# Patient Record
Sex: MALE | Race: WHITE | NOT HISPANIC OR LATINO | Employment: OTHER | URBAN - METROPOLITAN AREA
[De-identification: names, ages, dates, MRNs, and addresses within clinical notes are randomized per-mention and may not be internally consistent; named-entity substitution may affect disease eponyms.]

---

## 2017-10-24 ENCOUNTER — LAB REQUISITION (OUTPATIENT)
Dept: LAB | Facility: HOSPITAL | Age: 62
End: 2017-10-24

## 2017-10-24 DIAGNOSIS — B18.2 CHRONIC VIRAL HEPATITIS C (HCC): ICD-10-CM

## 2017-10-24 DIAGNOSIS — Z00.00 ROUTINE GENERAL MEDICAL EXAMINATION AT A HEALTH CARE FACILITY: ICD-10-CM

## 2017-10-24 LAB
ALBUMIN SERPL-MCNC: 4.8 G/DL (ref 3.2–4.8)
ALBUMIN/GLOB SERPL: 1.5 G/DL (ref 1.5–2.5)
ALP SERPL-CCNC: 89 U/L (ref 25–100)
ALT SERPL W P-5'-P-CCNC: 26 U/L (ref 7–40)
ANION GAP SERPL CALCULATED.3IONS-SCNC: 7 MMOL/L (ref 3–11)
AST SERPL-CCNC: 25 U/L (ref 0–33)
BASOPHILS # BLD AUTO: 0.03 10*3/MM3 (ref 0–0.2)
BASOPHILS NFR BLD AUTO: 0.4 % (ref 0–1)
BILIRUB SERPL-MCNC: 0.3 MG/DL (ref 0.3–1.2)
BUN BLD-MCNC: 31 MG/DL (ref 9–23)
BUN/CREAT SERPL: 11.9 (ref 7–25)
CALCIUM SPEC-SCNC: 9.9 MG/DL (ref 8.7–10.4)
CHLORIDE SERPL-SCNC: 111 MMOL/L (ref 99–109)
CO2 SERPL-SCNC: 21 MMOL/L (ref 20–31)
CREAT BLD-MCNC: 2.6 MG/DL (ref 0.6–1.3)
DEPRECATED RDW RBC AUTO: 44 FL (ref 37–54)
EOSINOPHIL # BLD AUTO: 0.11 10*3/MM3 (ref 0–0.3)
EOSINOPHIL NFR BLD AUTO: 1.5 % (ref 0–3)
ERYTHROCYTE [DISTWIDTH] IN BLOOD BY AUTOMATED COUNT: 13.5 % (ref 11.3–14.5)
GFR SERPL CREATININE-BSD FRML MDRD: 25 ML/MIN/1.73
GLOBULIN UR ELPH-MCNC: 3.2 GM/DL
GLUCOSE BLD-MCNC: 105 MG/DL (ref 70–100)
HCT VFR BLD AUTO: 48.3 % (ref 38.9–50.9)
HGB BLD-MCNC: 16.3 G/DL (ref 13.1–17.5)
IMM GRANULOCYTES # BLD: 0.01 10*3/MM3 (ref 0–0.03)
IMM GRANULOCYTES NFR BLD: 0.1 % (ref 0–0.6)
LYMPHOCYTES # BLD AUTO: 1.49 10*3/MM3 (ref 0.6–4.8)
LYMPHOCYTES NFR BLD AUTO: 20.6 % (ref 24–44)
MCH RBC QN AUTO: 30.6 PG (ref 27–31)
MCHC RBC AUTO-ENTMCNC: 33.7 G/DL (ref 32–36)
MCV RBC AUTO: 90.6 FL (ref 80–99)
MONOCYTES # BLD AUTO: 0.62 10*3/MM3 (ref 0–1)
MONOCYTES NFR BLD AUTO: 8.6 % (ref 0–12)
NEUTROPHILS # BLD AUTO: 4.97 10*3/MM3 (ref 1.5–8.3)
NEUTROPHILS NFR BLD AUTO: 68.8 % (ref 41–71)
PLATELET # BLD AUTO: 106 10*3/MM3 (ref 150–450)
PMV BLD AUTO: 10.5 FL (ref 6–12)
POTASSIUM BLD-SCNC: 5.4 MMOL/L (ref 3.5–5.5)
PROT SERPL-MCNC: 8 G/DL (ref 5.7–8.2)
RBC # BLD AUTO: 5.33 10*6/MM3 (ref 4.2–5.76)
SODIUM BLD-SCNC: 139 MMOL/L (ref 132–146)
WBC NRBC COR # BLD: 7.23 10*3/MM3 (ref 3.5–10.8)

## 2017-10-24 PROCEDURE — 36415 COLL VENOUS BLD VENIPUNCTURE: CPT | Performed by: INTERNAL MEDICINE

## 2017-10-24 PROCEDURE — 85025 COMPLETE CBC W/AUTO DIFF WBC: CPT | Performed by: INTERNAL MEDICINE

## 2017-10-24 PROCEDURE — 80053 COMPREHEN METABOLIC PANEL: CPT | Performed by: INTERNAL MEDICINE

## 2018-04-24 ENCOUNTER — LAB REQUISITION (OUTPATIENT)
Dept: LAB | Facility: HOSPITAL | Age: 63
End: 2018-04-24

## 2018-04-24 DIAGNOSIS — Z00.00 ROUTINE GENERAL MEDICAL EXAMINATION AT A HEALTH CARE FACILITY: ICD-10-CM

## 2018-04-24 LAB
ALBUMIN SERPL-MCNC: 4.6 G/DL (ref 3.2–4.8)
ANION GAP SERPL CALCULATED.3IONS-SCNC: 5 MMOL/L (ref 3–11)
BACTERIA UR QL AUTO: ABNORMAL /HPF
BILIRUB UR QL STRIP: NEGATIVE
BUN BLD-MCNC: 30 MG/DL (ref 9–23)
BUN/CREAT SERPL: 11.5 (ref 7–25)
CALCIUM SPEC-SCNC: 9.5 MG/DL (ref 8.7–10.4)
CHLORIDE SERPL-SCNC: 107 MMOL/L (ref 99–109)
CLARITY UR: CLEAR
CO2 SERPL-SCNC: 26 MMOL/L (ref 20–31)
COLOR UR: YELLOW
CREAT BLD-MCNC: 2.6 MG/DL (ref 0.6–1.3)
GFR SERPL CREATININE-BSD FRML MDRD: 25 ML/MIN/1.73
GLUCOSE BLD-MCNC: 105 MG/DL (ref 70–100)
GLUCOSE UR STRIP-MCNC: NEGATIVE MG/DL
HGB UR QL STRIP.AUTO: ABNORMAL
HYALINE CASTS UR QL AUTO: ABNORMAL /LPF
KETONES UR QL STRIP: NEGATIVE
LEUKOCYTE ESTERASE UR QL STRIP.AUTO: NEGATIVE
NITRITE UR QL STRIP: NEGATIVE
PH UR STRIP.AUTO: <=5 [PH] (ref 5–8)
PHOSPHATE SERPL-MCNC: 2.7 MG/DL (ref 2.4–5.1)
POTASSIUM BLD-SCNC: 5.1 MMOL/L (ref 3.5–5.5)
PROT UR QL STRIP: ABNORMAL
RBC # UR: ABNORMAL /HPF
REF LAB TEST METHOD: ABNORMAL
SODIUM BLD-SCNC: 138 MMOL/L (ref 132–146)
SP GR UR STRIP: 1.02 (ref 1–1.03)
SQUAMOUS #/AREA URNS HPF: ABNORMAL /HPF
UROBILINOGEN UR QL STRIP: ABNORMAL
WBC UR QL AUTO: ABNORMAL /HPF

## 2018-04-24 PROCEDURE — 80069 RENAL FUNCTION PANEL: CPT | Performed by: INTERNAL MEDICINE

## 2018-04-24 PROCEDURE — 81001 URINALYSIS AUTO W/SCOPE: CPT | Performed by: INTERNAL MEDICINE

## 2018-04-24 PROCEDURE — 36415 COLL VENOUS BLD VENIPUNCTURE: CPT | Performed by: INTERNAL MEDICINE

## 2018-10-23 ENCOUNTER — LAB REQUISITION (OUTPATIENT)
Dept: LAB | Facility: HOSPITAL | Age: 63
End: 2018-10-23

## 2018-10-23 DIAGNOSIS — Z00.00 ROUTINE GENERAL MEDICAL EXAMINATION AT A HEALTH CARE FACILITY: ICD-10-CM

## 2018-10-23 DIAGNOSIS — B18.2 CHRONIC VIRAL HEPATITIS C (HCC): ICD-10-CM

## 2018-10-23 LAB
ALBUMIN SERPL-MCNC: 4.99 G/DL (ref 3.2–4.8)
ALBUMIN/GLOB SERPL: 2 G/DL (ref 1.5–2.5)
ALP SERPL-CCNC: 73 U/L (ref 25–100)
ALT SERPL W P-5'-P-CCNC: 27 U/L (ref 7–40)
ANION GAP SERPL CALCULATED.3IONS-SCNC: 7 MMOL/L (ref 3–11)
AST SERPL-CCNC: 24 U/L (ref 0–33)
BASOPHILS # BLD AUTO: 0.03 10*3/MM3 (ref 0–0.2)
BASOPHILS NFR BLD AUTO: 0.4 % (ref 0–1)
BILIRUB SERPL-MCNC: 0.5 MG/DL (ref 0.3–1.2)
BUN BLD-MCNC: 25 MG/DL (ref 9–23)
BUN/CREAT SERPL: 9.9 (ref 7–25)
CALCIUM SPEC-SCNC: 10.1 MG/DL (ref 8.7–10.4)
CHLORIDE SERPL-SCNC: 108 MMOL/L (ref 99–109)
CO2 SERPL-SCNC: 25 MMOL/L (ref 20–31)
CREAT BLD-MCNC: 2.53 MG/DL (ref 0.6–1.3)
DEPRECATED RDW RBC AUTO: 44.2 FL (ref 37–54)
EOSINOPHIL # BLD AUTO: 0.05 10*3/MM3 (ref 0–0.3)
EOSINOPHIL NFR BLD AUTO: 0.7 % (ref 0–3)
ERYTHROCYTE [DISTWIDTH] IN BLOOD BY AUTOMATED COUNT: 13.3 % (ref 11.3–14.5)
GFR SERPL CREATININE-BSD FRML MDRD: 26 ML/MIN/1.73
GLOBULIN UR ELPH-MCNC: 2.5 GM/DL
GLUCOSE BLD-MCNC: 103 MG/DL (ref 70–100)
HCT VFR BLD AUTO: 48.5 % (ref 38.9–50.9)
HGB BLD-MCNC: 16.2 G/DL (ref 13.1–17.5)
IMM GRANULOCYTES # BLD: 0.02 10*3/MM3 (ref 0–0.03)
IMM GRANULOCYTES NFR BLD: 0.3 % (ref 0–0.6)
INR PPP: 1.09 (ref 0.91–1.09)
LYMPHOCYTES # BLD AUTO: 1 10*3/MM3 (ref 0.6–4.8)
LYMPHOCYTES NFR BLD AUTO: 13.9 % (ref 24–44)
MCH RBC QN AUTO: 30.7 PG (ref 27–31)
MCHC RBC AUTO-ENTMCNC: 33.4 G/DL (ref 32–36)
MCV RBC AUTO: 92 FL (ref 80–99)
MONOCYTES # BLD AUTO: 0.4 10*3/MM3 (ref 0–1)
MONOCYTES NFR BLD AUTO: 5.6 % (ref 0–12)
NEUTROPHILS # BLD AUTO: 5.69 10*3/MM3 (ref 1.5–8.3)
NEUTROPHILS NFR BLD AUTO: 79.4 % (ref 41–71)
NRBC BLD MANUAL-RTO: 0 /100 WBC (ref 0–0)
PLATELET # BLD AUTO: 108 10*3/MM3 (ref 150–450)
PMV BLD AUTO: 10.4 FL (ref 6–12)
POTASSIUM BLD-SCNC: 5.3 MMOL/L (ref 3.5–5.5)
PROT SERPL-MCNC: 7.5 G/DL (ref 5.7–8.2)
PROTHROMBIN TIME: 11.4 SECONDS (ref 9.6–11.5)
RBC # BLD AUTO: 5.27 10*6/MM3 (ref 4.2–5.76)
SODIUM BLD-SCNC: 140 MMOL/L (ref 132–146)
WBC NRBC COR # BLD: 7.17 10*3/MM3 (ref 3.5–10.8)

## 2018-10-23 PROCEDURE — 80053 COMPREHEN METABOLIC PANEL: CPT | Performed by: INTERNAL MEDICINE

## 2018-10-23 PROCEDURE — 85610 PROTHROMBIN TIME: CPT | Performed by: INTERNAL MEDICINE

## 2018-10-23 PROCEDURE — 36415 COLL VENOUS BLD VENIPUNCTURE: CPT | Performed by: INTERNAL MEDICINE

## 2018-10-23 PROCEDURE — 85025 COMPLETE CBC W/AUTO DIFF WBC: CPT | Performed by: INTERNAL MEDICINE

## 2019-10-22 ENCOUNTER — LAB REQUISITION (OUTPATIENT)
Dept: LAB | Facility: HOSPITAL | Age: 64
End: 2019-10-22

## 2019-10-22 DIAGNOSIS — Z00.00 ROUTINE GENERAL MEDICAL EXAMINATION AT A HEALTH CARE FACILITY: ICD-10-CM

## 2019-10-22 LAB
ALBUMIN SERPL-MCNC: 4.9 G/DL (ref 3.5–5.2)
ALBUMIN/GLOB SERPL: 1.6 G/DL
ALP SERPL-CCNC: 78 U/L (ref 39–117)
ALT SERPL W P-5'-P-CCNC: 16 U/L (ref 1–41)
ANION GAP SERPL CALCULATED.3IONS-SCNC: 9 MMOL/L (ref 5–15)
AST SERPL-CCNC: 17 U/L (ref 1–40)
BASOPHILS # BLD AUTO: 0.05 10*3/MM3 (ref 0–0.2)
BASOPHILS NFR BLD AUTO: 0.9 % (ref 0–1.5)
BILIRUB SERPL-MCNC: 0.6 MG/DL (ref 0.2–1.2)
BUN BLD-MCNC: 35 MG/DL (ref 8–23)
BUN/CREAT SERPL: 13.9 (ref 7–25)
CALCIUM SPEC-SCNC: 9.9 MG/DL (ref 8.6–10.5)
CHLORIDE SERPL-SCNC: 105 MMOL/L (ref 98–107)
CO2 SERPL-SCNC: 26 MMOL/L (ref 22–29)
CREAT BLD-MCNC: 2.51 MG/DL (ref 0.76–1.27)
DEPRECATED RDW RBC AUTO: 44.8 FL (ref 37–54)
EOSINOPHIL # BLD AUTO: 0.12 10*3/MM3 (ref 0–0.4)
EOSINOPHIL NFR BLD AUTO: 2.2 % (ref 0.3–6.2)
ERYTHROCYTE [DISTWIDTH] IN BLOOD BY AUTOMATED COUNT: 12.9 % (ref 12.3–15.4)
GFR SERPL CREATININE-BSD FRML MDRD: 26 ML/MIN/1.73
GLOBULIN UR ELPH-MCNC: 3.1 GM/DL
GLUCOSE BLD-MCNC: 102 MG/DL (ref 65–99)
HCT VFR BLD AUTO: 46.5 % (ref 37.5–51)
HGB BLD-MCNC: 14.9 G/DL (ref 13–17.7)
IMM GRANULOCYTES # BLD AUTO: 0.02 10*3/MM3 (ref 0–0.05)
IMM GRANULOCYTES NFR BLD AUTO: 0.4 % (ref 0–0.5)
LYMPHOCYTES # BLD AUTO: 1.22 10*3/MM3 (ref 0.7–3.1)
LYMPHOCYTES NFR BLD AUTO: 22.1 % (ref 19.6–45.3)
MCH RBC QN AUTO: 30.3 PG (ref 26.6–33)
MCHC RBC AUTO-ENTMCNC: 32 G/DL (ref 31.5–35.7)
MCV RBC AUTO: 94.5 FL (ref 79–97)
MONOCYTES # BLD AUTO: 0.54 10*3/MM3 (ref 0.1–0.9)
MONOCYTES NFR BLD AUTO: 9.8 % (ref 5–12)
NEUTROPHILS # BLD AUTO: 3.57 10*3/MM3 (ref 1.7–7)
NEUTROPHILS NFR BLD AUTO: 64.6 % (ref 42.7–76)
NRBC BLD AUTO-RTO: 0 /100 WBC (ref 0–0.2)
PLATELET # BLD AUTO: 108 10*3/MM3 (ref 140–450)
PMV BLD AUTO: 10.3 FL (ref 6–12)
POTASSIUM BLD-SCNC: 5.3 MMOL/L (ref 3.5–5.2)
PROT SERPL-MCNC: 8 G/DL (ref 6–8.5)
RBC # BLD AUTO: 4.92 10*6/MM3 (ref 4.14–5.8)
SODIUM BLD-SCNC: 140 MMOL/L (ref 136–145)
WBC NRBC COR # BLD: 5.52 10*3/MM3 (ref 3.4–10.8)

## 2019-10-22 PROCEDURE — 80053 COMPREHEN METABOLIC PANEL: CPT | Performed by: INTERNAL MEDICINE

## 2019-10-22 PROCEDURE — 85025 COMPLETE CBC W/AUTO DIFF WBC: CPT | Performed by: INTERNAL MEDICINE

## 2019-10-22 PROCEDURE — 36415 COLL VENOUS BLD VENIPUNCTURE: CPT | Performed by: INTERNAL MEDICINE

## 2022-11-11 ENCOUNTER — TRANSCRIBE ORDERS (OUTPATIENT)
Dept: ADMINISTRATIVE | Facility: HOSPITAL | Age: 67
End: 2022-11-11

## 2022-11-11 DIAGNOSIS — K74.02 HEPATIC FIBROSIS, ADVANCED FIBROSIS: Primary | ICD-10-CM

## 2022-11-14 ENCOUNTER — HOSPITAL ENCOUNTER (OUTPATIENT)
Dept: ULTRASOUND IMAGING | Facility: HOSPITAL | Age: 67
Discharge: HOME | End: 2022-11-14
Admitting: INTERNAL MEDICINE

## 2022-11-14 DIAGNOSIS — K74.02 HEPATIC FIBROSIS, ADVANCED FIBROSIS: ICD-10-CM

## 2022-11-14 PROCEDURE — 76700 US EXAM ABDOM COMPLETE: CPT

## 2023-08-15 ENCOUNTER — TRANSCRIBE ORDERS (OUTPATIENT)
Dept: ADMINISTRATIVE | Facility: HOSPITAL | Age: 68
End: 2023-08-15
Payer: MEDICARE

## 2023-08-15 DIAGNOSIS — K74.02 STAGE 3 HEPATIC FIBROSIS: Primary | ICD-10-CM

## 2023-09-06 ENCOUNTER — HOSPITAL ENCOUNTER (OUTPATIENT)
Dept: ULTRASOUND IMAGING | Facility: HOSPITAL | Age: 68
Discharge: HOME | End: 2023-09-06
Admitting: INTERNAL MEDICINE
Payer: MEDICARE

## 2023-09-06 DIAGNOSIS — K74.02 STAGE 3 HEPATIC FIBROSIS: ICD-10-CM

## 2023-09-06 PROCEDURE — 76705 ECHO EXAM OF ABDOMEN: CPT

## 2024-01-18 ENCOUNTER — HOSPITAL ENCOUNTER (OUTPATIENT)
Dept: GENERAL RADIOLOGY | Facility: HOSPITAL | Age: 69
Discharge: HOME | End: 2024-01-18
Payer: MEDICARE

## 2024-01-18 ENCOUNTER — PRE-ADMISSION TESTING (OUTPATIENT)
Dept: PREADMISSION TESTING | Facility: HOSPITAL | Age: 69
End: 2024-01-18
Payer: MEDICARE

## 2024-01-18 VITALS — WEIGHT: 201.83 LBS | BODY MASS INDEX: 28.26 KG/M2 | HEIGHT: 71 IN

## 2024-01-18 LAB
ALBUMIN SERPL-MCNC: 4.5 G/DL (ref 3.5–5.2)
ALBUMIN/GLOB SERPL: 1.5 G/DL
ALP SERPL-CCNC: 108 U/L (ref 39–117)
ALT SERPL W P-5'-P-CCNC: 16 U/L (ref 1–41)
ANION GAP SERPL CALCULATED.3IONS-SCNC: 8 MMOL/L (ref 5–15)
APTT PPP: 33.3 SECONDS (ref 22–39)
AST SERPL-CCNC: 15 U/L (ref 1–40)
BASOPHILS # BLD AUTO: 0.03 10*3/MM3 (ref 0–0.2)
BASOPHILS NFR BLD AUTO: 0.4 % (ref 0–1.5)
BILIRUB SERPL-MCNC: 0.3 MG/DL (ref 0–1.2)
BUN SERPL-MCNC: 44 MG/DL (ref 8–23)
BUN/CREAT SERPL: 15.1 (ref 7–25)
CALCIUM SPEC-SCNC: 9.6 MG/DL (ref 8.6–10.5)
CHLORIDE SERPL-SCNC: 107 MMOL/L (ref 98–107)
CO2 SERPL-SCNC: 25 MMOL/L (ref 22–29)
CREAT SERPL-MCNC: 2.92 MG/DL (ref 0.76–1.27)
DEPRECATED RDW RBC AUTO: 44.8 FL (ref 37–54)
EGFRCR SERPLBLD CKD-EPI 2021: 22.9 ML/MIN/1.73
EOSINOPHIL # BLD AUTO: 0.15 10*3/MM3 (ref 0–0.4)
EOSINOPHIL NFR BLD AUTO: 2.2 % (ref 0.3–6.2)
ERYTHROCYTE [DISTWIDTH] IN BLOOD BY AUTOMATED COUNT: 13.2 % (ref 12.3–15.4)
GLOBULIN UR ELPH-MCNC: 3 GM/DL
GLUCOSE SERPL-MCNC: 145 MG/DL (ref 65–99)
HBA1C MFR BLD: 5.1 % (ref 4.8–5.6)
HCT VFR BLD AUTO: 39.6 % (ref 37.5–51)
HGB BLD-MCNC: 13.1 G/DL (ref 13–17.7)
IMM GRANULOCYTES # BLD AUTO: 0.02 10*3/MM3 (ref 0–0.05)
IMM GRANULOCYTES NFR BLD AUTO: 0.3 % (ref 0–0.5)
INR PPP: 1.07 (ref 0.89–1.12)
LYMPHOCYTES # BLD AUTO: 1.26 10*3/MM3 (ref 0.7–3.1)
LYMPHOCYTES NFR BLD AUTO: 18.1 % (ref 19.6–45.3)
MCH RBC QN AUTO: 30.9 PG (ref 26.6–33)
MCHC RBC AUTO-ENTMCNC: 33.1 G/DL (ref 31.5–35.7)
MCV RBC AUTO: 93.4 FL (ref 79–97)
MONOCYTES # BLD AUTO: 0.46 10*3/MM3 (ref 0.1–0.9)
MONOCYTES NFR BLD AUTO: 6.6 % (ref 5–12)
NEUTROPHILS NFR BLD AUTO: 5.03 10*3/MM3 (ref 1.7–7)
NEUTROPHILS NFR BLD AUTO: 72.4 % (ref 42.7–76)
NRBC BLD AUTO-RTO: 0 /100 WBC (ref 0–0.2)
PLATELET # BLD AUTO: 147 10*3/MM3 (ref 140–450)
PMV BLD AUTO: 9.8 FL (ref 6–12)
POTASSIUM SERPL-SCNC: 5 MMOL/L (ref 3.5–5.2)
PROT SERPL-MCNC: 7.5 G/DL (ref 6–8.5)
PROTHROMBIN TIME: 14 SECONDS (ref 12.2–14.5)
RBC # BLD AUTO: 4.24 10*6/MM3 (ref 4.14–5.8)
SODIUM SERPL-SCNC: 140 MMOL/L (ref 136–145)
WBC NRBC COR # BLD AUTO: 6.95 10*3/MM3 (ref 3.4–10.8)

## 2024-01-18 PROCEDURE — 85610 PROTHROMBIN TIME: CPT

## 2024-01-18 PROCEDURE — 36415 COLL VENOUS BLD VENIPUNCTURE: CPT

## 2024-01-18 PROCEDURE — 83036 HEMOGLOBIN GLYCOSYLATED A1C: CPT

## 2024-01-18 PROCEDURE — 85025 COMPLETE CBC W/AUTO DIFF WBC: CPT

## 2024-01-18 PROCEDURE — 80053 COMPREHEN METABOLIC PANEL: CPT

## 2024-01-18 PROCEDURE — 93010 ELECTROCARDIOGRAM REPORT: CPT | Performed by: INTERNAL MEDICINE

## 2024-01-18 PROCEDURE — 85730 THROMBOPLASTIN TIME PARTIAL: CPT

## 2024-01-18 PROCEDURE — 93005 ELECTROCARDIOGRAM TRACING: CPT

## 2024-01-18 PROCEDURE — 71046 X-RAY EXAM CHEST 2 VIEWS: CPT

## 2024-01-18 RX ORDER — CARVEDILOL 3.12 MG/1
1 TABLET ORAL 2 TIMES DAILY
COMMUNITY

## 2024-01-18 RX ORDER — AMLODIPINE BESYLATE 10 MG/1
1 TABLET ORAL DAILY
COMMUNITY

## 2024-01-18 RX ORDER — PRAVASTATIN SODIUM 40 MG
1 TABLET ORAL DAILY
COMMUNITY

## 2024-01-18 RX ORDER — ALLOPURINOL 100 MG/1
1 TABLET ORAL DAILY
COMMUNITY

## 2024-01-18 NOTE — PAT
An arrival time for procedure was not provided during PAT visit. If patient had any questions or concerns about their arrival time, they were instructed to contact their surgeon/physician.  Additionally, if the patient referred to an arrival time that was acquired from their my chart account, patient was encouraged to verify that time with their surgeon/physician. Arrival times are NOT provided in Pre Admission Testing Department    PATIENT RECEIVED INSTRUCTIONS ON BENZOYL WASH REGARDING HOW TO AND WHEN TO USE. PATIENT VERBALIZED UNDERSTATING.     Patient to apply Chlorhexadine wipes  to surgical area (as instructed) the night before procedure and the AM of procedure. Wipes provided.    Patient directed to Radiology Department for CXR after Pre Admission Testing Appointment.     Post-Surgery Information Instruction Sheet given to patient during Pre-Admission Testing Visit with verbal instructions to patient to return with PAT PASS on the day of surgery. Additionally, encouraged patient to review the information provided.    InfuBLOCK (by [employer]) pain pump patient informational handout given to patient.  Instructed patient to watch InfuBLOCK Patient Education Video regarding Peripheral Nerve Catheter that will be in place for upcoming surgery unless contraindicated. The video can be accessed using QR code noted on handout.  Patient agreed to watch video.  Stressed to patient to call [facility] 24/7 if patient has any questions or concerns after discharge.     PATIENT EKG ON CHART AND IN EPIC FROM 01/18/2024

## 2024-01-21 LAB
QT INTERVAL: 364 MS
QT INTERVAL: 402 MS
QTC INTERVAL: 425 MS
QTC INTERVAL: 595 MS

## 2024-01-31 ENCOUNTER — ANESTHESIA EVENT (OUTPATIENT)
Dept: PERIOP | Facility: HOSPITAL | Age: 69
End: 2024-01-31
Payer: MEDICARE

## 2024-01-31 RX ORDER — SODIUM CHLORIDE 0.9 % (FLUSH) 0.9 %
10 SYRINGE (ML) INJECTION AS NEEDED
Status: CANCELLED | OUTPATIENT
Start: 2024-01-31

## 2024-01-31 RX ORDER — SODIUM CHLORIDE 0.9 % (FLUSH) 0.9 %
10 SYRINGE (ML) INJECTION EVERY 12 HOURS SCHEDULED
Status: CANCELLED | OUTPATIENT
Start: 2024-01-31

## 2024-01-31 RX ORDER — SODIUM CHLORIDE 9 MG/ML
40 INJECTION, SOLUTION INTRAVENOUS AS NEEDED
Status: CANCELLED | OUTPATIENT
Start: 2024-01-31

## 2024-01-31 RX ORDER — FAMOTIDINE 10 MG/ML
20 INJECTION, SOLUTION INTRAVENOUS ONCE
Status: CANCELLED | OUTPATIENT
Start: 2024-01-31 | End: 2024-01-31

## 2024-02-01 ENCOUNTER — ANESTHESIA (OUTPATIENT)
Dept: PERIOP | Facility: HOSPITAL | Age: 69
End: 2024-02-01
Payer: MEDICARE

## 2024-02-01 ENCOUNTER — ANESTHESIA EVENT CONVERTED (OUTPATIENT)
Dept: ANESTHESIOLOGY | Facility: HOSPITAL | Age: 69
End: 2024-02-01
Payer: MEDICARE

## 2024-02-01 ENCOUNTER — APPOINTMENT (OUTPATIENT)
Dept: GENERAL RADIOLOGY | Facility: HOSPITAL | Age: 69
End: 2024-02-01
Payer: MEDICARE

## 2024-02-01 ENCOUNTER — HOSPITAL ENCOUNTER (OUTPATIENT)
Facility: HOSPITAL | Age: 69
Discharge: HOME | End: 2024-02-02
Attending: ORTHOPAEDIC SURGERY | Admitting: ORTHOPAEDIC SURGERY
Payer: MEDICARE

## 2024-02-01 DIAGNOSIS — M19.011 GLENOHUMERAL ARTHRITIS, RIGHT: Primary | ICD-10-CM

## 2024-02-01 PROBLEM — E78.5 HYPERLIPIDEMIA: Status: ACTIVE | Noted: 2024-02-01

## 2024-02-01 PROBLEM — Z96.611 STATUS POST TOTAL SHOULDER ARTHROPLASTY, RIGHT: Status: ACTIVE | Noted: 2024-02-01

## 2024-02-01 PROBLEM — I10 HTN (HYPERTENSION): Status: ACTIVE | Noted: 2024-02-01

## 2024-02-01 LAB
BASE EXCESS BLDA CALC-SCNC: -6 MMOL/L (ref -5–5)
CA-I BLDA-SCNC: 1.31 MMOL/L (ref 1.2–1.32)
CO2 BLDA-SCNC: 21 MMOL/L (ref 24–29)
GLUCOSE BLDC GLUCOMTR-MCNC: 89 MG/DL (ref 70–130)
GLUCOSE BLDC GLUCOMTR-MCNC: 94 MG/DL (ref 70–130)
HCO3 BLDA-SCNC: 19.5 MMOL/L (ref 22–26)
HCT VFR BLDA CALC: 37 % (ref 38–51)
HGB BLDA-MCNC: 12.6 G/DL (ref 12–17)
PCO2 BLDA: 36.9 MM HG (ref 35–45)
PH BLDA: 7.33 PH UNITS (ref 7.35–7.6)
PO2 BLDA: 50 MMHG (ref 80–105)
POTASSIUM BLDA-SCNC: 4.8 MMOL/L (ref 3.5–4.9)
SAO2 % BLDA: 83 % (ref 95–98)
SODIUM BLD-SCNC: 142 MMOL/L (ref 138–146)

## 2024-02-01 PROCEDURE — L3670 SO ACRO/CLAV CAN WEB PRE OTS: HCPCS | Performed by: ORTHOPAEDIC SURGERY

## 2024-02-01 PROCEDURE — 25010000002 FENTANYL CITRATE (PF) 50 MCG/ML SOLUTION: Performed by: NURSE ANESTHETIST, CERTIFIED REGISTERED

## 2024-02-01 PROCEDURE — A9270 NON-COVERED ITEM OR SERVICE: HCPCS | Performed by: INTERNAL MEDICINE

## 2024-02-01 PROCEDURE — 25010000002 CEFAZOLIN PER 500 MG: Performed by: ORTHOPAEDIC SURGERY

## 2024-02-01 PROCEDURE — C1713 ANCHOR/SCREW BN/BN,TIS/BN: HCPCS | Performed by: ORTHOPAEDIC SURGERY

## 2024-02-01 PROCEDURE — 63710000001 CARVEDILOL 3.125 MG TABLET: Performed by: INTERNAL MEDICINE

## 2024-02-01 PROCEDURE — 97535 SELF CARE MNGMENT TRAINING: CPT | Performed by: OCCUPATIONAL THERAPIST

## 2024-02-01 PROCEDURE — 82330 ASSAY OF CALCIUM: CPT

## 2024-02-01 PROCEDURE — 85014 HEMATOCRIT: CPT

## 2024-02-01 PROCEDURE — 97530 THERAPEUTIC ACTIVITIES: CPT | Performed by: OCCUPATIONAL THERAPIST

## 2024-02-01 PROCEDURE — G0378 HOSPITAL OBSERVATION PER HR: HCPCS

## 2024-02-01 PROCEDURE — 25010000002 ONDANSETRON PER 1 MG

## 2024-02-01 PROCEDURE — 97165 OT EVAL LOW COMPLEX 30 MIN: CPT | Performed by: OCCUPATIONAL THERAPIST

## 2024-02-01 PROCEDURE — 25010000002 VANCOMYCIN 1 G RECONSTITUTED SOLUTION: Performed by: ORTHOPAEDIC SURGERY

## 2024-02-01 PROCEDURE — 84132 ASSAY OF SERUM POTASSIUM: CPT

## 2024-02-01 PROCEDURE — C1776 JOINT DEVICE (IMPLANTABLE): HCPCS | Performed by: ORTHOPAEDIC SURGERY

## 2024-02-01 PROCEDURE — 82947 ASSAY GLUCOSE BLOOD QUANT: CPT

## 2024-02-01 PROCEDURE — P9612 CATHETERIZE FOR URINE SPEC: HCPCS

## 2024-02-01 PROCEDURE — 25010000002 ROPIVACAINE HCL-NACL 0.2-0.9 % SOLUTION: Performed by: NURSE ANESTHETIST, CERTIFIED REGISTERED

## 2024-02-01 PROCEDURE — 25010000002 BUPIVACAINE (PF) 0.25 % SOLUTION: Performed by: NURSE ANESTHETIST, CERTIFIED REGISTERED

## 2024-02-01 PROCEDURE — 82803 BLOOD GASES ANY COMBINATION: CPT

## 2024-02-01 PROCEDURE — 25810000003 SODIUM CHLORIDE 0.9 % SOLUTION: Performed by: ANESTHESIOLOGY

## 2024-02-01 PROCEDURE — 25810000003 SODIUM CHLORIDE 0.9 % SOLUTION: Performed by: ORTHOPAEDIC SURGERY

## 2024-02-01 PROCEDURE — 84295 ASSAY OF SERUM SODIUM: CPT

## 2024-02-01 PROCEDURE — 25010000002 VANCOMYCIN 1 G RECONSTITUTED SOLUTION

## 2024-02-01 PROCEDURE — 25010000002 SUGAMMADEX 200 MG/2ML SOLUTION

## 2024-02-01 PROCEDURE — 25010000002 DEXAMETHASONE PER 1 MG

## 2024-02-01 PROCEDURE — 25010000002 PROPOFOL 10 MG/ML EMULSION

## 2024-02-01 PROCEDURE — 73030 X-RAY EXAM OF SHOULDER: CPT

## 2024-02-01 PROCEDURE — 97110 THERAPEUTIC EXERCISES: CPT | Performed by: OCCUPATIONAL THERAPIST

## 2024-02-01 PROCEDURE — 25010000002 FENTANYL CITRATE (PF) 50 MCG/ML SOLUTION

## 2024-02-01 PROCEDURE — 25010000002 HYDROMORPHONE PER 4 MG

## 2024-02-01 PROCEDURE — 25010000002 PHENYLEPHRINE 10 MG/ML SOLUTION 1 ML VIAL

## 2024-02-01 PROCEDURE — 25010000002 VANCOMYCIN 10 G RECONSTITUTED SOLUTION: Performed by: ORTHOPAEDIC SURGERY

## 2024-02-01 DEVICE — CP SHLDR TOTL NOSTEM ANAT TSA W/AUG: Type: IMPLANTABLE DEVICE | Site: SHOULDER | Status: FUNCTIONAL

## 2024-02-01 DEVICE — SUT/ANCH SHLDR/ELBW SUTURETAPE TPR/NDL 1.3MM BLK: Type: IMPLANTABLE DEVICE | Site: SHOULDER | Status: FUNCTIONAL

## 2024-02-01 DEVICE — ABSORBABLE HEMOSTAT (OXIDIZED REGENERATED CELLULOSE)
Type: IMPLANTABLE DEVICE | Site: SHOULDER | Status: FUNCTIONAL
Brand: SURGICEL

## 2024-02-01 DEVICE — IMPLANTABLE DEVICE: Type: IMPLANTABLE DEVICE | Site: SHOULDER | Status: FUNCTIONAL

## 2024-02-01 DEVICE — SUT FIBERLINK W/SUT TP 1.3MM WHT/BLU: Type: IMPLANTABLE DEVICE | Site: SHOULDER | Status: FUNCTIONAL

## 2024-02-01 DEVICE — CMT BONE SIMPLEX/P FULL DOSE 10/PK: Type: IMPLANTABLE DEVICE | Site: SHOULDER | Status: FUNCTIONAL

## 2024-02-01 RX ORDER — ACETAMINOPHEN 650 MG/1
650 SUPPOSITORY RECTAL EVERY 4 HOURS PRN
Status: DISCONTINUED | OUTPATIENT
Start: 2024-02-01 | End: 2024-02-02 | Stop reason: HOSPADM

## 2024-02-01 RX ORDER — TRANEXAMIC ACID 10 MG/ML
1000 INJECTION, SOLUTION INTRAVENOUS ONCE
Status: DISCONTINUED | OUTPATIENT
Start: 2024-02-01 | End: 2024-02-01 | Stop reason: HOSPADM

## 2024-02-01 RX ORDER — SODIUM CHLORIDE, SODIUM LACTATE, POTASSIUM CHLORIDE, CALCIUM CHLORIDE 600; 310; 30; 20 MG/100ML; MG/100ML; MG/100ML; MG/100ML
9 INJECTION, SOLUTION INTRAVENOUS CONTINUOUS
Status: DISCONTINUED | OUTPATIENT
Start: 2024-02-01 | End: 2024-02-01

## 2024-02-01 RX ORDER — HYDROCODONE BITARTRATE AND ACETAMINOPHEN 5; 325 MG/1; MG/1
TABLET ORAL
Status: DISPENSED
Start: 2024-02-01 | End: 2024-02-02

## 2024-02-01 RX ORDER — VANCOMYCIN HYDROCHLORIDE 1 G/20ML
INJECTION, POWDER, LYOPHILIZED, FOR SOLUTION INTRAVENOUS AS NEEDED
Status: DISCONTINUED | OUTPATIENT
Start: 2024-02-01 | End: 2024-02-01 | Stop reason: SURG

## 2024-02-01 RX ORDER — OXYCODONE HYDROCHLORIDE 5 MG/1
5 TABLET ORAL EVERY 4 HOURS PRN
Qty: 40 TABLET | Refills: 0 | Status: SHIPPED | OUTPATIENT
Start: 2024-02-01

## 2024-02-01 RX ORDER — PROPOFOL 10 MG/ML
VIAL (ML) INTRAVENOUS AS NEEDED
Status: DISCONTINUED | OUTPATIENT
Start: 2024-02-01 | End: 2024-02-01 | Stop reason: SURG

## 2024-02-01 RX ORDER — ONDANSETRON 2 MG/ML
4 INJECTION INTRAMUSCULAR; INTRAVENOUS ONCE AS NEEDED
Status: COMPLETED | OUTPATIENT
Start: 2024-02-01 | End: 2024-02-01

## 2024-02-01 RX ORDER — ONDANSETRON 4 MG/1
4 TABLET, FILM COATED ORAL EVERY 8 HOURS PRN
Qty: 30 TABLET | Refills: 0 | Status: SHIPPED | OUTPATIENT
Start: 2024-02-01

## 2024-02-01 RX ORDER — LIDOCAINE HYDROCHLORIDE 10 MG/ML
0.5 INJECTION, SOLUTION EPIDURAL; INFILTRATION; INTRACAUDAL; PERINEURAL ONCE AS NEEDED
Status: DISCONTINUED | OUTPATIENT
Start: 2024-02-01 | End: 2024-02-01 | Stop reason: HOSPADM

## 2024-02-01 RX ORDER — ACETAMINOPHEN 325 MG/1
650 TABLET ORAL EVERY 4 HOURS PRN
Status: DISCONTINUED | OUTPATIENT
Start: 2024-02-01 | End: 2024-02-02 | Stop reason: HOSPADM

## 2024-02-01 RX ORDER — NALOXONE HCL 0.4 MG/ML
0.4 VIAL (ML) INJECTION AS NEEDED
Status: DISCONTINUED | OUTPATIENT
Start: 2024-02-01 | End: 2024-02-01

## 2024-02-01 RX ORDER — SODIUM CHLORIDE 450 MG/100ML
50 INJECTION, SOLUTION INTRAVENOUS CONTINUOUS
Status: DISCONTINUED | OUTPATIENT
Start: 2024-02-01 | End: 2024-02-02

## 2024-02-01 RX ORDER — MELOXICAM 15 MG/1
15 TABLET ORAL ONCE
Status: COMPLETED | OUTPATIENT
Start: 2024-02-01 | End: 2024-02-01

## 2024-02-01 RX ORDER — HYDROMORPHONE HYDROCHLORIDE 1 MG/ML
0.5 INJECTION, SOLUTION INTRAMUSCULAR; INTRAVENOUS; SUBCUTANEOUS
Status: DISCONTINUED | OUTPATIENT
Start: 2024-02-01 | End: 2024-02-01

## 2024-02-01 RX ORDER — FENTANYL CITRATE 50 UG/ML
INJECTION, SOLUTION INTRAMUSCULAR; INTRAVENOUS
Status: DISPENSED
Start: 2024-02-01 | End: 2024-02-02

## 2024-02-01 RX ORDER — ALLOPURINOL 100 MG/1
100 TABLET ORAL DAILY
Status: DISCONTINUED | OUTPATIENT
Start: 2024-02-02 | End: 2024-02-02 | Stop reason: HOSPADM

## 2024-02-01 RX ORDER — SODIUM CHLORIDE 9 MG/ML
9 INJECTION, SOLUTION INTRAVENOUS CONTINUOUS
Status: DISCONTINUED | OUTPATIENT
Start: 2024-02-01 | End: 2024-02-02

## 2024-02-01 RX ORDER — HYDROCODONE BITARTRATE AND ACETAMINOPHEN 5; 325 MG/1; MG/1
1 TABLET ORAL ONCE AS NEEDED
Status: COMPLETED | OUTPATIENT
Start: 2024-02-01 | End: 2024-02-01

## 2024-02-01 RX ORDER — FENTANYL CITRATE 50 UG/ML
50 INJECTION, SOLUTION INTRAMUSCULAR; INTRAVENOUS
Status: DISCONTINUED | OUTPATIENT
Start: 2024-02-01 | End: 2024-02-01

## 2024-02-01 RX ORDER — FENTANYL CITRATE 50 UG/ML
INJECTION, SOLUTION INTRAMUSCULAR; INTRAVENOUS
Status: COMPLETED | OUTPATIENT
Start: 2024-02-01 | End: 2024-02-01

## 2024-02-01 RX ORDER — MAGNESIUM HYDROXIDE 1200 MG/15ML
LIQUID ORAL AS NEEDED
Status: DISCONTINUED | OUTPATIENT
Start: 2024-02-01 | End: 2024-02-01 | Stop reason: HOSPADM

## 2024-02-01 RX ORDER — LIDOCAINE HYDROCHLORIDE 10 MG/ML
INJECTION, SOLUTION EPIDURAL; INFILTRATION; INTRACAUDAL; PERINEURAL AS NEEDED
Status: DISCONTINUED | OUTPATIENT
Start: 2024-02-01 | End: 2024-02-01 | Stop reason: SURG

## 2024-02-01 RX ORDER — DEXAMETHASONE SODIUM PHOSPHATE 4 MG/ML
INJECTION, SOLUTION INTRA-ARTICULAR; INTRALESIONAL; INTRAMUSCULAR; INTRAVENOUS; SOFT TISSUE AS NEEDED
Status: DISCONTINUED | OUTPATIENT
Start: 2024-02-01 | End: 2024-02-01 | Stop reason: SURG

## 2024-02-01 RX ORDER — NALOXONE HCL 0.4 MG/ML
0.1 VIAL (ML) INJECTION
Status: DISCONTINUED | OUTPATIENT
Start: 2024-02-01 | End: 2024-02-02 | Stop reason: HOSPADM

## 2024-02-01 RX ORDER — OXYCODONE HYDROCHLORIDE 5 MG/1
5 TABLET ORAL EVERY 4 HOURS PRN
Status: DISCONTINUED | OUTPATIENT
Start: 2024-02-01 | End: 2024-02-02 | Stop reason: HOSPADM

## 2024-02-01 RX ORDER — VANCOMYCIN HYDROCHLORIDE 1 G/20ML
INJECTION, POWDER, LYOPHILIZED, FOR SOLUTION INTRAVENOUS AS NEEDED
Status: DISCONTINUED | OUTPATIENT
Start: 2024-02-01 | End: 2024-02-01 | Stop reason: HOSPADM

## 2024-02-01 RX ORDER — SODIUM CHLORIDE 0.9 % (FLUSH) 0.9 %
3 SYRINGE (ML) INJECTION EVERY 12 HOURS SCHEDULED
Status: DISCONTINUED | OUTPATIENT
Start: 2024-02-01 | End: 2024-02-01

## 2024-02-01 RX ORDER — PHENYLEPHRINE HCL IN 0.9% NACL 1 MG/10 ML
SYRINGE (ML) INTRAVENOUS AS NEEDED
Status: DISCONTINUED | OUTPATIENT
Start: 2024-02-01 | End: 2024-02-01 | Stop reason: SURG

## 2024-02-01 RX ORDER — HYDROMORPHONE HYDROCHLORIDE 2 MG/ML
0.5 INJECTION, SOLUTION INTRAMUSCULAR; INTRAVENOUS; SUBCUTANEOUS
Status: DISCONTINUED | OUTPATIENT
Start: 2024-02-01 | End: 2024-02-02 | Stop reason: HOSPADM

## 2024-02-01 RX ORDER — PRAVASTATIN SODIUM 40 MG
40 TABLET ORAL DAILY
Status: DISCONTINUED | OUTPATIENT
Start: 2024-02-02 | End: 2024-02-02 | Stop reason: HOSPADM

## 2024-02-01 RX ORDER — TRANEXAMIC ACID 10 MG/ML
1000 INJECTION, SOLUTION INTRAVENOUS ONCE
Status: COMPLETED | OUTPATIENT
Start: 2024-02-01 | End: 2024-02-01

## 2024-02-01 RX ORDER — SODIUM CHLORIDE 0.9 % (FLUSH) 0.9 %
3-10 SYRINGE (ML) INJECTION AS NEEDED
Status: DISCONTINUED | OUTPATIENT
Start: 2024-02-01 | End: 2024-02-01

## 2024-02-01 RX ORDER — FENTANYL CITRATE 50 UG/ML
INJECTION, SOLUTION INTRAMUSCULAR; INTRAVENOUS AS NEEDED
Status: DISCONTINUED | OUTPATIENT
Start: 2024-02-01 | End: 2024-02-01 | Stop reason: SURG

## 2024-02-01 RX ORDER — ROPIVACAINE HYDROCHLORIDE 2 MG/ML
INJECTION, SOLUTION EPIDURAL; INFILTRATION; PERINEURAL CONTINUOUS
Status: DISCONTINUED | OUTPATIENT
Start: 2024-02-01 | End: 2024-02-02 | Stop reason: HOSPADM

## 2024-02-01 RX ORDER — BUPIVACAINE HYDROCHLORIDE 2.5 MG/ML
INJECTION, SOLUTION EPIDURAL; INFILTRATION; INTRACAUDAL
Status: COMPLETED | OUTPATIENT
Start: 2024-02-01 | End: 2024-02-01

## 2024-02-01 RX ORDER — DROPERIDOL 2.5 MG/ML
0.62 INJECTION, SOLUTION INTRAMUSCULAR; INTRAVENOUS
Status: DISCONTINUED | OUTPATIENT
Start: 2024-02-01 | End: 2024-02-01

## 2024-02-01 RX ORDER — ROCURONIUM BROMIDE 10 MG/ML
INJECTION, SOLUTION INTRAVENOUS AS NEEDED
Status: DISCONTINUED | OUTPATIENT
Start: 2024-02-01 | End: 2024-02-01 | Stop reason: SURG

## 2024-02-01 RX ORDER — DROPERIDOL 2.5 MG/ML
0.62 INJECTION, SOLUTION INTRAMUSCULAR; INTRAVENOUS ONCE AS NEEDED
Status: DISCONTINUED | OUTPATIENT
Start: 2024-02-01 | End: 2024-02-01

## 2024-02-01 RX ORDER — SODIUM CHLORIDE 9 MG/ML
40 INJECTION, SOLUTION INTRAVENOUS AS NEEDED
Status: DISCONTINUED | OUTPATIENT
Start: 2024-02-01 | End: 2024-02-01

## 2024-02-01 RX ORDER — ONDANSETRON 2 MG/ML
INJECTION INTRAMUSCULAR; INTRAVENOUS AS NEEDED
Status: DISCONTINUED | OUTPATIENT
Start: 2024-02-01 | End: 2024-02-01 | Stop reason: SURG

## 2024-02-01 RX ORDER — PREGABALIN 75 MG/1
75 CAPSULE ORAL ONCE
Status: COMPLETED | OUTPATIENT
Start: 2024-02-01 | End: 2024-02-01

## 2024-02-01 RX ORDER — FAMOTIDINE 20 MG/1
20 TABLET, FILM COATED ORAL ONCE
Status: COMPLETED | OUTPATIENT
Start: 2024-02-01 | End: 2024-02-01

## 2024-02-01 RX ORDER — CARVEDILOL 3.12 MG/1
3.12 TABLET ORAL 2 TIMES DAILY
Status: DISCONTINUED | OUTPATIENT
Start: 2024-02-01 | End: 2024-02-02 | Stop reason: HOSPADM

## 2024-02-01 RX ORDER — ONDANSETRON 2 MG/ML
INJECTION INTRAMUSCULAR; INTRAVENOUS
Status: DISPENSED
Start: 2024-02-01 | End: 2024-02-02

## 2024-02-01 RX ORDER — ONDANSETRON 2 MG/ML
4 INJECTION INTRAMUSCULAR; INTRAVENOUS EVERY 6 HOURS PRN
Status: DISCONTINUED | OUTPATIENT
Start: 2024-02-01 | End: 2024-02-02 | Stop reason: HOSPADM

## 2024-02-01 RX ORDER — IPRATROPIUM BROMIDE AND ALBUTEROL SULFATE 2.5; .5 MG/3ML; MG/3ML
3 SOLUTION RESPIRATORY (INHALATION) ONCE AS NEEDED
Status: DISCONTINUED | OUTPATIENT
Start: 2024-02-01 | End: 2024-02-01

## 2024-02-01 RX ORDER — LABETALOL HYDROCHLORIDE 5 MG/ML
10 INJECTION, SOLUTION INTRAVENOUS EVERY 4 HOURS PRN
Status: DISCONTINUED | OUTPATIENT
Start: 2024-02-01 | End: 2024-02-02 | Stop reason: HOSPADM

## 2024-02-01 RX ORDER — DROPERIDOL 2.5 MG/ML
INJECTION, SOLUTION INTRAMUSCULAR; INTRAVENOUS
Status: DISPENSED
Start: 2024-02-01 | End: 2024-02-02

## 2024-02-01 RX ORDER — ONDANSETRON 4 MG/1
4 TABLET, ORALLY DISINTEGRATING ORAL EVERY 6 HOURS PRN
Status: DISCONTINUED | OUTPATIENT
Start: 2024-02-01 | End: 2024-02-02 | Stop reason: HOSPADM

## 2024-02-01 RX ORDER — FENTANYL CITRATE 50 UG/ML
INJECTION, SOLUTION INTRAMUSCULAR; INTRAVENOUS
Status: COMPLETED
Start: 2024-02-01 | End: 2024-02-01

## 2024-02-01 RX ADMIN — ROCURONIUM BROMIDE 20 MG: 10 SOLUTION INTRAVENOUS at 10:41

## 2024-02-01 RX ADMIN — FENTANYL CITRATE 50 MCG: 50 INJECTION, SOLUTION INTRAMUSCULAR; INTRAVENOUS at 15:33

## 2024-02-01 RX ADMIN — SODIUM CHLORIDE 2000 MG: 900 INJECTION INTRAVENOUS at 18:43

## 2024-02-01 RX ADMIN — FENTANYL CITRATE 100 MCG: 50 INJECTION, SOLUTION INTRAMUSCULAR; INTRAVENOUS at 08:22

## 2024-02-01 RX ADMIN — CARVEDILOL 3.12 MG: 3.12 TABLET, FILM COATED ORAL at 20:15

## 2024-02-01 RX ADMIN — ONDANSETRON 4 MG: 2 INJECTION INTRAMUSCULAR; INTRAVENOUS at 14:19

## 2024-02-01 RX ADMIN — SUGAMMADEX 200 MG: 100 INJECTION, SOLUTION INTRAVENOUS at 11:43

## 2024-02-01 RX ADMIN — HYDROMORPHONE HYDROCHLORIDE 0.5 MG: 1 INJECTION, SOLUTION INTRAMUSCULAR; INTRAVENOUS; SUBCUTANEOUS at 12:10

## 2024-02-01 RX ADMIN — SODIUM CHLORIDE 2000 MG: 900 INJECTION INTRAVENOUS at 10:01

## 2024-02-01 RX ADMIN — VANCOMYCIN HYDROCHLORIDE 1250 MG: 10 INJECTION, POWDER, LYOPHILIZED, FOR SOLUTION INTRAVENOUS at 08:25

## 2024-02-01 RX ADMIN — PHENYLEPHRINE HYDROCHLORIDE 0.5 MCG/KG/MIN: 10 INJECTION INTRAVENOUS at 10:27

## 2024-02-01 RX ADMIN — FAMOTIDINE 20 MG: 20 TABLET ORAL at 08:20

## 2024-02-01 RX ADMIN — LIDOCAINE HYDROCHLORIDE 100 MG: 10 INJECTION, SOLUTION EPIDURAL; INFILTRATION; INTRACAUDAL; PERINEURAL at 09:56

## 2024-02-01 RX ADMIN — SODIUM CHLORIDE 50 ML/HR: 4.5 INJECTION, SOLUTION INTRAVENOUS at 16:48

## 2024-02-01 RX ADMIN — HYDROCODONE BITARTRATE AND ACETAMINOPHEN 1 TABLET: 5; 325 TABLET ORAL at 14:01

## 2024-02-01 RX ADMIN — ROCURONIUM BROMIDE 80 MG: 10 SOLUTION INTRAVENOUS at 09:57

## 2024-02-01 RX ADMIN — Medication 1000 MG: at 12:13

## 2024-02-01 RX ADMIN — TRANEXAMIC ACID 1000 MG: 10 INJECTION, SOLUTION INTRAVENOUS at 11:10

## 2024-02-01 RX ADMIN — BUPIVACAINE HYDROCHLORIDE 15 ML: 2.5 INJECTION, SOLUTION EPIDURAL; INFILTRATION; INTRACAUDAL; PERINEURAL at 08:42

## 2024-02-01 RX ADMIN — FENTANYL CITRATE 100 MCG: 50 INJECTION, SOLUTION INTRAMUSCULAR; INTRAVENOUS at 09:56

## 2024-02-01 RX ADMIN — MELOXICAM 15 MG: 15 TABLET ORAL at 08:20

## 2024-02-01 RX ADMIN — DEXAMETHASONE SODIUM PHOSPHATE 4 MG: 4 INJECTION, SOLUTION INTRAMUSCULAR; INTRAVENOUS at 10:02

## 2024-02-01 RX ADMIN — SODIUM CHLORIDE 9 ML/HR: 9 INJECTION, SOLUTION INTRAVENOUS at 08:00

## 2024-02-01 RX ADMIN — Medication 100 MCG: at 10:48

## 2024-02-01 RX ADMIN — PREGABALIN 75 MG: 75 CAPSULE ORAL at 08:20

## 2024-02-01 RX ADMIN — TRANEXAMIC ACID 1000 MG: 10 INJECTION, SOLUTION INTRAVENOUS at 10:04

## 2024-02-01 RX ADMIN — VANCOMYCIN HYDROCHLORIDE 1250 MG: 1 INJECTION, POWDER, LYOPHILIZED, FOR SOLUTION INTRAVENOUS at 09:52

## 2024-02-01 RX ADMIN — ONDANSETRON 4 MG: 2 INJECTION INTRAMUSCULAR; INTRAVENOUS at 11:12

## 2024-02-01 RX ADMIN — PROPOFOL 175 MG: 10 INJECTION, EMULSION INTRAVENOUS at 09:56

## 2024-02-01 NOTE — ANESTHESIA PROCEDURE NOTES
Airway  Urgency: elective    Date/Time: 2/1/2024 9:58 AM  Airway not difficult    General Information and Staff    Patient location during procedure: OR  CRNA/CAA: Ralf Becerra CRNA    Indications and Patient Condition  Indications for airway management: airway protection    Preoxygenated: yes  MILS not maintained throughout  Mask difficulty assessment: 2 - vent by mask + OA or adjuvant +/- NMBA    Final Airway Details  Final airway type: endotracheal airway      Successful airway: ETT  Cuffed: yes   Successful intubation technique: direct laryngoscopy  Facilitating devices/methods: intubating stylet and cricoid pressure  Endotracheal tube insertion site: oral  Blade: Zana  Blade size: 4  ETT size (mm): 7.5  Cormack-Lehane Classification: grade I - full view of glottis  Placement verified by: chest auscultation and capnometry   Cuff volume (mL): 10  Measured from: lips  ETT/EBT  to lips (cm): 21  Number of attempts at approach: 1  Assessment: lips, teeth, and gum same as pre-op and atraumatic intubation    Additional Comments  Negative epigastric sounds, Breath sound equal bilaterally with symmetric chest rise and fall

## 2024-02-01 NOTE — OP NOTE
erative Report Total Shoulder Replacement     Preoperative Diagnosis:Right shoulder degenerative arthritis, Left shoulder degenerative arthritis     Postoperative Diagnosis: Same     Procedure: Right Total shoulder arthroplasty, Left shoulder injection of corticosteroid     Surgeon: Dr. Jhon Fang        Assistant:Yazmin Reid PA-C  ** Please note the physician assistant was medically necessary to assist with positioning retraction, arm positioning, care of soft tissues and closure     EBL:    100 cc     Anesthesia: GETA with interscalene brachial plexus block     Implants:  Arthrex Total Shoulder Arthroplasty System  1)         Humeral Trunion: 45 with medium Cage screw  2)         Glenoid:  Size medium, 15 deg posterior augment , pegged, cemented  3)         Head:  45 std           Indications: Patient   is a 67yo female with right shoulder degenerative arthritis and left shoulder degenerative arthritis.  Risks and benefits of operative versus nonoperative management discussed.  Patient elected to proceed with surgery. Informed consent obtained.         Description: Patient was met in the preoperative holding area and confirmed by name, medical record number, .  The operative site was correctly identified. Patient was then met by anesthesia and cleared for surgery.  An interscalene brachial plexus block was then performed.  Patient was then brought to the operating room suite and and placed supine on the OR table.  Patient underwent smooth induction with GETA.  Patient then positioned in the beach chair position. Patient’s right shoulder and right upper extremity prepped and draped in sterile fashion. Preoperative prophylactic antibiotic given with 2 g of Ancef.  A timeout was then observed     An approx 8cm skin incision was made centered over the deltopectoral interval.  Dissection carried down to the interval taking the cephalic vein laterally.  Deep retractors were then placed. The biceps tendon was  then localized and followed through the rotator interval back to the glenoid and tenotomized.  A lesser tuberosity osteotomy was performed and the shoulder was readily dislocated.       The proximal humerus was cut in a free hand fashion in approximately 30 degrees of retroversion and the appropriate depth of resection and inclination. We then reamed the humerus distally to accommodate a humeral stem trial. We then used the countersink reamed and then placed an appropriate humeratrial.  A humeral head trial was placed and rotated to the appropriate position.         The axillary nerve was then palpated and deep retractors were placed exposing the glenoid. Careful releases of the anterior and inferior capsule were then performed further exposing the glenoid.  The  glenoid was then exposed, the glenoid was reamed appropriately and the  peg holes drilled.  Cement was then mixed, and placed in a pressurized fashion into the peg holes.  An all poly glenoid implant was applied and impacted into position.             The shoulder was then redislocated and a size 45 trunion and medium cage screw was placed was placed.      We then drilled bone tunnels through eyelets in the trunion and passed 4  x 1.7mm suture tapes in a racking hitch orientation.        A 45 std humeral head was applied and impacted into position.       We then passed our stitches through the subscapularis and tied down our tendon in a racking hitch fashion.       We then irrigated with sterile saline. We then closed the deltopectoral layer with 0-vicryl, the dermal layer with 2-0 vicryl and the skin with interrupted 3-0 nylon.  Vancomycin powder was placed in the wound.  A sterile dressing was applied.      We then prepped the anterior aspect of the left shoulder with alcohol, we observed a timeout, we then inserted a 25G needle into the intra-articular space and injected 2cc of 1% lidocaine, 2cc of 0.5% marcaine and 40 mg of Kenalog     Patient tolerated  the procedure well.  They were extubated and placed in a sling.  At the end of the case all sponge and instrument counts were correct x 2.       Plan: Patient will be admitted for observation and pain control.  Antibiotics x 24 hours.   Patient will be seen back in the clinic in approx 10-14 days

## 2024-02-01 NOTE — PLAN OF CARE
Goal Outcome Evaluation:  Plan of Care Reviewed With: patient           Outcome Evaluation: Evaluation limited with agitation, restlessness and medical status. He completed bed mobility with min assist supine-to-sit, ambulated 200 feet with mod assist x2 and max assist/dependence UB ALDS and sling management. He did not pass mobility screen and reported feeling weak, recommend PT eval. During teaching, pt sitting EOB and had episode of decreased alertness, requiring dependence to transition to supine. Once pt was alert, he completed RUE HEP with assist. He reports living with SO who can assist. Will defer recommendation on DC disposition at this time d/t decreased cooperation and limited assessment. He will need reinforcement on all teaching content.      Anticipated Discharge Disposition (OT): inpatient rehabilitation facility (based on limited eval, will reassess at next visit)

## 2024-02-01 NOTE — ANESTHESIA PROCEDURE NOTES
Peripheral Block      Patient reassessed immediately prior to procedure    Patient location during procedure: pre-op  Reason for block: at surgeon's request and post-op pain management  Performed by  CRNA/CAA: Aly Alvarez, CRNA  Assisted by: Kathia Arizmendi RN  Preanesthetic Checklist  Completed: patient identified, IV checked, site marked, risks and benefits discussed, surgical consent, monitors and equipment checked, pre-op evaluation and timeout performed  Prep:  Pt Position: left lateral decubitus  Sterile barriers:cap, gloves, mask and washed/disinfected hands  Prep: ChloraPrep  Patient monitoring: blood pressure monitoring, continuous pulse oximetry and EKG  Procedure    Sedation: yes  Performed under: local infiltration  Guidance:ultrasound guided    ULTRASOUND INTERPRETATION.  Using ultrasound guidance a 20 G gauge needle was placed in close proximity to the nerve, at which point, under ultrasound guidance anesthetic was injected in the area of the nerve and spread of the anesthesia was seen on ultrasound in close proximity thereto.  There were no abnormalities seen on ultrasound; a digital image was taken; and the patient tolerated the procedure with no complications. Images:still images obtained, printed/placed on chart    Laterality:right  Block Type:interscalene  Injection Technique:catheter  Needle Type:echogenic  Needle Gauge:20 G  Resistance on Injection: none  Catheter Size:20 G (20g)  Cath Depth at skin: 6 cm    Medications Used: fentaNYL citrate (PF) (SUBLIMAZE) injection - Intravenous   100 mcg - 2/1/2024 8:22:00 AM  bupivacaine PF (MARCAINE) 0.25 % injection - Injection   15 mL - 2/1/2024 8:42:00 AM      Medications  Preservative Free Saline:5ml    Post Assessment  Injection Assessment: negative aspiration for heme, no paresthesia on injection and incremental injection  Patient Tolerance:comfortable throughout block  Complications:no  Additional Notes  CATHETER

## 2024-02-01 NOTE — NURSING NOTE
Patient sitting on side of bed with speaking with Occupational therapist.  Pts eyes rolled back and dropped back against bed rail behind him. RN and OT were able to move pt to supine position on the stretcher.  Pt awake and answering questions appropriately within 30 secs of laying flat.  RN examined the posterior of pts head, no bruising or bumps were found.  Pt was asked if he had any pain or headache and he said none.  Dr. Fang was notified and advised to consult dr PAGE and plan for pt to stay overnight as a precaution.   Dr. PAGE was notified as well.

## 2024-02-01 NOTE — ANESTHESIA PREPROCEDURE EVALUATION
Anesthesia Evaluation     Patient summary reviewed and Nursing notes reviewed   no history of anesthetic complications:   NPO Solid Status: > 8 hours  NPO Liquid Status: > 2 hours           Airway   Mallampati: II  TM distance: >3 FB  Neck ROM: full  No difficulty expected  Dental    (+) edentulous    Pulmonary - normal exam   (+) a smoker Former,  Cardiovascular - normal exam    ECG reviewed    (+) hypertension, hyperlipidemia  (-) dysrhythmias, angina      Neuro/Psych- negative ROS  GI/Hepatic/Renal/Endo    (+) hepatitis (per pt treated in past and no longer has) C, liver disease cirrhosis, renal disease (h/o renal cell ca; s/p partial nephrectomy)- CRI  (-) GERD, diabetes, no thyroid disorder    ROS Comment: Pt had a fistula placed in left arm for possible dialysis after history of renal cell; pt never required dialysis and AVF never used and no longer working: no thrill noted    Musculoskeletal     Abdominal    Substance History      OB/GYN          Other      history of cancer remission    ROS/Med Hx Other: K 2/1/24 from Istat: 4.8  Istat DOS:   Hb/hct: 12.8/37              Anesthesia Plan    ASA 3     general with block     intravenous induction     Anesthetic plan, risks, benefits, and alternatives have been provided, discussed and informed consent has been obtained with: patient.    Plan discussed with CRNA.    CODE STATUS:

## 2024-02-01 NOTE — H&P
Patient Name: Nolberto Jackson Jr.  MRN: [id number]  : 1955  DOS: 2024    Attending: Jhon Fang MD    Primary Care Provider: Tammy Myers MD      Chief complaint: Left shoulder pain    Subjective   Patient is a pleasant 68 y.o. male presented for scheduled surgery by Dr. Fang.    His shoulder has been painful with limited ROM for about 8 years. He denies numbness, tingling or difficulty with  right hand. Conservative treatments failed to provide lasting benefits.      Patient underwent right total shoulder arthroplasty and left shoulder injection of corticosteroid.  Surgery was done under GA and a block, he tolerated surgery well, was admitted for further management.    I saw him in PACU where he is doing fairly well.  No complaints of nausea, vomiting, or shortness breath.    Reviewed with patient his past medical history and home medications.    Patient was seen earlier by Occupational Therapy, did not pass mobility screen, had decreased alertness at the time.  I saw him after a few hours, he is back to his baseline.  Did require in and out catheterization.    Allergies   Allergen Reactions    Diphenhydramine Hcl Delirium    Midazolam Other (See Comments)     Other reaction(s): N+V - Nausea and vomiting, N+V - Nausea and vomiting    Zolpidem Delirium     Other reaction(s): nightmares, nightmares       Medications Prior to Admission   Medication Sig Dispense Refill Last Dose    allopurinol (ZYLOPRIM) 100 MG tablet Take 1 tablet by mouth Daily.   2024    amLODIPine (NORVASC) 10 MG tablet Take 1 tablet by mouth Daily.   2024    benzoyl peroxide 5 % external wash use as directed by Dr. Fang 148 mL 0 2024    carvedilol (COREG) 3.125 MG tablet Take 1 tablet by mouth 2 (Two) Times a Day.   2024 at 0630    pravastatin (PRAVACHOL) 40 MG tablet Take 1 tablet by mouth Daily.   2024 at 0630         Past Medical History:   Diagnosis Date    Colon polyp     HX OF  "   Dialysis patient     NO DILAYSIS SINCE 2009    Fistula     LEFT ARM, DOES NOT WORK    Gout     Hard of hearing     Hearing aid worn     RIGHT EAR    History of hepatitis C     treated 2009    History of kidney cancer 2017    LEFT KIDNEY    History of transfusion     PATIENT DENIES REACTION    Hyperlipidemia     Hypertension     Liver cirrhosis     MRSA infection     D/T COLON CANCER SURGERY    Wears dentures     UPPER AND LOWER     Past Surgical History:   Procedure Laterality Date    COLECTOMY PARTIAL / TOTAL Left     , 2006    COLONOSCOPY      NEPHRECTOMY Left     URETERECTOMY Left 2007    cancer     History reviewed. No pertinent family history.  Social History     Tobacco Use    Smoking status: Former     Types: Cigarettes     Quit date:      Years since quittin.0    Smokeless tobacco: Never   Vaping Use    Vaping Use: Never used   Substance Use Topics    Alcohol use: Not Currently    Drug use: Not Currently       Review of Systems  Pertinent items are noted in HPI    Vital Signs  /83   Pulse 60   Temp 97 °F (36.1 °C) (Tympanic)   Resp 12   Ht 180.3 cm (71\")   Wt 91.2 kg (201 lb)   SpO2 95%   BMI 28.03 kg/m²     Physical Exam:    General Appearance:    Alert, cooperative, in no acute distress   Head:    Normocephalic, without obvious abnormality, atraumatic   Eyes:            Lids and lashes normal, conjunctivae and sclerae normal, no   icterus, no pallor, corneas clear,    Ears:    Ears appear intact with no abnormalities noted   Throat:   No oral lesions, no thrush, oral mucosa moist   Neck:   No adenopathy, supple, trachea midline, no thyromegaly         Lungs:     Clear to auscultation,respirations regular, even and                   unlabored    Heart:    Regular rhythm and normal rate, normal S1 and S2, no      murmur    Abdomen:     Normal bowel sounds, no masses, no organomegaly, soft        non-tender, non-distended, no guarding, no rebound                 " "tenderness   Genitalia:    Deferred   Extremities: Right UE in a sling, CDI Aquacel dressing shoulder. Interscalene nerve block cath present.  Distal pulses, cap refill, movements of fingers, wrist, intact.     Pulses:   Pulses palpable and equal bilaterally   Skin:   No bleeding, bruising or rash   Neurologic:   Cranial nerves 2 - 12 grossly intact      I reviewed the patient's new clinical results.       Results from last 7 days   Lab Units 02/01/24  0805   HEMOGLOBIN, POC g/dL 12.6   HEMATOCRIT POC % 37*           Invalid input(s): \"LABALBU\", \"PROT\"  Lab Results   Component Value Date    HGBA1C 5.10 01/18/2024      Latest Reference Range & Units 01/18/24 10:32   Sodium 136 - 145 mmol/L 140   Potassium 3.5 - 5.2 mmol/L 5.0   Chloride 98 - 107 mmol/L 107   CO2 22.0 - 29.0 mmol/L 25.0   Anion Gap 5.0 - 15.0 mmol/L 8.0   BUN 8 - 23 mg/dL 44 (H)   Creatinine 0.76 - 1.27 mg/dL 2.92 (H)   BUN/Creatinine Ratio 7.0 - 25.0  15.1   eGFR >60.0 mL/min/1.73 22.9 (L)   Glucose 65 - 99 mg/dL 145 (H)   Calcium 8.6 - 10.5 mg/dL 9.6   (H): Data is abnormally high  (L): Data is abnormally low       Latest Reference Range & Units 01/18/24 10:32   WBC 3.40 - 10.80 10*3/mm3 6.95   RBC 4.14 - 5.80 10*6/mm3 4.24   Hemoglobin 13.0 - 17.7 g/dL 13.1   Hematocrit 37.5 - 51.0 % 39.6   Platelets 140 - 450 10*3/mm3 147   RDW 12.3 - 15.4 % 13.2   MCV 79.0 - 97.0 fL 93.4   MCH 26.6 - 33.0 pg 30.9   MCHC 31.5 - 35.7 g/dL 33.1   MPV 6.0 - 12.0 fL 9.8   RDW-SD 37.0 - 54.0 fl 44.8     Assessment and Plan:       Status post total shoulder arthroplasty, right    Glenohumeral arthritis, right    HTN (hypertension)    Hyperlipidemia  Chronic kidney disease, solitary kidney following nephrectomy    Plan    1. PT/OT. NWB, right UE, ROM hand, wrist, elbow.  2. Pain control-prns, interscalene nerve block cath with ropivacaine infusion.   3. IS-encourage  4. DVT proph- Mech/ mobilize.  5. Bowel regimen  6. Resume home medications as appropriate  7. Monitor " post-op labs  8. DC planning , pending further sessions with occupational therapy    - Hypertension:  Resume home medications as appropriate, formulary substitution when indicated.  Holding parameters.  Prn medications for elevated blood pressure.    -Dyslipidemia:  Resume home regimen, statin.  ( formulary substitution when appropriate).      Dragon disclaimer:  Part of this encounter note is an electronic transcription/translation of spoken language to printed text. The electronic translation of spoken language may permit erroneous, or at times, nonsensical words or phrases to be inadvertently transcribed; Although I have reviewed the note for such errors, some may still exist.    Pito Nassar MD  02/01/24  17:11 EST

## 2024-02-01 NOTE — DISCHARGE INSTRUCTIONS
InfuBLOCK - Patient Information    What is a pain pump?  The InfuBLOCK pump delivers post-operative, non-narcotic, numbing medication to the nerve near the surgical site for pain relief.     Where can I find information about my pain pump?           For more information about your pain pump, scan the QR code.  For additional patient resources, visit [email or web address].                                                                                               While your physician is your primary source for information about your treatment there may be times during your treatment that you need assistance with your infusion pump.     If you need assistance take the following steps:    The [facility] Nursing Hotline is Here for You 24/7.  Please call [phone] for the following concerns or complications:    Answers to questions about your infusion pump                 Tubing disconnect  Assistance with pump alarms                                                      Dislodged catheter  Excessive leakage noted from pump                                         Inadequate pain control    2.   [facility] Anesthesia Acute Pain Service: [phone] is available 24/7 for any further needs or concerns about medication or pain control.     -------------------------------------------------------------------------    Nerve Catheter Removal Instructions  When your device is empty:    Remove your catheter by pulling the dressing off slowly (like you would remove a regular bandage). The catheter should pull right out of the skin.  Check that the BLUE tip is intact.                                                                                     If the catheter is stuck, reposition your   extremity and pull slowly until removed.  *If catheter is HURTING and WON'T come out, stop and call [phone] for further assistance.    Remove medication bag from the black carrying case.  Cut the  tubing on right and left side of pump, and discard the medication bag and tubing into garbage.  Place the pump and black carrying case into the plastic bag and then place this into the return box.  Seal box with blue stickers and return to [place] postal service. THIS IS PRE-PAID POSTAGE.        -------------------------------------------------------------------------    [facility] COLD THERAPY - PATIENT INSTRUCTION SHEET    Cold Compression Therapy for your comfort and rehabilitation  Your caregivers want you to be productive in your rehab and comfortable during your stay. In keeping with those goals, you will be receiving an SMI Cold Therapy Wrap to help ease post-operative pain and swelling that might keep you from getting back on track! Your SMI Cold Therapy Wrap is effective and simple-to-use, and you will be encouraged to apply it throughout your hospital stay and at home through the duration of your recovery.    When you are ready to go home  Be sure to take your SMI Cold Therapy Wrap and both sets of Gel Bags with you for continued comfort and use throughout your rehabilitation. If you don't already have them, ask your nurse or aide to retrieve your SMI Gel Bags from the patient freezer.    Home use precautions  Always follow your medical professional's application instructions upon discharge. Your SMI Cold Therapy Wrap and Gel Bags are designed to last for months following your surgery. Never heat the Gel Bags unless specified by your healthcare provider. Supervision is advised when using this product on children or geriatric patients. To avoid danger of suffocation, please keep the outer plastic packaging away from children & pets.    Cold Therapy Instructions  Place Gel Bags in a freezer set ¾ of the way to max temperature for at least (4) hours. For best results, lay the Gel Bags flat and [id number] in the freezer. Once frozen, slide Gel Bags into the gel pouch and secure your wrap to the affected area with the  straps.  Gel wraps that have been stored in a freezer for an extended period of time may require a (10) minute period of softening up in a room temperature environment before application.  The gel pouch acts as a protective barrier. NEVER place frozen bags directly onto skin, as this may cause frostbite injury.  The [facility] Cold Therapy Wrap is designed to be able to be worm while ambulating. The compression straps can be secured well enough so that the Wrap won't fall off while moving.  Wrap Application Videos can be viewed at [email or web address].  An additional protective barrier such as clothing, a washcloth, hand-towel or pillowcase may be used during prolonged treatment applications.  The Gel-Pouch and Wrap are both Latex-Free and the Gel Bag ingredients are non toxic.    [facility] Wrap care instructions  The [facility] Cold Therapy Wrap may be hand washed and hung to dry when needed.    [facility] re-order information  Additional [facility] body specific wraps and/or Gel Bags can be re-ordered from [email or web address] or call [email or web address] ([phone])

## 2024-02-01 NOTE — ANESTHESIA POSTPROCEDURE EVALUATION
"Subjective:   Payal Rubin Patient: Nolberto Jackson Jr.    Procedure Summary       Date: 02/01/24 Room / Location:  [facility]  /  [facility]    Anesthesia Start: 0952 Anesthesia Stop: 1157    Procedure: TOTAL SHOULDER ARTHROPLASTY - RIGHT LEFT SHOULDER STEROID INJECTION (Right: Shoulder) Diagnosis:       Glenohumeral arthritis, right      Glenohumeral arthritis, left      (Bilateral glenohumeral arthritis)    Surgeons: Jhon Fang MD Provider: Agustina Nascimento MD    Anesthesia Type: general with block ASA Status: 3            Anesthesia Type: general with block    Vitals  Vitals Value Taken Time   /70 02/01/24 1157   Temp 97 °F (36.1 °C) 02/01/24 1157   Pulse 63 02/01/24 1157   Resp 12 02/01/24 1157   SpO2 97 % 02/01/24 1157           Post Anesthesia Care and Evaluation    Patient location during evaluation: PACU  Patient participation: complete - patient participated  Level of consciousness: awake and alert  Pain score: 0  Pain management: adequate    Airway patency: patent  Anesthetic complications: No anesthetic complications  PONV Status: none  Cardiovascular status: hemodynamically stable and acceptable  Respiratory status: nonlabored ventilation, acceptable and nasal cannula  Hydration status: acceptable     

## 2024-02-01 NOTE — H&P
Pre-Op H&P  Nolberto Jackson Jr.  [id number]  1955      Chief complaint: Right shoulder pain      Subjective:  Patient is a 68 y.o.male presents for scheduled surgery by Dr. Fang. He anticipates a TOTAL SHOULDER ARTHROPLASTY - RIGHT  today. His shoulder has been painful with limited ROM for about 8 years. He denies numbness, tingling or difficulty with  right hand. Conservative treatments failed to provide lasting benefits.       Review of Systems:  Constitutional-- No fever, chills or sweats. No fatigue.  CV-- No chest pain, palpitation or syncope. +HTN, HLD  Resp-- No SOB, cough, hemoptysis  Skin--No rashes or lesions      Allergies:   Allergies   Allergen Reactions    Diphenhydramine Hcl Delirium    Midazolam Other (See Comments)     Other reaction(s): N+V - Nausea and vomiting, N+V - Nausea and vomiting    Zolpidem Delirium     Other reaction(s): nightmares, nightmares         Home Meds:  Medications Prior to Admission   Medication Sig Dispense Refill Last Dose    allopurinol (ZYLOPRIM) 100 MG tablet Take 1 tablet by mouth Daily.   2/1/2024    amLODIPine (NORVASC) 10 MG tablet Take 1 tablet by mouth Daily.   2/1/2024    benzoyl peroxide 5 % external wash use as directed by Dr. Fang 148 mL 0 2/1/2024    carvedilol (COREG) 3.125 MG tablet Take 1 tablet by mouth 2 (Two) Times a Day.   2/1/2024 at 0630    pravastatin (PRAVACHOL) 40 MG tablet Take 1 tablet by mouth Daily.   2/1/2024 at 0630         PMH:   Past Medical History:   Diagnosis Date    Colon polyp     HX OF    Dialysis patient     NO DILAYSIS SINCE 2009    Fistula     LEFT ARM, DOES NOT WORK    Gout     Hard of hearing     Hearing aid worn     RIGHT EAR    History of kidney cancer 2017    LEFT KIDNEY    History of transfusion     PATIENT DENIES REACTION    Hyperlipidemia     Hypertension     Liver cirrhosis     MRSA infection 2005    D/T COLON CANCER SURGERY    Wears dentures     UPPER AND LOWER     PSH:    Past Surgical History:  "  Procedure Laterality Date    COLECTOMY PARTIAL / TOTAL Left     , 2006    COLONOSCOPY      NEPHRECTOMY Left     URETERECTOMY Left 2007    cancer       Immunization History:  Influenza: UTD  Pneumococcal: UTD  Tetanus: UTD  Covid : x4    Social History:   Tobacco:   Social History     Tobacco Use   Smoking Status Former    Types: Cigarettes    Quit date:     Years since quittin.0   Smokeless Tobacco Never      Alcohol:     Social History     Substance and Sexual Activity   Alcohol Use Not Currently         Physical Exam:/99 (BP Location: Left leg, Patient Position: Sitting)   Pulse 76   Temp 97.6 °F (36.4 °C) (Temporal)   Resp 18   Ht 180.3 cm (71\")   Wt 91.2 kg (201 lb)   SpO2 97%   BMI 28.03 kg/m²       General Appearance:    Alert, cooperative, no distress, appears stated age   Head:    Normocephalic, without obvious abnormality, atraumatic   Lungs:     Clear to auscultation bilaterally, respirations unlabored    Heart:   Regular rate and rhythm, S1 and S2 normal    Abdomen:    Soft without tenderness   Extremities:   Extremities normal, atraumatic, no cyanosis or edema   Skin:   Skin color, texture, turgor normal, no rashes or lesions   Neurologic:   Grossly intact     Results Review:     LABS:  Lab Results   Component Value Date    WBC 6.95 2024    HGB 13.1 2024    HCT 39.6 2024    MCV 93.4 2024     2024    NEUTROABS 5.03 2024    GLUCOSE 145 (H) 2024    BUN 44 (H) 2024    CREATININE 2.92 (H) 2024    EGFRIFNONA 26 (L) 10/22/2019     2024    K 5.0 2024     2024    CO2 25.0 2024    PHOS 2.7 2018    CALCIUM 9.6 2024    ALBUMIN 4.5 2024    AST 15 2024    ALT 16 2024    BILITOT 0.3 2024       RADIOLOGY:  Imaging Results (Last 72 Hours)       ** No results found for the last 72 hours. **            I reviewed the patient's new clinical results.    Cancer Staging " (if applicable)  Cancer Patient: __ yes __no __unknown; If yes, clinical stage T:__ N:__M:__, stage group or __N/A      Impression: Right shoulder pain      Plan: TOTAL SHOULDER ARTHROPLASTY - RIGHT       RAYNE Valderrama   2/1/2024   08:11 EST

## 2024-02-02 VITALS
HEIGHT: 71 IN | HEART RATE: 96 BPM | SYSTOLIC BLOOD PRESSURE: 176 MMHG | WEIGHT: 201 LBS | RESPIRATION RATE: 18 BRPM | OXYGEN SATURATION: 99 % | TEMPERATURE: 97.8 F | DIASTOLIC BLOOD PRESSURE: 90 MMHG | BODY MASS INDEX: 28.14 KG/M2

## 2024-02-02 LAB
ANION GAP SERPL CALCULATED.3IONS-SCNC: 15 MMOL/L (ref 5–15)
BASOPHILS # BLD AUTO: 0.01 10*3/MM3 (ref 0–0.2)
BASOPHILS NFR BLD AUTO: 0.1 % (ref 0–1.5)
BUN SERPL-MCNC: 41 MG/DL (ref 8–23)
BUN/CREAT SERPL: 15.8 (ref 7–25)
CALCIUM SPEC-SCNC: 8.6 MG/DL (ref 8.6–10.5)
CHLORIDE SERPL-SCNC: 107 MMOL/L (ref 98–107)
CO2 SERPL-SCNC: 15 MMOL/L (ref 22–29)
CREAT SERPL-MCNC: 2.59 MG/DL (ref 0.76–1.27)
DEPRECATED RDW RBC AUTO: 44.4 FL (ref 37–54)
EGFRCR SERPLBLD CKD-EPI 2021: 26.5 ML/MIN/1.73
EOSINOPHIL # BLD AUTO: 0 10*3/MM3 (ref 0–0.4)
EOSINOPHIL NFR BLD AUTO: 0 % (ref 0.3–6.2)
ERYTHROCYTE [DISTWIDTH] IN BLOOD BY AUTOMATED COUNT: 13.1 % (ref 12.3–15.4)
GLUCOSE SERPL-MCNC: 115 MG/DL (ref 65–99)
HCT VFR BLD AUTO: 36.1 % (ref 37.5–51)
HGB BLD-MCNC: 11.9 G/DL (ref 13–17.7)
IMM GRANULOCYTES # BLD AUTO: 0.06 10*3/MM3 (ref 0–0.05)
IMM GRANULOCYTES NFR BLD AUTO: 0.7 % (ref 0–0.5)
LYMPHOCYTES # BLD AUTO: 0.23 10*3/MM3 (ref 0.7–3.1)
LYMPHOCYTES NFR BLD AUTO: 2.5 % (ref 19.6–45.3)
MCH RBC QN AUTO: 30.7 PG (ref 26.6–33)
MCHC RBC AUTO-ENTMCNC: 33 G/DL (ref 31.5–35.7)
MCV RBC AUTO: 93 FL (ref 79–97)
MONOCYTES # BLD AUTO: 0.3 10*3/MM3 (ref 0.1–0.9)
MONOCYTES NFR BLD AUTO: 3.3 % (ref 5–12)
NEUTROPHILS NFR BLD AUTO: 8.52 10*3/MM3 (ref 1.7–7)
NEUTROPHILS NFR BLD AUTO: 93.4 % (ref 42.7–76)
NRBC BLD AUTO-RTO: 0 /100 WBC (ref 0–0.2)
PLATELET # BLD AUTO: 138 10*3/MM3 (ref 140–450)
PMV BLD AUTO: 10.3 FL (ref 6–12)
POTASSIUM SERPL-SCNC: 5.3 MMOL/L (ref 3.5–5.2)
RBC # BLD AUTO: 3.88 10*6/MM3 (ref 4.14–5.8)
SODIUM SERPL-SCNC: 137 MMOL/L (ref 136–145)
WBC NRBC COR # BLD AUTO: 9.12 10*3/MM3 (ref 3.4–10.8)

## 2024-02-02 PROCEDURE — 63710000001 CARVEDILOL 3.125 MG TABLET: Performed by: INTERNAL MEDICINE

## 2024-02-02 PROCEDURE — 85025 COMPLETE CBC W/AUTO DIFF WBC: CPT | Performed by: ORTHOPAEDIC SURGERY

## 2024-02-02 PROCEDURE — 97110 THERAPEUTIC EXERCISES: CPT

## 2024-02-02 PROCEDURE — A9270 NON-COVERED ITEM OR SERVICE: HCPCS | Performed by: INTERNAL MEDICINE

## 2024-02-02 PROCEDURE — G0378 HOSPITAL OBSERVATION PER HR: HCPCS

## 2024-02-02 PROCEDURE — 63710000001 PRAVASTATIN 40 MG TABLET: Performed by: INTERNAL MEDICINE

## 2024-02-02 PROCEDURE — 97535 SELF CARE MNGMENT TRAINING: CPT

## 2024-02-02 PROCEDURE — 97116 GAIT TRAINING THERAPY: CPT

## 2024-02-02 PROCEDURE — 63710000001 SODIUM BICARBONATE 650 MG TABLET: Performed by: INTERNAL MEDICINE

## 2024-02-02 PROCEDURE — 25010000002 CEFAZOLIN PER 500 MG: Performed by: ORTHOPAEDIC SURGERY

## 2024-02-02 PROCEDURE — 63710000001 SODIUM ZIRCONIUM CYCLOSILICATE 10 G PACK: Performed by: INTERNAL MEDICINE

## 2024-02-02 PROCEDURE — 97530 THERAPEUTIC ACTIVITIES: CPT

## 2024-02-02 PROCEDURE — 80048 BASIC METABOLIC PNL TOTAL CA: CPT | Performed by: ORTHOPAEDIC SURGERY

## 2024-02-02 PROCEDURE — 63710000001 ALLOPURINOL 100 MG TABLET: Performed by: INTERNAL MEDICINE

## 2024-02-02 PROCEDURE — 97161 PT EVAL LOW COMPLEX 20 MIN: CPT

## 2024-02-02 RX ORDER — ROPIVACAINE HYDROCHLORIDE 2 MG/ML
1 INJECTION, SOLUTION EPIDURAL; INFILTRATION; PERINEURAL CONTINUOUS
Start: 2024-02-02

## 2024-02-02 RX ORDER — SODIUM BICARBONATE 650 MG/1
650 TABLET ORAL 2 TIMES DAILY
Status: DISCONTINUED | OUTPATIENT
Start: 2024-02-02 | End: 2024-02-02 | Stop reason: HOSPADM

## 2024-02-02 RX ADMIN — ALLOPURINOL 100 MG: 100 TABLET ORAL at 08:01

## 2024-02-02 RX ADMIN — PRAVASTATIN SODIUM 40 MG: 40 TABLET ORAL at 08:01

## 2024-02-02 RX ADMIN — SODIUM ZIRCONIUM CYCLOSILICATE 10 G: 10 POWDER, FOR SUSPENSION ORAL at 12:25

## 2024-02-02 RX ADMIN — SODIUM CHLORIDE 2000 MG: 900 INJECTION INTRAVENOUS at 02:10

## 2024-02-02 RX ADMIN — CARVEDILOL 3.12 MG: 3.12 TABLET, FILM COATED ORAL at 08:01

## 2024-02-02 RX ADMIN — SODIUM BICARBONATE 650 MG TABLET 650 MG: at 12:25

## 2024-02-02 NOTE — CASE MANAGEMENT/SOCIAL WORK
Continued Stay Note  [facility]     Patient Name: Nolberto Jackson Jr.  MRN: [id number]  Today's Date: 2/2/2024    Admit Date: 2/1/2024        Discharge Plan       Row Name 02/02/24 1112       Plan    Plan Comments Spoke with patient at bedside, nita recommends  for home safety, with patient agreeable. Per conversation with patient referral sent to [facility]. Pt denies any other discharge needs. CM will cont to follow.    Final Discharge Disposition Code 06 - home with home health care                   Discharge Codes    No documentation.                 Expected Discharge Date and Time       Expected Discharge Date Expected Discharge Time    Feb 1, 2024               Solange Hernandez RN

## 2024-02-02 NOTE — PLAN OF CARE
Goal Outcome Evaluation:  Plan of Care Reviewed With: patient           Outcome Evaluation: PT initial evaluation completed for pt s/p R TSA presenting with generalized weakness, impaired balance, decreased safety awareness, and decreased functional mobility. Pt ambulated 450ft with CGA and ascended/descended 2 steps with Fatemeh. Pt's decreased independence warrants PT skilled care. Recommend D/C home with assistance and  PT services.      Anticipated Discharge Disposition (PT): home with assist, home with home health

## 2024-02-02 NOTE — PLAN OF CARE
Problem: Adult Inpatient Plan of Care  Goal: Plan of Care Review  Outcome: Ongoing, Progressing  Flowsheets (Taken 2/1/2024 1911)  Plan of Care Reviewed With: patient  Outcome Evaluation: Patient alert and oriented. Oriented to room and call light. Sx drsg to right shoulder CDI. Pain controlled at this time, patient rates pain 4/10 but declines intervention stating he feels okay. Incentive Spirometer provided, patient instructed on use. Continue with current POC.  Goal: Patient-Specific Goal (Individualized)  Outcome: Ongoing, Progressing  Goal: Absence of Hospital-Acquired Illness or Injury  Outcome: Ongoing, Progressing  Goal: Optimal Comfort and Wellbeing  Outcome: Ongoing, Progressing  Intervention: Monitor Pain and Promote Comfort  Recent Flowsheet Documentation  Taken 2/1/2024 1800 by Honey Valentin RN  Pain Management Interventions: no interventions per patient request  Intervention: Provide Person-Centered Care  Recent Flowsheet Documentation  Taken 2/1/2024 1800 by Honey Valentin RN  Trust Relationship/Rapport:   care explained   questions answered   questions encouraged  Goal: Readiness for Transition of Care  Outcome: Ongoing, Progressing     Problem: Adjustment to Surgery (Shoulder Arthroplasty)  Goal: Optimal Coping  Outcome: Ongoing, Progressing     Problem: Bleeding (Shoulder Arthroplasty)  Goal: Absence of Bleeding  Outcome: Ongoing, Progressing     Problem: Bowel Motility Impaired (Shoulder Arthroplasty)  Goal: Effective Bowel Elimination  Outcome: Ongoing, Progressing     Problem: Fluid and Electrolyte Imbalance (Shoulder Arthroplasty)  Goal: Fluid and Electrolyte Balance  Outcome: Ongoing, Progressing     Problem: Functional Ability Impaired (Shoulder Arthroplasty)  Goal: Optimal Functional Ability  Outcome: Ongoing, Progressing     Problem: Infection (Shoulder Arthroplasty)  Goal: Absence of Infection Signs and Symptoms  Outcome: Ongoing, Progressing     Problem: Neurovascular Compromise  (Shoulder Arthroplasty)  Goal: Intact Neurovascular Status  Outcome: Ongoing, Progressing     Problem: Ongoing Anesthesia Effects (Shoulder Arthroplasty)  Goal: Anesthesia/Sedation Recovery  Outcome: Ongoing, Progressing  Intervention: Optimize Anesthesia Recovery  Recent Flowsheet Documentation  Taken 2/1/2024 1800 by Honey Valentin RN  Administration (IS): (patient states he knows how to use and will use IS later)   instruction provided, initial   other (see comments)  Incentive Spirometer Predicted Level (mL): 2000     Problem: Pain (Shoulder Arthroplasty)  Goal: Acceptable Pain Control  Outcome: Ongoing, Progressing  Intervention: Prevent or Manage Pain  Recent Flowsheet Documentation  Taken 2/1/2024 1800 by Honey Valentin RN  Pain Management Interventions: no interventions per patient request  Diversional Activities: television     Problem: Postoperative Nausea and Vomiting (Shoulder Arthroplasty)  Goal: Nausea and Vomiting Relief  Outcome: Ongoing, Progressing     Problem: Postoperative Urinary Retention (Shoulder Arthroplasty)  Goal: Effective Urinary Elimination  Outcome: Ongoing, Progressing   Goal Outcome Evaluation:  Plan of Care Reviewed With: patient           Outcome Evaluation: Patient alert and oriented. Oriented to room and call light. Sx drsg to right shoulder CDI. Pain controlled at this time, patient rates pain 4/10 but declines intervention stating he feels okay. Incentive Spirometer provided, patient instructed on use. Continue with current POC.

## 2024-02-02 NOTE — THERAPY EVALUATION
Patient Name: Nolberto Jackson Jr.  : 1955    MRN: [id number]                              Today's Date: 2024       Admit Date: 2024    Visit Dx:     ICD-10-CM ICD-9-CM   1. Glenohumeral arthritis, right  M19.011 715.91     Patient Active Problem List   Diagnosis    Glenohumeral arthritis, right    HTN (hypertension)    Hyperlipidemia    Status post total shoulder arthroplasty, right     Past Medical History:   Diagnosis Date    Colon polyp     HX OF    Dialysis patient     NO DILAYSIS SINCE     Fistula     LEFT ARM, DOES NOT WORK    Gout     Hard of hearing     Hearing aid worn     RIGHT EAR    History of hepatitis C     treated 2009    History of kidney cancer 2017    LEFT KIDNEY    History of transfusion     PATIENT DENIES REACTION    Hyperlipidemia     Hypertension     Liver cirrhosis     MRSA infection     D/T COLON CANCER SURGERY    Wears dentures     UPPER AND LOWER     Past Surgical History:   Procedure Laterality Date    COLECTOMY PARTIAL / TOTAL Left     , 2006    COLONOSCOPY      NEPHRECTOMY Left     URETERECTOMY Left 2007    cancer      General Information       Row Name 24 1115          Physical Therapy Time and Intention    Document Type evaluation  -KG     Mode of Treatment physical therapy  -KG       Row Name 24 1115          General Information    Patient Profile Reviewed yes  -KG     Prior Level of Function independent:;all household mobility;gait;transfer;ADL's;dressing;bathing  -KG     Existing Precautions/Restrictions fall;right;non-weight bearing;shoulder;other (see comments)  interscalene nerve cath; [place] Ultra II sling without pillow  -KG     Barriers to Rehab hearing deficit  -KG       Row Name 24 1115          Living Environment    People in Home significant other  -KG       Row Name 24 1115          Home Main Entrance    Number of Stairs, Main Entrance one  -KG     Stair Railings, Main Entrance none  -KG       Row Name 24 1115           Stairs Within Home, Primary    Number of Stairs, Within Home, Primary none  -KG       Row Name 02/02/24 1115          Cognition    Orientation Status (Cognition) oriented x 3  -KG       Row Name 02/02/24 1115          Safety Issues, Functional Mobility    Safety Issues Affecting Function (Mobility) at risk behavior observed;awareness of need for assistance;impulsivity;insight into deficits/self-awareness;safety precaution awareness;safety precautions follow-through/compliance  -KG     Impairments Affecting Function (Mobility) balance;cognition;coordination;endurance/activity tolerance;postural/trunk control;strength  -KG     Cognitive Impairments, Mobility Safety/Performance attention;awareness, need for assistance;impulsivity;insight into deficits/self-awareness;safety precaution awareness;safety precaution follow-through  -KG               User Key  (r) = Recorded By, (t) = Taken By, (c) = Cosigned By      Initials Name Provider Type    KG Elizabeth Vázquez, PT Physical Therapist                   Mobility       Row Name 02/02/24 1116          Bed Mobility    Comment, (Bed Mobility) Pt seated EOB upon arrival and at end of evaluation.  -KG       Row Name 02/02/24 1116          Transfers    Comment, (Transfers) VC's for sequencing and increased safety awareness. Pt attempting to stand prior to instruction.  -KG       Row Name 02/02/24 1116          Sit-Stand Transfer    Sit-Stand [place] (Transfers) standby assist;verbal cues  -KG       Row Name 02/02/24 1116          Gait/Stairs (Locomotion)    [place] Level (Gait) contact guard;verbal cues  -KG     Distance in Feet (Gait) 450  -KG     Deviations/Abnormal Patterns (Gait) bilateral deviations;santiago decreased;stride length decreased  -KG     Bilateral Gait Deviations forward flexed posture  -KG     [place] Level (Stairs) minimum assist (75% patient effort);verbal cues  -KG     Handrail Location (Stairs) none  -KG     Number of Steps  (Stairs) 2 x2  -KG     Ascending Technique (Stairs) step-to-step  -KG     Descending Technique (Stairs) step-to-step  -KG     Stairs, Safety Issues balance decreased during turns  -KG     Stairs, Impairments impaired balance;coordination impaired  -KG     Comment, (Gait/Stairs) Pt ambulated 450ft with CGA demonstrating slow santiago with decreased step length. VC's for upright posture with forward gaze. Pt demonstrated adequate balance and stability. Pt ascended/descended 2 steps x2 with step to step pattern. Pt required increased cueing for proper technique. Decreased balance and coordination noted during stair training. Pt very impulsive at times; limited by poor safety awareness.  -KG       Row Name 02/02/24 1116          Mobility    Extremity Weight-bearing Status right upper extremity  -KG     Right Upper Extremity (Weight-bearing Status) non weight-bearing (NWB)  -KG               User Key  (r) = Recorded By, (t) = Taken By, (c) = Cosigned By      Initials Name Provider Type    KG Elizabeth Vázquez, PT Physical Therapist                   Obj/Interventions       Row Name 02/02/24 1119          Range of Motion Comprehensive    General Range of Motion no range of motion deficits identified  -KG     Comment, General Range of Motion B LE WFL  -KG       Row Name 02/02/24 1119          Strength Comprehensive (MMT)    Comment, General Manual Muscle Testing (MMT) Assessment B LE grossly 3+/5  -KG       Row Name 02/02/24 1119          Balance    Balance Assessment sitting static balance;standing static balance;standing dynamic balance  -KG     Static Sitting Balance independent  -KG     Position, Sitting Balance unsupported;sitting edge of bed  -KG     Static Standing Balance standby assist  -KG     Dynamic Standing Balance contact guard  -KG     Position/Device Used, Standing Balance unsupported  -KG       Row Name 02/02/24 1119          Sensory Assessment (Somatosensory)    Sensory Assessment (Somatosensory) LE  sensation intact  -KG               User Key  (r) = Recorded By, (t) = Taken By, (c) = Cosigned By      Initials Name Provider Type    KG Elizabeth Vázquez N, PT Physical Therapist                   Goals/Plan       Row Name 02/02/24 1122          Bed Mobility Goal 1 (PT)    Activity/Assistive Device (Bed Mobility Goal 1, PT) sit to supine;supine to sit  -KG     [place] Level/Cues Needed (Bed Mobility Goal 1, PT) independent  -KG     Time Frame (Bed Mobility Goal 1, PT) 2 weeks  -KG     Progress/Outcomes (Bed Mobility Goal 1, PT) goal ongoing  -KG       Row Name 02/02/24 1122          Transfer Goal 1 (PT)    Activity/Assistive Device (Transfer Goal 1, PT) sit-to-stand/stand-to-sit;bed-to-chair/chair-to-bed  -KG     [place] Level/Cues Needed (Transfer Goal 1, PT) independent  -KG     Time Frame (Transfer Goal 1, PT) 2 weeks  -KG     Progress/Outcome (Transfer Goal 1, PT) goal ongoing  -KG       Row Name 02/02/24 1122          Gait Training Goal 1 (PT)    Activity/Assistive Device (Gait Training Goal 1, PT) gait (walking locomotion)  -KG     [place] Level (Gait Training Goal 1, PT) independent  -KG     Distance (Gait Training Goal 1, PT) 400 feet  -KG     Time Frame (Gait Training Goal 1, PT) 2 weeks  -KG     Progress/Outcome (Gait Training Goal 1, PT) goal ongoing  -KG       Row Name 02/02/24 1122          Stairs Goal 1 (PT)    Activity/Assistive Device (Stairs Goal 1, PT) ascending stairs;descending stairs;step-to-step  -KG     [place] Level/Cues Needed (Stairs Goal 1, PT) supervision required  -KG     Number of Stairs (Stairs Goal 1, PT) 2  -KG     Time Frame (Stairs Goal 1, PT) 2 weeks  -KG     Progress/Outcome (Stairs Goal 1, PT) goal ongoing  -KG       Row Name 02/02/24 1122          Therapy Assessment/Plan (PT)    Planned Therapy Interventions (PT) balance training;bed mobility training;gait training;stair training;strengthening;transfer training  -KG               User Key  (r) = Recorded  By, (t) = Taken By, (c) = Cosigned By      Initials Name Provider Type    KG Elizabeth Vázquez, PT Physical Therapist                   Clinical Impression       Row Name 02/02/24 1119          Pain    Pretreatment Pain Rating 2/10  -KG     Posttreatment Pain Rating 2/10  -KG     Pain Location - Side/Orientation Right  -KG     Pain Location - shoulder  -KG     Pain Intervention(s) Repositioned;Ambulation/increased activity  -KG       Row Name 02/02/24 1119          Plan of Care Review    Plan of Care Reviewed With patient  -KG     Outcome Evaluation PT initial evaluation completed for pt s/p R TSA presenting with generalized weakness, impaired balance, decreased safety awareness, and decreased functional mobility. Pt ambulated 450ft with CGA and ascended/descended 2 steps with [place]. Pt's decreased independence warrants PT skilled care. Recommend D/C home with assistance and  PT services.  -KG       Row Name 02/02/24 1119          Therapy Assessment/Plan (PT)    Patient/Family Therapy Goals Statement (PT) return to PLOF  -KG     Rehab Potential (PT) good, to achieve stated therapy goals  -KG     Criteria for Skilled Interventions Met (PT) yes;skilled treatment is necessary  -KG     Therapy Frequency (PT) daily  -KG       Row Name 02/02/24 1119          Vital Signs    Pre Systolic BP Rehab 176  -KG     Pre Treatment Diastolic BP 82  -KG     Pretreatment Heart Rate (beats/min) 93  -KG     Posttreatment Heart Rate (beats/min) 96  -KG     Pre SpO2 (%) 99  -KG     O2 Delivery Pre Treatment room air  -KG     Post SpO2 (%) 98  -KG     O2 Delivery Post Treatment room air  -KG     Pre Patient Position Sitting  -KG     Intra Patient Position Standing  -KG     Post Patient Position Sitting  -KG       Row Name 02/02/24 1119          Positioning and Restraints    Pre-Treatment Position in bed  -KG     Post Treatment Position bed  -KG     In Bed notified nsg;sitting EOB;call light within reach;encouraged to call for  assist;exit alarm on  -KG               User Key  (r) = Recorded By, (t) = Taken By, (c) = Cosigned By      Initials Name Provider Type    Elizabeth Palm PT Physical Therapist                   Outcome Measures       Row Name 02/02/24 1123 02/02/24 0800       How much help from another person do you currently need...    Turning from your back to your side while in flat bed without using bedrails? 4  -KG 4  -HS    Moving from lying on back to sitting on the side of a flat bed without bedrails? 3  -KG 4  -HS    Moving to and from a bed to a chair (including a wheelchair)? 3  -KG 3  -HS    Standing up from a chair using your arms (e.g., wheelchair, bedside chair)? 3  -KG 4  -HS    Climbing 3-5 steps with a railing? 3  -KG 3  -HS    To walk in hospital room? 3  -KG 4  -HS    AM-PAC 6 Clicks Score (PT) 19  -KG 22  -HS    Highest Level of Mobility Goal 6 --> Walk 10 steps or more  -KG 7 --> Walk 25 feet or more  -HS      Row Name 02/02/24 1123          Functional Assessment    Outcome Measure Options AM-PAC 6 Clicks Basic Mobility (PT)  -KG               User Key  (r) = Recorded By, (t) = Taken By, (c) = Cosigned By      Initials Name Provider Type    Elizabeth Palm PT Physical Therapist     Honey Valentin, RN Registered Nurse                                 Physical Therapy Education       Title: PT OT SLP Therapies (In Progress)       Topic: Physical Therapy (In Progress)       Point: Mobility training (Done)       Learning Progress Summary             Patient Acceptance, E, VU,NR by KG at 2/2/2024 0835                         Point: Home exercise program (Not Started)       Learner Progress:  Not documented in this visit.              Point: Body mechanics (Done)       Learning Progress Summary             Patient Acceptance, E, VU,NR by KG at 2/2/2024 0835                         Point: Precautions (Done)       Learning Progress Summary             Patient Acceptance, E, VU,NR by KG at 2/2/2024  0835                                         User Key       Initials Effective Dates Name Provider Type Discipline    KG 05/22/20 -  Elizabeth Vázquez, PT Physical Therapist PT                  PT Recommendation and Plan  Planned Therapy Interventions (PT): balance training, bed mobility training, gait training, stair training, strengthening, transfer training  Plan of Care Reviewed With: patient  Outcome Evaluation: PT initial evaluation completed for pt s/p R TSA presenting with generalized weakness, impaired balance, decreased safety awareness, and decreased functional mobility. Pt ambulated 450ft with CGA and ascended/descended 2 steps with [place]. Pt's decreased independence warrants PT skilled care. Recommend D/C home with assistance and  PT services.     Time Calculation:   PT Evaluation Complexity  History, PT Evaluation Complexity: 1-2 personal factors and/or comorbidities  Examination of Body Systems (PT Eval Complexity): total of 3 or more elements  Clinical Presentation (PT Evaluation Complexity): stable  Clinical Decision Making (PT Evaluation Complexity): low complexity  Overall Complexity (PT Evaluation Complexity): low complexity     PT Charges       Row Name 02/02/24 0835             Time Calculation    Start Time 0835  -KG      PT Received On 02/02/24  -KG      PT Goal Re-Cert Due Date 02/12/24  -KG         Time Calculation- PT    Total Timed Code Minutes- PT 9 minute(s)  -KG         Timed Charges    62961 - Gait Training Minutes  9  -KG         Untimed Charges    PT Eval/Re-eval Minutes 46  -KG         Total Minutes    Timed Charges Total Minutes 9  -KG      Untimed Charges Total Minutes 46  -KG       Total Minutes 55  -KG                User Key  (r) = Recorded By, (t) = Taken By, (c) = Cosigned By      Initials Name Provider Type    KG Elizabeth Vázquez, PT Physical Therapist                  Therapy Charges for Today       Code Description Service Date Service Provider Modifiers Qty     9619578 HC GAIT TRAINING EA 15 MIN 2/2/2024 Elizabeth Vázquez, PT GP 1    [id number] HC PT EVAL LOW COMPLEXITY 4 2/2/2024 Elizabeth Vázquez, PT GP 1            PT G-Codes  Outcome Measure Options: AM-PAC 6 Clicks Basic Mobility (PT)  AM-PAC 6 Clicks Score (PT): 19  AM-PAC 6 Clicks Score (OT): 14  PT Discharge Summary  Anticipated Discharge Disposition (PT): home with assist, home with home health    Lucia Vázquez, PT  2/2/2024

## 2024-02-02 NOTE — THERAPY TREATMENT NOTE
Patient Name: Nolberto Jackson Jr.  : 1955    MRN: [id number]                              Today's Date: 2024       Admit Date: 2024    Visit Dx: No diagnosis found.  Patient Active Problem List   Diagnosis    Glenohumeral arthritis, right    HTN (hypertension)    Hyperlipidemia    Status post total shoulder arthroplasty, right     Past Medical History:   Diagnosis Date    Colon polyp     HX OF    Dialysis patient     NO DILAYSIS SINCE     Fistula     LEFT ARM, DOES NOT WORK    Gout     Hard of hearing     Hearing aid worn     RIGHT EAR    History of hepatitis C     treated 2009    History of kidney cancer 2017    LEFT KIDNEY    History of transfusion     PATIENT DENIES REACTION    Hyperlipidemia     Hypertension     Liver cirrhosis     MRSA infection     D/T COLON CANCER SURGERY    Wears dentures     UPPER AND LOWER     Past Surgical History:   Procedure Laterality Date    COLECTOMY PARTIAL / TOTAL Left     , 2006    COLONOSCOPY      NEPHRECTOMY Left     URETERECTOMY Left 2007    cancer      General Information       Row Name 24 1038          OT Time and Intention    Document Type therapy note (daily note)  -     Mode of Treatment individual therapy;occupational therapy  -       Row Name 24 1038          General Information    Patient Profile Reviewed yes  -KL     Existing Precautions/Restrictions fall;right;non-weight bearing;shoulder;other (see comments)  interscalene nerve catheter, Donjoy Ultra II sling without pillow  -     Barriers to Rehab hearing deficit  -       Row Name 24 1038          Cognition    Orientation Status (Cognition) oriented x 4  -       Row Name 24 1038          Safety Issues, Functional Mobility    Safety Issues Affecting Function (Mobility) awareness of need for assistance;impulsivity;safety precaution awareness;safety precautions follow-through/compliance;problem-solving;judgment  -     Impairments Affecting Function  (Mobility) balance;cognition;coordination;endurance/activity tolerance;pain;postural/trunk control;range of motion (ROM);strength;shortness of breath  -     Cognitive Impairments, Mobility Safety/Performance attention  -               User Key  (r) = Recorded By, (t) = Taken By, (c) = Cosigned By      Initials Name Provider Type    Germaine Hammonds Occupational Therapist                     Mobility/ADL's       Row Name 02/02/24 1041          Sit-Stand Transfer    Sit-Stand Emery (Transfers) contact guard  -     Assistive Device (Sit-Stand Transfers) other (see comments)  [facility]       Row Name 02/02/24 1041          Stand-Sit Transfer    Stand-Sit Emery (Transfers) contact guard  -     Assistive Device (Stand-Sit Transfers) other (see comments)  [facility] Name 02/02/24 1041          Functional Mobility    Functional Mobility- Comment Please refer to PT note for mobility.  -[facility] Name 02/02/24 1041          Activities of Daily Living    BADL Assessment/Intervention upper body dressing;lower body dressing;bathing  -[facility] Name 02/02/24 1041          Mobility    Extremity Weight-bearing Status right upper extremity  -     Right Upper Extremity (Weight-bearing Status) non weight-bearing (NWB)  -[facility] Name 02/02/24 1041          Bathing Assessment/Intervention    Comment, (Bathing) Reinforced pt and brother on R axilla care, as well as avoiding showering until nerve cath has been d/c.  -[facility] Name 02/02/24 1041          Upper Body Dressing Assessment/Training    Emery Level (Upper Body Dressing) don;front opening garment;maximum assist (25% patient effort)  -     Position (Upper Body Dressing) edge of bed sitting  -     Comment, (Upper Body Dressing) Reinforced R shoulder precautions, sling and nerve cath management and dick-dressing. Brother at bedside was able to demo don/doff of sling w/ SBA. Brother will be required to instruct pt's SO as  he will not be his caregiver.  -       Row Name 02/02/24 1041          Lower Body Dressing Assessment/Training    Sargent Level (Lower Body Dressing) don;pants/bottoms;shoes/slippers;socks;maximum assist (25% patient effort)  -     Assistive Devices (Lower Body Dressing) long-handled shoe horn  -     Position (Lower Body Dressing) edge of bed sitting;supported standing  -     Comment, (Lower Body Dressing) Pt provided w/ long-handled shoe horn to assist w/ donning shoes d/t decreased ROM in BUE.  -               User Key  (r) = Recorded By, (t) = Taken By, (c) = Cosigned By      Initials Name Provider Type    Germaine Hammonds Occupational Therapist                   Obj/Interventions       [facility] Name 02/02/24 1048          Sensory Assessment (Somatosensory)    Sensory Assessment Pt reports no numbness or tingling in RUE.  -[facility] Name 02/02/24 1048          Range of Motion Comprehensive    Comment, General Range of Motion LUE shoulder ROM is limited to ~100 degress FE and internal rotation ~50 degrees. R elbow, wrist and hand WFL.  -[facility] Name 02/02/24 1048          Strength Comprehensive (MMT)    Comment, General Manual Muscle Testing (MMT) Assessment RUE MMT not assessed, LUE MMT grossly 4-/5  -[facility] Name 02/02/24 1048          Elbow/Forearm (Therapeutic Exercise)    Elbow/Forearm (Therapeutic Exercise) AROM (active range of motion)  -     Elbow/Forearm AROM (Therapeutic Exercise) right;flexion;extension;supination;pronation;sitting;10 repetitions  -[facility] Name 02/02/24 1048          Wrist (Therapeutic Exercise)    Wrist (Therapeutic Exercise) AROM (active range of motion)  -     Wrist AROM (Therapeutic Exercise) right;flexion;extension;10 repetitions  -[facility] Name 02/02/24 1048          Hand (Therapeutic Exercise)    Hand (Therapeutic Exercise) AROM (active range of motion)  -     Hand AROM/AAROM (Therapeutic Exercise) right;finger flexion;finger extension;10  repetitions  -       Row Name 02/02/24 1048          Balance    Static Sitting Balance independent  -     Dynamic Sitting Balance standby assist  -     Position, Sitting Balance unsupported;sitting edge of bed  -     Static Standing Balance verbal cues;contact guard  -     Dynamic Standing Balance verbal cues;contact guard  -     Position/Device Used, Standing Balance supported  -     Balance Interventions sitting;standing;sit to stand;supported;static;dynamic;occupation based/functional task  -               User Key  (r) = Recorded By, (t) = Taken By, (c) = Cosigned By      Initials Name Provider Type    Germaine Hammonds Occupational Therapist                   Goals/Plan       Row Name 02/02/24 1101          Transfer Goal 1 (OT)    Activity/Assistive Device (Transfer Goal 1, OT) sit-to-stand/stand-to-sit;toilet  -     Ariel Level/Cues Needed (Transfer Goal 1, OT) verbal cues required;minimum assist (75% or more patient effort)  -     Time Frame (Transfer Goal 1, OT) long term goal (LTG);by discharge  -KL     Progress/Outcome (Transfer Goal 1, OT) good progress toward goal  -       Row Name 02/02/24 1101          Dressing Goal 1 (OT)    Activity/Device (Dressing Goal 1, OT) upper body dressing  -KL     Ariel/Cues Needed (Dressing Goal 1, OT) moderate assist (50-74% patient effort);verbal cues required  -KL     Time Frame (Dressing Goal 1, OT) long term goal (LTG);by discharge  -KL     Progress/Outcome (Dressing Goal 1, OT) good progress toward goal  -       Row Name 02/02/24 1101          ROM Goal 1 (OT)    ROM Goal 1 (OT) Pt will complete RUE HEP within physician parameters with min assist  -KL     Time Frame (ROM Goal 1, OT) long term goal (LTG);by discharge  -KL     Progress/Outcome (ROM Goal 1, OT) goal met  -               User Key  (r) = Recorded By, (t) = Taken By, (c) = Cosigned By      Initials Name Provider Type    Germaine Hammonds Occupational Therapist                    Clinical Impression       Row Name 02/02/24 1052          Pain Scale: FACES Pre/Post-Treatment    Pain: FACES Scale, Pretreatment 0-->no hurt  -KL     Posttreatment Pain Rating 0-->no hurt  -KL       Row Name 02/02/24 1052          Plan of Care Review    Plan of Care Reviewed With patient;sibling  -KL     Progress improving  -     Outcome Evaluation Pt presents to OT tx session w/ improved involvement and attention to education. Pt and brother were present for all education and were able to demo good understanding of DonJoy Ultra II sling, nerve cath and exercises.Pt and brother left watching educational video on nerve cath. Elbow, wrist and hand exercises complete AROM w/o assist. d/c plan is home w/ 24/7 assist and HHOT to ensure SO is able to carry out proper antonio/doff of sling and exercises. Pt also does not have transportion <> OP therapy so may need to continue w/ HH.  -       Row Name 02/02/24 1052          Therapy Plan Review/Discharge Plan (OT)    Anticipated Discharge Disposition (OT) home with 24/7 care;home with home health  -       Row Name 02/02/24 1052          Vital Signs    Pre Patient Position Sitting  -KL     Intra Patient Position Standing  -KL     Post Patient Position Sitting  -[facility] Name 02/02/24 1052          Positioning and Restraints    Pre-Treatment Position in bed  -KL     Post Treatment Position bed  -KL     In Bed notified nsg;sitting EOB;with family/caregiver;exit alarm on;encouraged to call for assist;call light within reach  -KL               User Key  (r) = Recorded By, (t) = Taken By, (c) = Cosigned By      Initials Name Provider Type    Germaine Hammonds Occupational Therapist                   Outcome Measures       Row Name 02/02/24 1102          How much help from another is currently needed...    Putting on and taking off regular lower body clothing? 2  -KL     Bathing (including washing, rinsing, and drying) 2  -KL     Toileting (which includes using toilet bed  pan or urinal) 2  -KL     Putting on and taking off regular upper body clothing 2  -KL     Taking care of personal grooming (such as brushing teeth) 3  -KL     Eating meals 3  -KL     AM-PAC 6 Clicks Score (OT) 14  -KL       Row Name 02/02/24 0800          How much help from another person do you currently need...    Turning from your back to your side while in flat bed without using bedrails? 4  -HS     Moving from lying on back to sitting on the side of a flat bed without bedrails? 4  -HS     Moving to and from a bed to a chair (including a wheelchair)? 3  -HS     Standing up from a chair using your arms (e.g., wheelchair, bedside chair)? 4  -HS     Climbing 3-5 steps with a railing? 3  -HS     To walk in hospital room? 4  -HS     AM-PAC 6 Clicks Score (PT) 22  -HS     Highest Level of Mobility Goal 7 --> Walk 25 feet or more  -HS               User Key  (r) = Recorded By, (t) = Taken By, (c) = Cosigned By      Initials Name Provider Type    Germaine Hammonds Occupational Therapist    HS Honey Valentin, RN Registered Nurse                    Occupational Therapy Education       Title: PT OT SLP Therapies (Done)       Topic: Occupational Therapy (Done)       Point: ADL training (Done)       Description:   Instruct learner(s) on proper safety adaptation and remediation techniques during self care or transfers.   Instruct in proper use of assistive devices.                  Learning Progress Summary             Patient Acceptance, E,TB,D,H, VU,DU by ROVERTO at 2/2/2024 1102    Acceptance, E,D,H, NL by AR at 2/1/2024 1522   Family Acceptance, E,TB,D,H, VU,DU by  at 2/2/2024 1102                         Point: Home exercise program (Done)       Description:   Instruct learner(s) on appropriate technique for monitoring, assisting and/or progressing therapeutic exercises/activities.                  Learning Progress Summary             Patient Acceptance, E,TB,D,H, VU,DU by ROVERTO at 2/2/2024 1102    Acceptance, E,D,H, NL by AR  at 2/1/2024 1522   Family Acceptance, E,TB,D,H, VU,DU by  at 2/2/2024 1102                         Point: Precautions (Done)       Description:   Instruct learner(s) on prescribed precautions during self-care and functional transfers.                  Learning Progress Summary             Patient Acceptance, E,TB,D,H, VU,DU by  at 2/2/2024 1102    Acceptance, E,D,H, NL by AR at 2/1/2024 1522   Family Acceptance, E,TB,D,H, VU,DU by  at 2/2/2024 1102                         Point: Body mechanics (Done)       Description:   Instruct learner(s) on proper positioning and spine alignment during self-care, functional mobility activities and/or exercises.                  Learning Progress Summary             Patient Acceptance, E,TB,D,H, VU,DU by  at 2/2/2024 1102    Acceptance, E,D,H, NL by AR at 2/1/2024 1522   Family Acceptance, E,TB,D,H, VU,DU by  at 2/2/2024 1102                                         User Key       Initials Effective Dates Name Provider Type Discipline    AR 07/11/23 -  Dania Boone, OT Occupational Therapist OT     11/20/23 -  Germaine Mackenzie Occupational Therapist                   OT Recommendation and Plan     Plan of Care Review  Plan of Care Reviewed With: patient, sibling  Progress: improving  Outcome Evaluation: Pt presents to OT tx session w/ improved involvement and attention to education. Pt and brother were present for all education and were able to demo good understanding of DonJoy Ultra II sling, nerve cath and exercises.Pt and brother left watching educational video on nerve cath. Elbow, wrist and hand exercises complete AROM w/o assist. d/c plan is home w/ 24/7 assist and HHOT to ensure SO is able to carry out proper antonio/doff of sling and exercises. Pt also does not have transportion <> OP therapy so may need to continue w/ HH.     Time Calculation:         Time Calculation- OT       Row Name 02/02/24 1104             Time Calculation- OT    OT Start Time 0925  -ROVERTO       OT Received On 02/02/24  -         Timed Charges    19287 - OT Therapeutic Exercise Minutes 10  -      99367 - OT Therapeutic Activity Minutes 13  -      78750 - OT Self Care/Mgmt Minutes 15  -         Total Minutes    Timed Charges Total Minutes 38  -       Total Minutes 38  -                User Key  (r) = Recorded By, (t) = Taken By, (c) = Cosigned By      Initials Name Provider Type    Germaine Hammonds Occupational Therapist                  Therapy Charges for Today       Code Description Service Date Service Provider Modifiers Qty    55304259769  OT THER PROC EA 15 MIN 2/2/2024 Germaine Mackenzie    [id number]  OT THERAPEUTIC ACT EA 15 MIN 2/2/2024 Germaine Mackenzie    [id number]  OT SELF CARE/MGMT/TRAIN EA 15 MIN 2/2/2024 Germaine Mackenzie  2/2/2024

## 2024-02-02 NOTE — PLAN OF CARE
Goal Outcome Evaluation:  Plan of Care Reviewed With: patient, sibling        Progress: improving  Outcome Evaluation: Pt presents to OT tx session w/ improved involvement and attention to education. Pt and brother were present for all education and were able to demo good understanding of DonJoy Ultra II sling, nerve cath and exercises.Pt and brother left watching educational video on nerve cath. Elbow, wrist and hand exercises complete AROM w/o assist. d/c plan is home w/ 24/7 assist and HHOT to ensure SO is able to carry out proper antonio/doff of sling and exercises. Pt also does not have transportion <> OP therapy so may need to continue w/ HH.      Anticipated Discharge Disposition (OT): home with 24/7 care, home with home health

## 2024-02-02 NOTE — PLAN OF CARE
Goal Outcome Evaluation:  Plan of Care Reviewed With: patient        Progress: no change  Outcome Evaluation: No complaints of pain this shift. Patient ambulated several times and had good output. No complaints at this time.

## 2024-02-02 NOTE — DISCHARGE SUMMARY
Patient Name: Nolberto Jackson Jr.  MRN: [id number]  : 1955  DOS: 2024    Attending: Jhon Fang MD    Primary Care Provider: Tammy Myers MD    Date of Admission:.2024  6:58 AM    Date of Discharge:  2024    Discharge Diagnosis:   Status post total shoulder arthroplasty, right    Glenohumeral arthritis, right    HTN (hypertension)    Hyperlipidemia      Hospital Course    At admit:  Patient is a pleasant 68 y.o. male presented for scheduled surgery by Dr. Fang.     His shoulder has been painful with limited ROM for about 8 years. He denies numbness, tingling or difficulty with  right hand. Conservative treatments failed to provide lasting benefits.      Patient underwent right total shoulder arthroplasty and left shoulder injection of corticosteroid.  Surgery was done under GA and a block, he tolerated surgery well, was admitted for further management.     I saw him in PACU where he is doing fairly well.  No complaints of nausea, vomiting, or shortness breath.     Reviewed with patient his past medical history and home medications.     Patient was seen earlier by Occupational Therapy, did not pass mobility screen, had decreased alertness at the time.  I saw him after a few hours, he is back to his baseline.  Did require in and out catheterization.    After admit:  Patient was provided pain medications as needed for pain control, along with interscalene nerve block infusion of Ropivacaine    Adjustments were made to pain medications to optimize postop pain management.   Risks and benefits of opiate medications discussed with patient. TRU report on chart was reviewed.    The patient was seen by OT and was taught exercises for left arm.  The patient used an IS for atelectasis prophylaxis and mechanicals for DVT prophylaxis.    Home medications were resumed as appropriate, and labs were monitored.  Creatinine was stable/improved from preop.  Potassium was mildly  elevated, he received a dose of Lokelma.  I discussed with him low potassium diet which he was well versed with.  He has an appointment with his nephrologist in about a week.    With the progress he has made, Mr. Jackson is ready for DC home today.      The patient will have an Infupump ( instructed on it during this admit)  Discussed with patient regarding plan and he shows understanding and agreement.        Procedures Performed  Procedure(s):  TOTAL SHOULDER ARTHROPLASTY - RIGHT LEFT SHOULDER STEROID INJECTION       Pertinent Test Results:    I reviewed the patient's new clinical results.   Results from last 7 days   Lab Units 02/02/24  0451 02/01/24  0805   WBC 10*3/mm3 9.12  --    HEMOGLOBIN g/dL 11.9*  --    HEMOGLOBIN, POC g/dL  --  12.6   HEMATOCRIT % 36.1*  --    HEMATOCRIT POC %  --  37*   PLATELETS 10*3/mm3 138*  --      Results from last 7 days   Lab Units 02/02/24  0451   SODIUM mmol/L 137   POTASSIUM mmol/L 5.3*   CHLORIDE mmol/L 107   CO2 mmol/L 15.0*   BUN mg/dL 41*   CREATININE mg/dL 2.59*   CALCIUM mg/dL 8.6   GLUCOSE mg/dL 115*     I reviewed the patient's new imaging including images and reports.      Occupational Therapy  Germaine Mackenzie   Occupational Therapist  Specialty: Occupational Therapy     Plan of Care      Signed     Date of Service: 02/02/24 0925  Creation Time: 02/02/24 1102     Signed         Goal Outcome Evaluation:  Plan of Care Reviewed With: patient, sibling  Progress: improving  Outcome Evaluation: Pt presents to OT tx session w/ improved involvement and attention to education. Pt and brother were present for all education and were able to demo good understanding of DonJoy Ultra II sling, nerve cath and exercises.Pt and brother left watching educational video on nerve cath. Elbow, wrist and hand exercises complete AROM w/o assist. d/c plan is home w/ 24/7 assist and HHOT to ensure SO is able to carry out proper antonio/doff of sling and exercises. Pt also does not have transportion <>  "OP therapy so may need to continue w/ HH.        Anticipated Discharge Disposition (OT): home with 24/ care, home with home health                      Physical therapy  Elizabeth Vázquez, PT   Physical Therapist  Physical Therapy     Plan of Care      Signed     Date of Service: 24  Creation Time: 24     Signed         Goal Outcome Evaluation:  Plan of Care Reviewed With: patient  Outcome Evaluation: PT initial evaluation completed for pt s/p R TSA presenting with generalized weakness, impaired balance, decreased safety awareness, and decreased functional mobility. Pt ambulated 450ft with CGA and ascended/descended 2 steps with Fatemeh. Pt's decreased independence warrants PT skilled care. Recommend D/C home with assistance and HH PT services.        Anticipated Discharge Disposition (PT): home with assist, home with home health                      Discharge Assessment:       Visit Vitals  /90 (BP Location: Left leg, Patient Position: Lying)   Pulse 96   Temp 97.8 °F (36.6 °C) (Oral)   Resp 18   Ht 180.3 cm (71\")   Wt 91.2 kg (201 lb)   SpO2 99%   BMI 28.03 kg/m²     Temp (24hrs), Av.1 °F (36.7 °C), Min:97.8 °F (36.6 °C), Max:98.5 °F (36.9 °C)      General Appearance:    Alert, cooperative, in no acute distress   Lungs:     Clear to auscultation,respirations regular, even and   unlabored    Heart:    Regular rhythm and normal rate, normal S1 and S2    Abdomen:     Normal bowel sounds, no masses, no organomegaly, soft        non-tender, non-distended, no guarding, no rebound                 tenderness   Extremities: Right UE in a sling, CDI  dressing over right shoulder incision . Interscalene nerve block cath present.  Distal pulses, cap refill,  intact. Movement and sensation intact distally     Pulses:   Pulses palpable and equal bilaterally   Skin:   No bleeding, bruising or rash        Discharge Disposition: Home          Discharge Medications        New Medications        " Instructions Start Date   ondansetron 4 MG tablet  Commonly known as: Zofran   4 mg, Oral, Every 8 Hours PRN      oxyCODONE 5 MG immediate release tablet  Commonly known as: ROXICODONE   5 mg, Oral, Every 4 Hours PRN      ropivacaine 0.2 % infusion (INFUSYSTEM)  Commonly known as: NAROPIN   1 mL/hr (2 mg/hr), Peripheral Nerve, Continuous             Continue These Medications        Instructions Start Date   allopurinol 100 MG tablet  Commonly known as: ZYLOPRIM   1 tablet, Oral, Daily      amLODIPine 10 MG tablet  Commonly known as: NORVASC   1 tablet, Oral, Daily      carvedilol 3.125 MG tablet  Commonly known as: COREG   1 tablet, Oral, 2 Times Daily      pravastatin 40 MG tablet  Commonly known as: PRAVACHOL   1 tablet, Oral, Daily             Stop These Medications      benzoyl peroxide 5 % external wash  Generic drug: benzoyl peroxide              Discharge Diet: Low potassium diet       Activity Instructions       Non-Weight Bearing - Specify Restrictions      1) sling at all times except for bathing dressing and changing only-- when out of sling for these activities please keep your arm by your side (elbow near side with hand near the abdomen-- similar to sling position)  2) bandage should be left in place until followup  3) you may shower starting 72 hours after surgery-- until then please keep bandages in place and dry  4) for showering, please leave the tan aquacel bandage in place and place a layer of saran wrap over the bandage, remove the saran wrap after showeringpat tan bandage dry and get back in sling  5) OT should see patient in PACU to instruct for elbow wrist and hand ROM prior to discharge    Patient May Shower; No Tub Baths, Pools or Hot Tubs      1) sling at all times except for bathing dressing and changing only-- when out of sling for these activities please keep your arm by your side (elbow near side with hand near the abdomen-- similar to sling position)  2) bandage should be left in  place until followup  3) you may shower starting 72 hours after surgery-- until then please keep bandages in place and dry  4) for showering, please leave the tan aquacel bandage in place and place a layer of saran wrap over the bandage, remove the saran wrap after showeringpat tan bandage dry and get back in sling  5) OT should see patient in PACU to instruct for elbow wrist and hand ROM prior to discharge               Follow-up Appointments:  Jhon Fang MD per his orders  Primary care provider and nephrology as previously scheduled         Pito Nassar MD  02/02/24  13:05 EST

## 2024-02-04 ENCOUNTER — HOSPITAL ENCOUNTER (EMERGENCY)
Facility: HOSPITAL | Age: 69
Discharge: HOME | End: 2024-02-04
Attending: EMERGENCY MEDICINE | Admitting: EMERGENCY MEDICINE
Payer: MEDICARE

## 2024-02-04 VITALS
HEIGHT: 71 IN | BODY MASS INDEX: 28 KG/M2 | DIASTOLIC BLOOD PRESSURE: 105 MMHG | RESPIRATION RATE: 18 BRPM | SYSTOLIC BLOOD PRESSURE: 172 MMHG | TEMPERATURE: 98.1 F | OXYGEN SATURATION: 96 % | WEIGHT: 200 LBS | HEART RATE: 96 BPM

## 2024-02-04 DIAGNOSIS — Z45.1 ENCOUNTER FOR INTERROGATION OF INFUSION PUMP: Primary | ICD-10-CM

## 2024-02-04 PROCEDURE — 99283 EMERGENCY DEPT VISIT LOW MDM: CPT

## 2024-02-04 RX ORDER — CLONIDINE HYDROCHLORIDE 0.1 MG/1
0.1 TABLET ORAL ONCE
Status: COMPLETED | OUTPATIENT
Start: 2024-02-04 | End: 2024-02-04

## 2024-02-04 RX ORDER — ROPIVACAINE HYDROCHLORIDE 2 MG/ML
INJECTION, SOLUTION EPIDURAL; INFILTRATION; PERINEURAL CONTINUOUS
Status: DISCONTINUED | OUTPATIENT
Start: 2024-02-04 | End: 2024-02-04

## 2024-02-04 RX ADMIN — CLONIDINE HYDROCHLORIDE 0.1 MG: 0.1 TABLET ORAL at 22:45
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
## 2024-03-12 ENCOUNTER — TRANSCRIBE ORDERS (OUTPATIENT)
Dept: ADMINISTRATIVE | Facility: HOSPITAL | Age: 69
End: 2024-03-12
Payer: MEDICARE

## 2024-03-12 DIAGNOSIS — K74.02 STAGE 3 HEPATIC FIBROSIS: Primary | ICD-10-CM

## 2024-05-15 ENCOUNTER — HOSPITAL ENCOUNTER (OUTPATIENT)
Dept: ULTRASOUND IMAGING | Facility: HOSPITAL | Age: 69
Discharge: HOME | End: 2024-05-15
Admitting: INTERNAL MEDICINE
Payer: MEDICARE

## 2024-05-15 DIAGNOSIS — K74.02 STAGE 3 HEPATIC FIBROSIS: ICD-10-CM

## 2024-05-15 PROCEDURE — 76705 ECHO EXAM OF ABDOMEN: CPT

## 2024-08-22 ENCOUNTER — PRE-ADMISSION TESTING (OUTPATIENT)
Dept: PREADMISSION TESTING | Facility: HOSPITAL | Age: 69
End: 2024-08-22
Payer: MEDICARE

## 2024-08-22 VITALS — BODY MASS INDEX: 27.36 KG/M2 | HEIGHT: 71 IN | WEIGHT: 195.44 LBS

## 2024-08-22 LAB
ALBUMIN SERPL-MCNC: 4.6 G/DL (ref 3.5–5.2)
ALBUMIN/GLOB SERPL: 1.4 G/DL
ALP SERPL-CCNC: 106 U/L (ref 39–117)
ALT SERPL W P-5'-P-CCNC: 13 U/L (ref 1–41)
ANION GAP SERPL CALCULATED.3IONS-SCNC: 14 MMOL/L (ref 5–15)
APTT PPP: 35.4 SECONDS (ref 22–39)
AST SERPL-CCNC: 20 U/L (ref 1–40)
BASOPHILS # BLD AUTO: 0.05 10*3/MM3 (ref 0–0.2)
BASOPHILS NFR BLD AUTO: 0.7 % (ref 0–1.5)
BILIRUB SERPL-MCNC: 0.4 MG/DL (ref 0–1.2)
BUN SERPL-MCNC: 41 MG/DL (ref 8–23)
BUN/CREAT SERPL: 12.4 (ref 7–25)
CALCIUM SPEC-SCNC: 9.4 MG/DL (ref 8.6–10.5)
CHLORIDE SERPL-SCNC: 105 MMOL/L (ref 98–107)
CO2 SERPL-SCNC: 20 MMOL/L (ref 22–29)
CREAT SERPL-MCNC: 3.31 MG/DL (ref 0.76–1.27)
DEPRECATED RDW RBC AUTO: 42.9 FL (ref 37–54)
EGFRCR SERPLBLD CKD-EPI 2021: 19.6 ML/MIN/1.73
EOSINOPHIL # BLD AUTO: 0.12 10*3/MM3 (ref 0–0.4)
EOSINOPHIL NFR BLD AUTO: 1.8 % (ref 0.3–6.2)
ERYTHROCYTE [DISTWIDTH] IN BLOOD BY AUTOMATED COUNT: 13 % (ref 12.3–15.4)
GLOBULIN UR ELPH-MCNC: 3.3 GM/DL
GLUCOSE SERPL-MCNC: 94 MG/DL (ref 65–99)
HBA1C MFR BLD: 5 % (ref 4.8–5.6)
HCT VFR BLD AUTO: 41.9 % (ref 37.5–51)
HGB BLD-MCNC: 13.6 G/DL (ref 13–17.7)
IMM GRANULOCYTES # BLD AUTO: 0.03 10*3/MM3 (ref 0–0.05)
IMM GRANULOCYTES NFR BLD AUTO: 0.4 % (ref 0–0.5)
INR PPP: 1.11 (ref 0.89–1.12)
LYMPHOCYTES # BLD AUTO: 1.17 10*3/MM3 (ref 0.7–3.1)
LYMPHOCYTES NFR BLD AUTO: 17.5 % (ref 19.6–45.3)
MCH RBC QN AUTO: 29.7 PG (ref 26.6–33)
MCHC RBC AUTO-ENTMCNC: 32.5 G/DL (ref 31.5–35.7)
MCV RBC AUTO: 91.5 FL (ref 79–97)
MONOCYTES # BLD AUTO: 0.55 10*3/MM3 (ref 0.1–0.9)
MONOCYTES NFR BLD AUTO: 8.2 % (ref 5–12)
NEUTROPHILS NFR BLD AUTO: 4.77 10*3/MM3 (ref 1.7–7)
NEUTROPHILS NFR BLD AUTO: 71.4 % (ref 42.7–76)
NRBC BLD AUTO-RTO: 0 /100 WBC (ref 0–0.2)
PLATELET # BLD AUTO: 141 10*3/MM3 (ref 140–450)
PMV BLD AUTO: 9.6 FL (ref 6–12)
POTASSIUM SERPL-SCNC: 5.2 MMOL/L (ref 3.5–5.2)
PROT SERPL-MCNC: 7.9 G/DL (ref 6–8.5)
PROTHROMBIN TIME: 14.4 SECONDS (ref 12.2–14.5)
QT INTERVAL: 380 MS
QTC INTERVAL: 388 MS
RBC # BLD AUTO: 4.58 10*6/MM3 (ref 4.14–5.8)
SODIUM SERPL-SCNC: 139 MMOL/L (ref 136–145)
WBC NRBC COR # BLD AUTO: 6.69 10*3/MM3 (ref 3.4–10.8)

## 2024-08-22 PROCEDURE — 80053 COMPREHEN METABOLIC PANEL: CPT

## 2024-08-22 PROCEDURE — 93005 ELECTROCARDIOGRAM TRACING: CPT

## 2024-08-22 PROCEDURE — 83036 HEMOGLOBIN GLYCOSYLATED A1C: CPT

## 2024-08-22 PROCEDURE — 85610 PROTHROMBIN TIME: CPT

## 2024-08-22 PROCEDURE — 36415 COLL VENOUS BLD VENIPUNCTURE: CPT

## 2024-08-22 PROCEDURE — 85730 THROMBOPLASTIN TIME PARTIAL: CPT

## 2024-08-22 PROCEDURE — 85025 COMPLETE CBC W/AUTO DIFF WBC: CPT

## 2024-08-22 NOTE — PAT
An arrival time for procedure was not provided during PAT visit. If patient had any questions or concerns about their arrival time, they were instructed to contact their surgeon/physician.  Additionally, if the patient referred to an arrival time that was acquired from their my chart account, patient was encouraged to verify that time with their surgeon/physician. Arrival times are NOT provided in Pre Admission Testing Department.    Patient instructed to drink 20 ounces of Gatorade or Gatorlyte (if diabetic) and it needs to be completed 1 hour (for Main OR patients) or 2 hours (scheduled  section & [facility] patients) before given arrival time for procedure (NO RED Gatorade and NO Gatorade Zero).    Patient verbalized understanding.    PATIENT RECEIVED INSTRUCTIONS ON HOW TO AND WHEN TO USE BENZOYL WASH. PATIENT VERBALIZED UNDERSTANDING.     Post-Surgery Information Instruction Sheet given to patient during Pre-Admission Testing Visit with verbal instructions to patient to return with PAT PASS on the day of surgery. Additionally, encouraged patient to review the information provided.    [employer] (by [employer]) pain pump patient informational handout given to patient.  Instructed patient to watch [employer] Patient Education Video regarding Peripheral Nerve Catheter that will be in place for upcoming surgery unless contraindicated. The video can be accessed using QR code noted on handout.  Patient agreed to watch video.  Stressed to patient to call [employer] Nursing Hotline  if patient has any questions or concerns after discharge.     Patient denies any current skin issues.     PATIENT EKG ON CHART AND IN EPIC FROM 2024

## 2024-08-28 ENCOUNTER — ANESTHESIA EVENT (OUTPATIENT)
Dept: PERIOP | Facility: HOSPITAL | Age: 69
End: 2024-08-28
Payer: MEDICARE

## 2024-08-28 RX ORDER — SODIUM CHLORIDE 0.9 % (FLUSH) 0.9 %
10 SYRINGE (ML) INJECTION EVERY 12 HOURS SCHEDULED
Status: CANCELLED | OUTPATIENT
Start: 2024-08-28

## 2024-08-28 RX ORDER — SODIUM CHLORIDE 0.9 % (FLUSH) 0.9 %
10 SYRINGE (ML) INJECTION AS NEEDED
Status: CANCELLED | OUTPATIENT
Start: 2024-08-28

## 2024-08-29 ENCOUNTER — APPOINTMENT (OUTPATIENT)
Dept: GENERAL RADIOLOGY | Facility: HOSPITAL | Age: 69
End: 2024-08-29
Payer: MEDICARE

## 2024-08-29 ENCOUNTER — HOSPITAL ENCOUNTER (OUTPATIENT)
Facility: HOSPITAL | Age: 69
Setting detail: HOSPITAL OUTPATIENT SURGERY
Discharge: HOME | End: 2024-08-29
Attending: ORTHOPAEDIC SURGERY | Admitting: ORTHOPAEDIC SURGERY
Payer: MEDICARE

## 2024-08-29 ENCOUNTER — ANESTHESIA (OUTPATIENT)
Dept: PERIOP | Facility: HOSPITAL | Age: 69
End: 2024-08-29
Payer: MEDICARE

## 2024-08-29 ENCOUNTER — ANESTHESIA EVENT CONVERTED (OUTPATIENT)
Dept: ANESTHESIOLOGY | Facility: HOSPITAL | Age: 69
End: 2024-08-29
Payer: MEDICARE

## 2024-08-29 VITALS
OXYGEN SATURATION: 98 % | WEIGHT: 190 LBS | DIASTOLIC BLOOD PRESSURE: 75 MMHG | RESPIRATION RATE: 18 BRPM | BODY MASS INDEX: 26.6 KG/M2 | HEIGHT: 71 IN | SYSTOLIC BLOOD PRESSURE: 115 MMHG | TEMPERATURE: 97.7 F | HEART RATE: 55 BPM

## 2024-08-29 PROCEDURE — 25010000002 GLYCOPYRROLATE 1 MG/5ML SOLUTION: Performed by: ANESTHESIOLOGY

## 2024-08-29 PROCEDURE — C1713 ANCHOR/SCREW BN/BN,TIS/BN: HCPCS | Performed by: ORTHOPAEDIC SURGERY

## 2024-08-29 PROCEDURE — 25010000002 PHENYLEPHRINE 10 MG/ML SOLUTION 1 ML VIAL

## 2024-08-29 PROCEDURE — 25010000002 DEXAMETHASONE PER 1 MG

## 2024-08-29 PROCEDURE — 25010000002 ONDANSETRON PER 1 MG: Performed by: ANESTHESIOLOGY

## 2024-08-29 PROCEDURE — C1776 JOINT DEVICE (IMPLANTABLE): HCPCS | Performed by: ORTHOPAEDIC SURGERY

## 2024-08-29 PROCEDURE — 25010000002 SUGAMMADEX 200 MG/2ML SOLUTION: Performed by: NURSE ANESTHETIST, CERTIFIED REGISTERED

## 2024-08-29 PROCEDURE — 97551 CAREGIVER TRAING EA ADDL 15: CPT | Performed by: OCCUPATIONAL THERAPIST

## 2024-08-29 PROCEDURE — 25010000002 VANCOMYCIN 1 G RECONSTITUTED SOLUTION: Performed by: ORTHOPAEDIC SURGERY

## 2024-08-29 PROCEDURE — 97535 SELF CARE MNGMENT TRAINING: CPT | Performed by: OCCUPATIONAL THERAPIST

## 2024-08-29 PROCEDURE — 25010000002 PROPOFOL 10 MG/ML EMULSION

## 2024-08-29 PROCEDURE — 97530 THERAPEUTIC ACTIVITIES: CPT | Performed by: OCCUPATIONAL THERAPIST

## 2024-08-29 PROCEDURE — 97161 PT EVAL LOW COMPLEX 20 MIN: CPT

## 2024-08-29 PROCEDURE — 25010000002 FENTANYL CITRATE (PF) 50 MCG/ML SOLUTION: Performed by: NURSE ANESTHETIST, CERTIFIED REGISTERED

## 2024-08-29 PROCEDURE — 97165 OT EVAL LOW COMPLEX 30 MIN: CPT | Performed by: OCCUPATIONAL THERAPIST

## 2024-08-29 PROCEDURE — 25810000003 LACTATED RINGERS PER 1000 ML: Performed by: ANESTHESIOLOGY

## 2024-08-29 PROCEDURE — 25010000002 ROPIVACAINE HCL-NACL 0.2-0.9 % SOLUTION: Performed by: NURSE ANESTHETIST, CERTIFIED REGISTERED

## 2024-08-29 PROCEDURE — 25010000002 FENTANYL CITRATE (PF) 50 MCG/ML SOLUTION

## 2024-08-29 PROCEDURE — 25010000002 BUPIVACAINE (PF) 0.25 % SOLUTION: Performed by: NURSE ANESTHETIST, CERTIFIED REGISTERED

## 2024-08-29 PROCEDURE — 25010000002 CEFAZOLIN PER 500 MG: Performed by: ORTHOPAEDIC SURGERY

## 2024-08-29 PROCEDURE — 73020 X-RAY EXAM OF SHOULDER: CPT

## 2024-08-29 PROCEDURE — 97550 CAREGIVER TRAING 1ST 30 MIN: CPT | Performed by: OCCUPATIONAL THERAPIST

## 2024-08-29 PROCEDURE — 97110 THERAPEUTIC EXERCISES: CPT | Performed by: OCCUPATIONAL THERAPIST

## 2024-08-29 DEVICE — CP TOTL REV SHLDR PERFORM STEM RSA PERF REV LAT/AUG: Type: IMPLANTABLE DEVICE | Site: SHOULDER | Status: FUNCTIONAL

## 2024-08-29 DEVICE — SCRW AEQUALIS PERFORM PERIPH 5X30MM: Type: IMPLANTABLE DEVICE | Site: SHOULDER | Status: FUNCTIONAL

## 2024-08-29 DEVICE — GLENOSPHERE SHLDR AEQUALIS/PERFORM REV STD 42MM: Type: IMPLANTABLE DEVICE | Site: SHOULDER | Status: FUNCTIONAL

## 2024-08-29 DEVICE — GUIDEPIN HUM PREFORM 3X100MM: Type: IMPLANTABLE DEVICE | Site: SHOULDER | Status: FUNCTIONAL

## 2024-08-29 DEVICE — IMPLANTABLE DEVICE: Type: IMPLANTABLE DEVICE | Site: SHOULDER | Status: FUNCTIONAL

## 2024-08-29 DEVICE — ABSORBABLE HEMOSTAT (OXIDIZED REGENERATED CELLULOSE)
Type: IMPLANTABLE DEVICE | Site: SHOULDER | Status: FUNCTIONAL
Brand: SURGICEL

## 2024-08-29 DEVICE — SCRW PERIPH 5X34MM: Type: IMPLANTABLE DEVICE | Site: SHOULDER | Status: FUNCTIONAL

## 2024-08-29 DEVICE — SCRW AEQUALIS PERFORM PERIPH 5X26MM: Type: IMPLANTABLE DEVICE | Site: SHOULDER | Status: FUNCTIONAL

## 2024-08-29 DEVICE — POST SHDLR AEQUALIS PERFORM REV PRSS/FIT SHT 7MM: Type: IMPLANTABLE DEVICE | Site: SHOULDER | Status: FUNCTIONAL

## 2024-08-29 RX ORDER — FENTANYL CITRATE 50 UG/ML
50 INJECTION, SOLUTION INTRAMUSCULAR; INTRAVENOUS
Status: DISCONTINUED | OUTPATIENT
Start: 2024-08-29 | End: 2024-08-29 | Stop reason: HOSPADM

## 2024-08-29 RX ORDER — FAMOTIDINE 10 MG/ML
20 INJECTION, SOLUTION INTRAVENOUS ONCE
Status: DISCONTINUED | OUTPATIENT
Start: 2024-08-29 | End: 2024-08-29 | Stop reason: HOSPADM

## 2024-08-29 RX ORDER — SODIUM CHLORIDE, SODIUM LACTATE, POTASSIUM CHLORIDE, CALCIUM CHLORIDE 600; 310; 30; 20 MG/100ML; MG/100ML; MG/100ML; MG/100ML
9 INJECTION, SOLUTION INTRAVENOUS CONTINUOUS
Status: DISCONTINUED | OUTPATIENT
Start: 2024-08-29 | End: 2024-08-29 | Stop reason: HOSPADM

## 2024-08-29 RX ORDER — PREGABALIN 75 MG/1
75 CAPSULE ORAL ONCE
Status: COMPLETED | OUTPATIENT
Start: 2024-08-29 | End: 2024-08-29

## 2024-08-29 RX ORDER — ROCURONIUM BROMIDE 10 MG/ML
INJECTION, SOLUTION INTRAVENOUS AS NEEDED
Status: DISCONTINUED | OUTPATIENT
Start: 2024-08-29 | End: 2024-08-29 | Stop reason: SURG

## 2024-08-29 RX ORDER — LIDOCAINE HYDROCHLORIDE 10 MG/ML
INJECTION, SOLUTION EPIDURAL; INFILTRATION; INTRACAUDAL; PERINEURAL AS NEEDED
Status: DISCONTINUED | OUTPATIENT
Start: 2024-08-29 | End: 2024-08-29 | Stop reason: SURG

## 2024-08-29 RX ORDER — FAMOTIDINE 20 MG/1
20 TABLET, FILM COATED ORAL ONCE
Status: COMPLETED | OUTPATIENT
Start: 2024-08-29 | End: 2024-08-29

## 2024-08-29 RX ORDER — TRANEXAMIC ACID 10 MG/ML
1000 INJECTION, SOLUTION INTRAVENOUS ONCE
Status: COMPLETED | OUTPATIENT
Start: 2024-08-29 | End: 2024-08-29

## 2024-08-29 RX ORDER — LIDOCAINE HYDROCHLORIDE 10 MG/ML
0.5 INJECTION, SOLUTION EPIDURAL; INFILTRATION; INTRACAUDAL; PERINEURAL ONCE AS NEEDED
Status: COMPLETED | OUTPATIENT
Start: 2024-08-29 | End: 2024-08-29

## 2024-08-29 RX ORDER — BUPIVACAINE HYDROCHLORIDE 2.5 MG/ML
INJECTION, SOLUTION EPIDURAL; INFILTRATION; INTRACAUDAL
Status: COMPLETED | OUTPATIENT
Start: 2024-08-29 | End: 2024-08-29

## 2024-08-29 RX ORDER — HYDROMORPHONE HYDROCHLORIDE 1 MG/ML
0.5 INJECTION, SOLUTION INTRAMUSCULAR; INTRAVENOUS; SUBCUTANEOUS
Status: DISCONTINUED | OUTPATIENT
Start: 2024-08-29 | End: 2024-08-29 | Stop reason: HOSPADM

## 2024-08-29 RX ORDER — PROPOFOL 10 MG/ML
VIAL (ML) INTRAVENOUS AS NEEDED
Status: DISCONTINUED | OUTPATIENT
Start: 2024-08-29 | End: 2024-08-29 | Stop reason: SURG

## 2024-08-29 RX ORDER — DROPERIDOL 2.5 MG/ML
0.62 INJECTION, SOLUTION INTRAMUSCULAR; INTRAVENOUS ONCE AS NEEDED
Status: DISCONTINUED | OUTPATIENT
Start: 2024-08-29 | End: 2024-08-29 | Stop reason: HOSPADM

## 2024-08-29 RX ORDER — ACETAMINOPHEN 500 MG
1000 TABLET ORAL ONCE
Status: COMPLETED | OUTPATIENT
Start: 2024-08-29 | End: 2024-08-29

## 2024-08-29 RX ORDER — DEXAMETHASONE SODIUM PHOSPHATE 4 MG/ML
INJECTION, SOLUTION INTRA-ARTICULAR; INTRALESIONAL; INTRAMUSCULAR; INTRAVENOUS; SOFT TISSUE AS NEEDED
Status: DISCONTINUED | OUTPATIENT
Start: 2024-08-29 | End: 2024-08-29 | Stop reason: SURG

## 2024-08-29 RX ORDER — OXYCODONE HYDROCHLORIDE 5 MG/1
5 TABLET ORAL EVERY 4 HOURS PRN
Qty: 40 TABLET | Refills: 0 | Status: SHIPPED | OUTPATIENT
Start: 2024-08-29

## 2024-08-29 RX ORDER — PHENYLEPHRINE HCL IN 0.9% NACL 1 MG/10 ML
SYRINGE (ML) INTRAVENOUS AS NEEDED
Status: DISCONTINUED | OUTPATIENT
Start: 2024-08-29 | End: 2024-08-29 | Stop reason: SURG

## 2024-08-29 RX ORDER — GLYCOPYRROLATE 0.2 MG/ML
INJECTION INTRAMUSCULAR; INTRAVENOUS AS NEEDED
Status: DISCONTINUED | OUTPATIENT
Start: 2024-08-29 | End: 2024-08-29 | Stop reason: SURG

## 2024-08-29 RX ORDER — SODIUM CHLORIDE 9 MG/ML
40 INJECTION, SOLUTION INTRAVENOUS AS NEEDED
Status: DISCONTINUED | OUTPATIENT
Start: 2024-08-29 | End: 2024-08-29 | Stop reason: HOSPADM

## 2024-08-29 RX ORDER — FENTANYL CITRATE 50 UG/ML
INJECTION, SOLUTION INTRAMUSCULAR; INTRAVENOUS
Status: COMPLETED
Start: 2024-08-29 | End: 2024-08-29

## 2024-08-29 RX ORDER — ROPIVACAINE HYDROCHLORIDE 2 MG/ML
INJECTION, SOLUTION EPIDURAL; INFILTRATION; PERINEURAL CONTINUOUS
Status: DISCONTINUED | OUTPATIENT
Start: 2024-08-29 | End: 2024-08-29 | Stop reason: HOSPADM

## 2024-08-29 RX ORDER — ONDANSETRON 2 MG/ML
INJECTION INTRAMUSCULAR; INTRAVENOUS AS NEEDED
Status: DISCONTINUED | OUTPATIENT
Start: 2024-08-29 | End: 2024-08-29 | Stop reason: SURG

## 2024-08-29 RX ORDER — FENTANYL CITRATE 50 UG/ML
INJECTION, SOLUTION INTRAMUSCULAR; INTRAVENOUS
Status: COMPLETED | OUTPATIENT
Start: 2024-08-29 | End: 2024-08-29

## 2024-08-29 RX ORDER — VANCOMYCIN HYDROCHLORIDE 1 G/20ML
INJECTION, POWDER, LYOPHILIZED, FOR SOLUTION INTRAVENOUS AS NEEDED
Status: DISCONTINUED | OUTPATIENT
Start: 2024-08-29 | End: 2024-08-29 | Stop reason: HOSPADM

## 2024-08-29 RX ADMIN — TRANEXAMIC ACID 1000 MG: 10 INJECTION, SOLUTION INTRAVENOUS at 11:10

## 2024-08-29 RX ADMIN — TRANEXAMIC ACID 1000 MG: 10 INJECTION, SOLUTION INTRAVENOUS at 10:09

## 2024-08-29 RX ADMIN — ROCURONIUM BROMIDE 50 MG: 10 INJECTION INTRAVENOUS at 09:54

## 2024-08-29 RX ADMIN — GLYCOPYRROLATE 0.2 MCG: 0.2 INJECTION INTRAMUSCULAR; INTRAVENOUS at 10:51

## 2024-08-29 RX ADMIN — ACETAMINOPHEN 1000 MG: 500 TABLET ORAL at 08:40

## 2024-08-29 RX ADMIN — LIDOCAINE HYDROCHLORIDE 0.5 ML: 10 INJECTION, SOLUTION EPIDURAL; INFILTRATION; INTRACAUDAL; PERINEURAL at 08:40

## 2024-08-29 RX ADMIN — PREGABALIN 75 MG: 75 CAPSULE ORAL at 08:40

## 2024-08-29 RX ADMIN — DEXAMETHASONE SODIUM PHOSPHATE 4 MG: 4 INJECTION INTRA-ARTICULAR; INTRALESIONAL; INTRAMUSCULAR; INTRAVENOUS; SOFT TISSUE at 10:09

## 2024-08-29 RX ADMIN — SODIUM CHLORIDE 2000 MG: 900 INJECTION INTRAVENOUS at 09:59

## 2024-08-29 RX ADMIN — SODIUM CHLORIDE, POTASSIUM CHLORIDE, SODIUM LACTATE AND CALCIUM CHLORIDE: 600; 310; 30; 20 INJECTION, SOLUTION INTRAVENOUS at 09:48

## 2024-08-29 RX ADMIN — Medication 1000 MG: at 11:50

## 2024-08-29 RX ADMIN — ONDANSETRON 4 MG: 2 INJECTION INTRAMUSCULAR; INTRAVENOUS at 11:15

## 2024-08-29 RX ADMIN — BUPIVACAINE HYDROCHLORIDE 12 ML: 2.5 INJECTION, SOLUTION EPIDURAL; INFILTRATION; INTRACAUDAL; PERINEURAL at 09:25

## 2024-08-29 RX ADMIN — SUGAMMADEX 200 MG: 100 INJECTION, SOLUTION INTRAVENOUS at 11:36

## 2024-08-29 RX ADMIN — Medication 100 MCG: at 10:06

## 2024-08-29 RX ADMIN — Medication 200 MCG: at 10:13

## 2024-08-29 RX ADMIN — PROPOFOL 150 MG: 10 INJECTION, EMULSION INTRAVENOUS at 09:54

## 2024-08-29 RX ADMIN — GLYCOPYRROLATE 0.2 MCG: 0.2 INJECTION INTRAMUSCULAR; INTRAVENOUS at 10:46

## 2024-08-29 RX ADMIN — FAMOTIDINE 20 MG: 20 TABLET, FILM COATED ORAL at 08:40

## 2024-08-29 RX ADMIN — PHENYLEPHRINE HYDROCHLORIDE 0.5 MCG/KG/MIN: 10 INJECTION INTRAVENOUS at 10:16

## 2024-08-29 RX ADMIN — Medication 100 MCG: at 10:10

## 2024-08-29 RX ADMIN — FENTANYL CITRATE 100 MCG: 50 INJECTION, SOLUTION INTRAMUSCULAR; INTRAVENOUS at 09:25

## 2024-08-29 RX ADMIN — LIDOCAINE HYDROCHLORIDE 50 MG: 10 INJECTION, SOLUTION EPIDURAL; INFILTRATION; INTRACAUDAL; PERINEURAL at 09:54

## 2024-08-29 RX ADMIN — FENTANYL CITRATE 50 MCG: 50 INJECTION, SOLUTION INTRAMUSCULAR; INTRAVENOUS at 11:56

## 2024-08-29 RX ADMIN — Medication 100 MCG: at 10:03

## 2024-08-29 NOTE — ANESTHESIA PROCEDURE NOTES
Airway  Urgency: elective    Date/Time: 8/29/2024 9:58 AM  Airway not difficult    General Information and Staff    Patient location during procedure: OR  SRNA: Anupama Vargas SRNA  Indications and Patient Condition  Indications for airway management: airway protection    Preoxygenated: yes  MILS not maintained throughout  Mask difficulty assessment: 2 - vent by mask + OA or adjuvant +/- NMBA    Final Airway Details  Final airway type: endotracheal airway      Successful airway: ETT  Cuffed: yes   Successful intubation technique: direct laryngoscopy  Endotracheal tube insertion site: oral  Blade: Zana  Blade size: 3  ETT size (mm): 7.5  Cormack-Lehane Classification: grade I - full view of glottis  Placement verified by: chest auscultation and capnometry   Measured from: lips  ETT/EBT  to lips (cm): 21  Number of attempts at approach: 1  Assessment: lips, teeth, and gum same as pre-op and atraumatic intubation    Additional Comments  Negative epigastric sounds, Breath sound equal bilaterally with symmetric chest rise and fall

## 2024-08-29 NOTE — THERAPY EVALUATION
Patient Name: Nolberto Jackson Jr.  : 1955    MRN: [id number]                              Today's Date: 2024       Admit Date: 2024    Visit Dx: No diagnosis found.  Patient Active Problem List   Diagnosis    Glenohumeral arthritis, right    HTN (hypertension)    Hyperlipidemia    Status post total shoulder arthroplasty, right     Past Medical History:   Diagnosis Date    Colon polyp     HX OF    Dialysis patient     NO DILAYSIS SINCE     Fistula     LEFT ARM, DOES NOT WORK    Gout     Hard of hearing     Hearing aid worn     RIGHT EAR    History of hepatitis C     treated 2009    History of kidney cancer 2017    LEFT KIDNEY    History of transfusion     PATIENT DENIES REACTION    Hyperlipidemia     Hypertension     Liver cirrhosis     MRSA infection     D/T COLON CANCER SURGERY    Wears dentures     UPPER AND LOWER     Past Surgical History:   Procedure Laterality Date    COLECTOMY PARTIAL / TOTAL Left     ,     COLONOSCOPY      NEPHRECTOMY Left     TOTAL SHOULDER ARTHROPLASTY Right 2024    Procedure: TOTAL SHOULDER ARTHROPLASTY - RIGHT LEFT SHOULDER STEROID INJECTION;  Surgeon: Jhon Fang MD;  Location: [facility];  Service: Orthopedics;  Laterality: Right;    URETERECTOMY Left     cancer      General Information       Row Name 24 1607          Physical Therapy Time and Intention    Document Type evaluation  -AB     Mode of Treatment physical therapy  -AB       Row Name 24 1607          General Information    Patient Profile Reviewed yes  -AB     Prior Level of Function independent:;all household mobility;community mobility;gait;transfer;bed mobility;min assist:;ADL's  -AB     Existing Precautions/Restrictions fall;non-weight bearing;left;shoulder;other (see comments)  interscalene nerve catheter, Carias sling  -AB     Barriers to Rehab none identified  -AB       Row Name 24 1607          Living Environment    People in Home significant other   -AB       Row Name 08/29/24 1607          Home Main Entrance    Number of Stairs, Main Entrance two  -AB     Stair Railings, Main Entrance none  -AB       Row Name 08/29/24 1607          Stairs Within Home, Primary    Number of Stairs, Within Home, Primary none  -AB       Row Name 08/29/24 1607          Cognition    Orientation Status (Cognition) oriented x 4  -AB       Row Name 08/29/24 1607          Safety Issues, Functional Mobility    Safety Issues Affecting Function (Mobility) awareness of need for assistance;insight into deficits/self-awareness;safety precaution awareness  -AB     Impairments Affecting Function (Mobility) balance;endurance/activity tolerance;pain;range of motion (ROM);sensation/sensory awareness  -AB               User Key  (r) = Recorded By, (t) = Taken By, (c) = Cosigned By      Initials Name Provider Type    AB Shyann Castro, PT Physical Therapist                   Mobility       Row Name 08/29/24 1612          Bed Mobility    Comment, (Bed Mobility) Received EOB and left in w/c with RN  -AB       Row Name 08/29/24 1612          Transfers    Comment, (Transfers) Cues for hand placement and sequencing. Issued cane for safe ambulation.  -AB       Row Name 08/29/24 1612          Sit-Stand Transfer    Sit-Stand [place] (Transfers) contact guard;verbal cues;1 person assist  -AB     Assistive Device (Sit-Stand Transfers) cane, straight  -AB       Row Name 08/29/24 1612          Gait/Stairs (Locomotion)    [place] Level (Gait) contact guard;1 person assist;verbal cues  -AB     Assistive Device (Gait) cane, straight  -AB     Patient was able to Ambulate yes  -AB     Distance in Feet (Gait) 200  -AB     Deviations/Abnormal Patterns (Gait) bilateral deviations;santiago decreased;gait speed decreased  -AB     Bilateral Gait Deviations heel strike decreased  -AB     [place] Level (Stairs) contact guard;2 person assist;verbal cues  -AB     Assistive Device (Stairs) cane, straight  -AB      Number of Steps (Stairs) 1  -AB     Ascending Technique (Stairs) step-to-step  -AB     Descending Technique (Stairs) step-to-step  -AB     Comment, (Gait/Stairs) Pt ambulated with step through gait pattern and slowed santiago. Cues provided for sequencing with cane. Pt also navigated 1 step with CGA and SPC. Good stability throughout with no LOB or knee buckling.  -AB       Row Name 08/29/24 1612          Mobility    Extremity Weight-bearing Status left upper extremity  -AB     Left Upper Extremity (Weight-bearing Status) non weight-bearing (NWB)  -AB               User Key  (r) = Recorded By, (t) = Taken By, (c) = Cosigned By      Initials Name Provider Type    AB Shyann Castro, PT Physical Therapist                   Obj/Interventions       Row Name 08/29/24 1616          Range of Motion Comprehensive    General Range of Motion bilateral lower extremity ROM WNL  -AB       Row Name 08/29/24 1616          Strength Comprehensive (MMT)    General Manual Muscle Testing (MMT) Assessment lower extremity strength deficits identified  -AB     Comment, General Manual Muscle Testing (MMT) Assessment BLE grossly 4+/5  -AB       Row Name 08/29/24 1616          Balance    Balance Assessment sitting static balance;sitting dynamic balance;standing static balance;standing dynamic balance  -AB     Static Sitting Balance independent  -AB     Dynamic Sitting Balance independent  -AB     Position, Sitting Balance unsupported;sitting edge of bed  -AB     Static Standing Balance contact guard  -AB     Dynamic Standing Balance contact guard;1-person assist  -AB     Position/Device Used, Standing Balance supported;cane, straight  -AB     Balance Interventions sitting;standing;sit to stand;supported;static;dynamic;occupation based/functional task  -AB     Comment, Balance no LOB.  -AB       Row Name 08/29/24 1616          Sensory Assessment (Somatosensory)    Sensory Assessment (Somatosensory) LE sensation intact  -AB                User Key  (r) = Recorded By, (t) = Taken By, (c) = Cosigned By      Initials Name Provider Type    AB Shyann Csatro PT Physical Therapist                   Goals/Plan       Row Name 08/29/24 1619          Bed Mobility Goal 1 (PT)    Activity/Assistive Device (Bed Mobility Goal 1, PT) sit to supine;supine to sit  -AB     [place] Level/Cues Needed (Bed Mobility Goal 1, PT) independent  -AB     Time Frame (Bed Mobility Goal 1, PT) short term goal (STG);3 days  -AB       Row Name 08/29/24 1619          Transfer Goal 1 (PT)    Activity/Assistive Device (Transfer Goal 1, PT) sit-to-stand/stand-to-sit;bed-to-chair/chair-to-bed  -AB     [place] Level/Cues Needed (Transfer Goal 1, PT) independent  -AB     Time Frame (Transfer Goal 1, PT) long term goal (LTG);5 days  -AB       Row Name 08/29/24 1619          Gait Training Goal 1 (PT)    Activity/Assistive Device (Gait Training Goal 1, PT) gait (walking locomotion);assistive device use;cane, straight  -AB     [place] Level (Gait Training Goal 1, PT) modified independence  -AB     Distance (Gait Training Goal 1, PT) 400  -AB     Time Frame (Gait Training Goal 1, PT) long term goal (LTG);5 days  -AB       Row Name 08/29/24 1619          Stairs Goal 1 (PT)    Activity/Assistive Device (Stairs Goal 1, PT) ascending stairs;descending stairs;cane, straight  -AB     [place] Level/Cues Needed (Stairs Goal 1, PT) standby assist  -AB     Number of Stairs (Stairs Goal 1, PT) 2  -AB     Time Frame (Stairs Goal 1, PT) long term goal (LTG);5 days  -AB       Row Name 08/29/24 1619          Therapy Assessment/Plan (PT)    Planned Therapy Interventions (PT) balance training;bed mobility training;gait training;home exercise program;patient/family education;postural re-education;transfer training;stretching;stair training;ROM (range of motion);strengthening  -AB               User Key  (r) = Recorded By, (t) = Taken By, (c) = Cosigned By      Initials Name Provider Type     AB Shyann Castro, PT Physical Therapist                   Clinical Impression       Row Name 08/29/24 1616          Pain    Pretreatment Pain Rating 7/10  -AB     Posttreatment Pain Rating 5/10  -AB     Pain Location - Side/Orientation Left  -AB     Pain Location generalized  -AB     Pain Location - shoulder  -AB     Pre/Posttreatment Pain Comment tolerated  -AB     Pain Intervention(s) Ambulation/increased activity;Repositioned  -AB       Row Name 08/29/24 1616          Plan of Care Review    Plan of Care Reviewed With patient;significant other  -AB     Progress no change  -AB     Outcome Evaluation PT initial eval completed. Pt presents below baseline with limitations related to NWB status of LUE. Pt ambulated 200' with CGA and SPC. He also navigated 1 step with good stability. Further IPPT is warrented. PT rec d/c home with initial 24/7 assist.  -AB       Row Name 08/29/24 1616          Therapy Assessment/Plan (PT)    Rehab Potential (PT) good, to achieve stated therapy goals  -AB     Criteria for Skilled Interventions Met (PT) yes;meets criteria;skilled treatment is necessary  -AB     Therapy Frequency (PT) daily  -AB       Row Name 08/29/24 1616          Vital Signs    O2 Delivery Pre Treatment room air  -AB     O2 Delivery Intra Treatment room air  -AB     O2 Delivery Post Treatment room air  -AB     Pre Patient Position Sitting  -AB     Intra Patient Position Standing  -AB     Post Patient Position Sitting  -AB       Row Name 08/29/24 1616          Positioning and Restraints    Pre-Treatment Position in bed  -AB     Post Treatment Position wheelchair  -AB     In Wheelchair notified nsg;reclined;sitting;with family/caregiver;with nsg  With RN in PACU  -AB               User Key  (r) = Recorded By, (t) = Taken By, (c) = Cosigned By      Initials Name Provider Type    AB Shyann Castro, PT Physical Therapist                   Outcome Measures       Row Name 08/29/24 1619          How much help from another  person do you currently need...    Turning from your back to your side while in flat bed without using bedrails? 4  -AB     Moving from lying on back to sitting on the side of a flat bed without bedrails? 3  -AB     Moving to and from a bed to a chair (including a wheelchair)? 3  -AB     Standing up from a chair using your arms (e.g., wheelchair, bedside chair)? 3  -AB     Climbing 3-5 steps with a railing? 3  -AB     To walk in hospital room? 3  -AB     AM-PAC 6 Clicks Score (PT) 19  -AB     Highest Level of Mobility Goal 6 --> Walk 10 steps or more  -AB       Row Name 08/29/24 1619 08/29/24 1558       Functional Assessment    Outcome Measure Options AM-PAC 6 Clicks Basic Mobility (PT)  -AB AM-PAC 6 Clicks Daily Activity (OT)  -AR              User Key  (r) = Recorded By, (t) = Taken By, (c) = Cosigned By      Initials Name Provider Type    AR Dania Boone, OT Occupational Therapist    AB Shyann Castro, PT Physical Therapist                                 Physical Therapy Education       Title: PT OT SLP Therapies (In Progress)       Topic: Physical Therapy (In Progress)       Point: Mobility training (Done)       Learning Progress Summary             Patient Acceptance, E,D, VU,DU by AB at 8/29/2024 1619                         Point: Home exercise program (Not Started)       Learner Progress:  Not documented in this visit.              Point: Body mechanics (Done)       Learning Progress Summary             Patient Acceptance, E,D, VU,DU by AB at 8/29/2024 1619                         Point: Precautions (Done)       Learning Progress Summary             Patient Acceptance, E,D, VU,DU by AB at 8/29/2024 1619                                         User Key       Initials Effective Dates Name Provider Type Discipline    AB 09/22/22 -  Shyann Castro, PT Physical Therapist PT                  PT Recommendation and Plan  Planned Therapy Interventions (PT): balance training, bed mobility training, gait  training, home exercise program, patient/family education, postural re-education, transfer training, stretching, stair training, ROM (range of motion), strengthening  Plan of Care Reviewed With: patient, significant other  Progress: no change  Outcome Evaluation: PT initial eval completed. Pt presents below baseline with limitations related to NWB status of LUE. Pt ambulated 200' with CGA and SPC. He also navigated 1 step with good stability. Further IPPT is warrented. PT rec d/c home with initial 24/7 assist.     Time Calculation:   PT Evaluation Complexity  History, PT Evaluation Complexity: 1-2 personal factors and/or comorbidities  Examination of Body Systems (PT Eval Complexity): total of 3 or more elements  Clinical Presentation (PT Evaluation Complexity): stable  Clinical Decision Making (PT Evaluation Complexity): low complexity  Overall Complexity (PT Evaluation Complexity): low complexity     PT Charges       Row Name 08/29/24 1620             Time Calculation    Start Time 1500  -AB      PT Received On 08/29/24  -AB      PT Goal Re-Cert Due Date 09/08/24  -AB         Untimed Charges    PT Eval/Re-eval Minutes 35  -AB         Total Minutes    Untimed Charges Total Minutes 35  -AB       Total Minutes 35  -AB                User Key  (r) = Recorded By, (t) = Taken By, (c) = Cosigned By      Initials Name Provider Type    AB Shyann Castro, PT Physical Therapist                  Therapy Charges for Today       Code Description Service Date Service Provider Modifiers Qty    32524684412 HC PT EVAL LOW COMPLEXITY 3 8/29/2024 Shyann Castro, PT GP 1    12107316898 HC PT THER SUPP EA 15 MIN 8/29/2024 Shyann Castro, PT GP 2            PT G-Codes  Outcome Measure Options: AM-PAC 6 Clicks Basic Mobility (PT)  AM-PAC 6 Clicks Score (PT): 19  AM-PAC 6 Clicks Score (OT): 16  PT Discharge Summary  Anticipated Discharge Disposition (PT): home with 24/7 care    Shyann Castro PT  8/29/2024

## 2024-08-29 NOTE — ANESTHESIA POSTPROCEDURE EVALUATION
Patient: Nolberto Jackson Jr.    Procedure Summary       Date: 08/29/24 Room / Location:  [facility] [address] [place] [address] [facility]    Anesthesia Start: 0948 Anesthesia Stop: 1150    Procedure: LEFT TOTAL SHOULDER ARTHROPLASTY (Left: Shoulder) Diagnosis:     Surgeons: Jhon Fang MD Provider: Ralf Hamilton MD    Anesthesia Type: general ASA Status: 3            Anesthesia Type: general    Vitals  Vitals Value Taken Time   /75 08/29/24 1430   Temp 97.7 °F (36.5 °C) 08/29/24 1430   Pulse 55 08/29/24 1430   Resp 18 08/29/24 1430   SpO2 98 % 08/29/24 1430           Post Anesthesia Care and Evaluation    Patient location during evaluation: PACU  Patient participation: complete - patient participated  Level of consciousness: awake and alert  Pain management: adequate    Airway patency: patent  Anesthetic complications: No anesthetic complications  PONV Status: none  Cardiovascular status: hemodynamically stable and acceptable  Respiratory status: nonlabored ventilation, acceptable and nasal cannula  Hydration status: acceptable

## 2024-08-29 NOTE — PLAN OF CARE
Goal Outcome Evaluation:  Plan of Care Reviewed With: patient, significant other        Progress: no change  Outcome Evaluation: PT initial eval completed. Pt presents below baseline with limitations related to NWB status of LUE. Pt ambulated 200' with CGA and SPC. He also navigated 1 step with good stability. Further IPPT is warrented. PT rec d/c home with initial 24/7 assist.      Anticipated Discharge Disposition (PT): home with 24/7 care

## 2024-08-29 NOTE — ANESTHESIA PREPROCEDURE EVALUATION
Anesthesia Evaluation                  Airway   Mallampati: I  TM distance: >3 FB  Neck ROM: full  No difficulty expected  Dental      Pulmonary    Cardiovascular     ECG reviewed    (+) hypertension      Neuro/Psych  GI/Hepatic/Renal/Endo    (+) hepatitis C, liver disease, renal disease-    Musculoskeletal     Abdominal    Substance History      OB/GYN          Other   arthritis,                 Anesthesia Plan    ASA 3     general     (Left isnb/c marked)  intravenous induction     Anesthetic plan, risks, benefits, and alternatives have been provided, discussed and informed consent has been obtained with: patient.    Plan discussed with CRNA.    CODE STATUS:

## 2024-08-29 NOTE — OP NOTE
DATE OF OPERATION: 08/29/24     PREOPERATIVE DIAGNOSIS: left shoulder glenohumeral arthritis with medialization of glenoid      POSTOPERATIVE DIAGNOSES:  1. same     PROCEDURES PERFORMED:  1. left reverse total shoulder arthroplasty.          SURGEON: Jhon Fang MD           Assistant: Yazmin Reid PA  ** Please note the physician assistant was medically necessary to assist with positioning retraction, arm positioning, care of soft tissues and closure      ANESTHESIA: General plus block.       ESTIMATED BLOOD LOSS:100mL.       COMPLICATIONS: None.       DISPOSITION: Recovery room in stable condition.      IMPLANTS:  Tornier reverse total shoulder system  Humeral Stem: size 4  Humeral Tray: as above   Polyethylene Liner: 42+0, 10 deg  Baseplate: 29 full wedge, short pressfit post  Glenosphere: 42 std      INDICATIONS: This is a 68 yo male with left shoulder pain and limited function and motion secondary to above diagnosis. They have failed conservative treatment and after a discussion of risks, benefits, and alternatives, wished to proceed with shoulder arthroplasty.     DESCRIPTION OF PROCEDURE: On the day of surgery, the patient identified the left shoulder as the correct operative extremity. This was initialed by the surgeon with the patients's acknowledgment. The patient underwent placement of an interscalene block and was taken to the operating room and placed in the supine position. Upon induction of adequate anesthesia, the patient was brought up to the beach chair position and the shoulder and upper extremity were prepped and draped in the usual sterile fashion. Timeout confirmed the correct patient and operative extremity as well as that antibiotics were on board. A standard deltopectoral approach to the shoulder was carried out. It was carried sharply through the skin and subcutaneous tissue. Medial and lateral flaps were developed over the deltopectoral fascia. The cephalic vein was identified and  mobilized laterally with the deltoid. The subdeltoid and subpectoral spaces were mobilized and a blunt retractor was placed deep to this. The clavipectoral fascia was opened on the lateral edge of the conjoined tendon and the retractor was moved deep to this. The leading edge of the pectoralis was released exposing the long head of the biceps. This was tenosynovitic and therefore tenotomized. The 3 sisters were identified and coagulated. A subscapularis tenotomy was performed and rotator interval was released to the glenoid exposing the humeral head. The inferior capsule was released directly off the humerus to allow greater than 90° of external rotation. The anatomic neck was exposed and the humeral head osteotomy was performed in approximately 20° of retroversion. The remainder of the osteophytes were removed. The canal was then entered, reamed, and broached. The final stem impacted in in approximately 20° of retroversion. A head protector was placed. The humerus was subluxed posteriorly. The glenoid exposed. The glenoid was severely medialized and as such a reverse total shoulder was elected. Circumferential labral excision and capsular release were performed. A  mobilization of the subscapularis was carried out as well.  A centering hole was drilled. The glenoid was gently reamed and then the  central hole for the baseplate was drilled  glenoid baseplate inserted.  Screws were then placed through the baseplate  The glenosphere was then inserted and locked into place with a set screw.  The humerus was carefully subluxed back anteriorly. A liner tray and polyethylene were placed and trialing was carried out. The appropriate final sizes were chosen and locked into place.  The shoulder was then reduced.  This allowed nearly full passive range of motion with no instability. The joint was copiously irrigated with orthopedic irrigation mixed with Betadine after the final implants were assembled and locked into place.       vancomycin powder was placed in the wound       The deltopectoral interval was approximated with 0 Vicryl, the subcutaneous tissue with 2-0 Vicryl, and the skin with nylon. A sterile dressing was placed. Anesthesia was reversed and the patient was taken to the recovery room in stable condition. All instrument, needle, and sponge counts were correct.       Jhon Fang MD, MS

## 2024-08-29 NOTE — PLAN OF CARE
Goal Outcome Evaluation:  Plan of Care Reviewed With: patient, significant other           Outcome Evaluation: OT educated pt and family on shoulder precautions, ADL retraining to maintain, sling management and HEP. Pt able to complete HEP with supervision and SO able to manage brace with CGA. Pt ambulated 200 feet with CGA and passed mobility screen, however he demo mild lateral LOB sways that improved with RUE HHA. Recommend PT eval for possible AD as he is agreeable to use one if recommended. Recommend DC home with initial 24/7 assist from SO.      Anticipated Discharge Disposition (OT): home with 24/7 care

## 2024-08-29 NOTE — ANESTHESIA PROCEDURE NOTES
Interscalene cath      Patient reassessed immediately prior to procedure    Patient location during procedure: pre-op  Start time: 8/29/2024 9:07 AM  Stop time: 8/29/2024 9:25 AM  Reason for block: at surgeon's request and post-op pain management  Performed by  CRNA/CAA: Lauro Coleman, CRNA  Assisted by: Shyann Lynne RN  Preanesthetic Checklist  Completed: patient identified, IV checked, site marked, risks and benefits discussed, surgical consent, monitors and equipment checked, pre-op evaluation and timeout performed  Prep:  Pt Position: right lateral decubitus  Sterile barriers:cap, gloves, mask and washed/disinfected hands  Prep: ChloraPrep  Patient monitoring: blood pressure monitoring, continuous pulse oximetry and EKG  Procedure    Sedation: yes  Performed under: local infiltration  Guidance:ultrasound guided    ULTRASOUND INTERPRETATION.  Using ultrasound guidance a 20 G gauge needle was placed in close proximity to the nerve, at which point, under ultrasound guidance anesthetic was injected in the area of the nerve and spread of the anesthesia was seen on ultrasound in close proximity thereto.  There were no abnormalities seen on ultrasound; a digital image was taken; and the patient tolerated the procedure with no complications. Images:still images obtained, printed/placed on chart    Laterality:left  Block Type:interscalene  Injection Technique:catheter  Needle Type:Tuohy and echogenic  Needle Gauge:18 G  Resistance on Injection: none  Catheter Size:20 G (20g)  Cath Depth at skin: 9 cm    Medications Used: fentaNYL citrate (PF) (SUBLIMAZE) injection - Intravenous   100 mcg - 8/29/2024 9:25:00 AM  bupivacaine PF (MARCAINE) 0.25 % injection - Injection   12 mL - 8/29/2024 9:25:00 AM      Post Assessment  Injection Assessment: negative aspiration for heme, no paresthesia on injection and incremental injection  Patient Tolerance:comfortable throughout block  Complications:no  Additional  "Notes  CATHETER  A high-frequency linear transducer, with sterile cover, was placed in the supraclavicular fossa to identify the subclavian artery and trunks and divisions of the brachial plexus. The transducer was then moved in a cephalad orientation with a slight rotation to continue visualization of the brachial plexus from the trunks and divisions, on to the C5-C7 roots. The insertion site was prepped and draped in sterile fashion. Skin and cutaneous tissue was infiltrated with 2-5 ml of 1% Lidocaine. Using ultrasound-guidance, an 18-gauge Contiplex Ultra 360 Touhy needle was advanced in plane from lateral to medial. Preservative-free normal saline was utilized for hydro-dissection of tissue, advancement of Touhy, and to confirm final needle placement at the fascial plane between the middle scalene muscle and sheath of the brachial plexus (C5-C7). A 20-gauge Contiplex Echo catheter was placed through the needle and advance out the tip of the Touhy 3-5 cm with the \"Awad Flip\". The Touhy needle was then removed, and final catheter position verified lateral to the brachial plexus with local anesthetic (LA) and ultrasound visualization. The catheter was secured in the usual fashion with skin glue, benzoin, steri-strips, CHG tegaderm and label noting \"Nerve Block Catheter\". Jerk tape applied at yellow connector and catheter connection. All LA was injected in increments of 3-5 ml after catheter secured. Aspiration every 5 ml to prevent intravascular injection. Injection was completed with negative aspiration of blood and negative intravascular injection. Injection pressures were normal with minimal resistance.   Performed by: Lauro Coleman, CRNA            "

## 2024-08-29 NOTE — DISCHARGE INSTRUCTIONS
InfuBLOCK - Patient Information    What is a pain pump?  The InfuBLOCK pump delivers post-operative, non-narcotic, numbing medication to the nerve near the surgical site for pain relief.     Where can I find information about my pain pump?           For more information about your pain pump, scan the QR code.  For additional patient resources, visit [email or web address].                                                                                               While your physician is your primary source for information about your treatment there may be times during your treatment that you need assistance with your infusion pump.     If you need assistance take the following steps:    The [facility] Nursing Hotline is Here for You 24/7.  Please call [phone] for the following concerns or complications:    Answers to questions about your infusion pump                 Tubing disconnect  Assistance with pump alarms                                                      Dislodged catheter  Excessive leakage noted from pump                                         Inadequate pain control    2.   [facility] Anesthesia Acute Pain Service: [phone] is available 24/7 for any further needs or concerns about medication or pain control.     Nerve Catheter Removal Instructions  When your device is empty:    Remove your catheter by pulling the dressing off slowly (like you would remove a regular bandage). The catheter should pull right out of the skin.  Check that the BLUE tip is intact.                                                                                     If the catheter is stuck, reposition your   extremity and pull slowly until removed.  *If catheter is HURTING and WON'T come out, stop and call [phone] for further assistance.    Remove medication bag from the black carrying case.  Cut the tubing on right and left side of pump, and discard the medication bag and tubing  into garbage.  Place the pump and black carrying case into the plastic bag and then place this into the return box.  Seal box with blue stickers and return to [place] postal service. THIS IS PRE-PAID POSTAGE.

## 2024-08-29 NOTE — THERAPY TREATMENT NOTE
Patient Name: Nolberto Jackson Jr.  : 1955    MRN: [id number]                              Today's Date: 2024       Admit Date: 2024    Visit Dx: No diagnosis found.  Patient Active Problem List   Diagnosis    Glenohumeral arthritis, right    HTN (hypertension)    Hyperlipidemia    Status post total shoulder arthroplasty, right     Past Medical History:   Diagnosis Date    Colon polyp     HX OF    Dialysis patient     NO DILAYSIS SINCE     Fistula     LEFT ARM, DOES NOT WORK    Gout     Hard of hearing     Hearing aid worn     RIGHT EAR    History of hepatitis C     treated 2009    History of kidney cancer 2017    LEFT KIDNEY    History of transfusion     PATIENT DENIES REACTION    Hyperlipidemia     Hypertension     Liver cirrhosis     MRSA infection     D/T COLON CANCER SURGERY    Wears dentures     UPPER AND LOWER     Past Surgical History:   Procedure Laterality Date    COLECTOMY PARTIAL / TOTAL Left     ,     COLONOSCOPY      NEPHRECTOMY Left     TOTAL SHOULDER ARTHROPLASTY Right 2024    Procedure: TOTAL SHOULDER ARTHROPLASTY - RIGHT LEFT SHOULDER STEROID INJECTION;  Surgeon: Jhon Fang MD;  Location: [facility];  Service: Orthopedics;  Laterality: Right;    URETERECTOMY Left     cancer      General Information       Row Name 24 1542          OT Time and Intention    Document Type evaluation  -AR     Mode of Treatment individual therapy;occupational therapy  -AR       Row Name 24 1542          General Information    Patient Profile Reviewed yes  -AR     Prior Level of Function independent:;all household mobility;community mobility;gait;transfer;min assist:;ADL's  -AR     Existing Precautions/Restrictions fall;non-weight bearing;left;shoulder;other (see comments)  interscalene nerve catheter, [place] sling  -AR     Barriers to Rehab none identified  -AR       Row Name 24 1542          Occupational Profile    Reason for Services/Referral  (Occupational Profile) Pt caregiver training was provided face-to-face on strategies and techniques related to activities of daily living, transfers, mobility, home safety, durable medical equipment, and brace management for 40 minutes without the patient present.  -AR       Row Name 08/29/24 1542          Living Environment    People in Home significant other  -AR       Row Name 08/29/24 1542          Home Main Entrance    Number of Stairs, Main Entrance two  -AR     Stair Railings, Main Entrance none  -AR       Row Name 08/29/24 1542          Stairs Within Home, Primary    Number of Stairs, Within Home, Primary none  -AR       Row Name 08/29/24 1542          Cognition    Orientation Status (Cognition) oriented x 4  -AR       Row Name 08/29/24 1542          Safety Issues, Functional Mobility    Safety Issues Affecting Function (Mobility) awareness of need for assistance;safety precaution awareness;safety precautions follow-through/compliance  -AR     Impairments Affecting Function (Mobility) balance;endurance/activity tolerance;pain;range of motion (ROM);sensation/sensory awareness  -AR               User Key  (r) = Recorded By, (t) = Taken By, (c) = Cosigned By      Initials Name Provider Type    Dania Barrera OT Occupational Therapist                     Mobility/ADL's       Row Name 08/29/24 1545          Bed Mobility    Bed Mobility scooting/bridging;supine-sit  -AR     Scooting/Bridging [place] (Bed Mobility) supervision  -AR     Supine-Sit [place] (Bed Mobility) supervision;verbal cues  -AR     Comment, (Bed Mobility) OT educated pt and family on importance of maintaining NWB and safe sleeping position.  -AR       Row Name 08/29/24 1547          Transfers    Transfers sit-stand transfer;stand-sit transfer  -AR     Comment, (Transfers) Educated pt and family on importance of attending to interscalene nerve catheter to avoid dislodgement.  -AR       Row Name 08/29/24 1541          Sit-Stand  Transfer    Sit-Stand [place] (Transfers) contact guard;verbal cues  -AR       Row Name 08/29/24 1545          Stand-Sit Transfer    Stand-Sit [place] (Transfers) contact guard;verbal cues  -AR       Row Name 08/29/24 1545          Functional Mobility    Functional Mobility- Ind. Level contact guard assist  -AR     Functional Mobility-Distance (Feet) 200  -AR     Functional Mobility- Comment Pt with mild lateral LOB that improved with RUE HHA. He may benefit from AD to stabilize him and is agreeable to trial. Recommend PT eval in spite of passing mobility score for this reason.  -AR     Patient was able to Ambulate yes  -AR       Row Name 08/29/24 1545          Activities of Daily Living    BADL Assessment/Intervention upper body dressing;bathing;lower body dressing  -AR       Row Name 08/29/24 1545          Mobility    Extremity Weight-bearing Status left upper extremity  -AR     Left Upper Extremity (Weight-bearing Status) non weight-bearing (NWB)  -AR       Row Name 08/29/24 1545          Upper Body Dressing Assessment/Training    [place] Level (Upper Body Dressing) doff;front opening garment;moderate assist (50% patient effort);don;maximum assist (25% patient effort)  sling  -AR     Position (Upper Body Dressing) edge of bed sitting  -AR     Comment, (Upper Body Dressing) Educated pt and family on shoulder precautions, ADL retraining to maintain, sling management and care of interscalene nerve catheter during ADLs to avoid dislodgement. Post teaching, SO able to don sling with CGA.  -AR       Row Name 08/29/24 1545          Bathing Assessment/Intervention    Comment, (Bathing) Educated pt and SO on shoulder precautions and axilla care to maintain, reviewed that nerve catheter cannot get wet.  -AR       Row Name 08/29/24 1545          Lower Body Dressing Assessment/Training    [place] Level (Lower Body Dressing) don;pants/bottoms;moderate assist (50% patient effort)  -AR     Position (Lower  Body Dressing) edge of bed sitting;unsupported standing  -AR               User Key  (r) = Recorded By, (t) = Taken By, (c) = Cosigned By      Initials Name Provider Type    Dania Barrera OT Occupational Therapist                   Obj/Interventions       Row Name 08/29/24 1548          Sensory Assessment (Somatosensory)    Sensory Assessment (Somatosensory) left UE  -AR     Sensory Subjective Reports numbness  -AR       Row Name 08/29/24 1548          Vision Assessment/Intervention    Visual Impairment/Limitations WNL  -AR       Row Name 08/29/24 1548          Range of Motion Comprehensive    Comment, General Range of Motion RUE WNL, L elbow/wrist/hand WFL  -AR       Row Name 08/29/24 1548          Strength Comprehensive (MMT)    Comment, General Manual Muscle Testing (MMT) Assessment RUE WNL, LUE deferred  -AR       Row Name 08/29/24 1548          Elbow/Forearm (Therapeutic Exercise)    Elbow/Forearm (Therapeutic Exercise) AROM (active range of motion)  -AR     Elbow/Forearm AROM (Therapeutic Exercise) left;flexion;extension;supination;pronation;sitting;10 repetitions  -AR       Row Name 08/29/24 1548          Wrist (Therapeutic Exercise)    Wrist (Therapeutic Exercise) AROM (active range of motion)  -AR     Wrist AROM (Therapeutic Exercise) left;flexion;extension;10 repetitions  -AR       Row Name 08/29/24 1548          Hand (Therapeutic Exercise)    Hand (Therapeutic Exercise) AROM (active range of motion)  -AR     Hand AROM/AAROM (Therapeutic Exercise) left;AROM (active range of motion);finger flexion;finger extension;10 repetitions  -AR       Row Name 08/29/24 1548          Motor Skills    Therapeutic Exercise elbow/forearm;wrist;hand  Issued and reviewed writtem HEP  -AR       Row Name 08/29/24 1548          Balance    Balance Assessment sitting static balance;sitting dynamic balance;standing dynamic balance;standing static balance  -AR     Static Sitting Balance independent  -AR     Dynamic Sitting  Balance independent  -AR     Position, Sitting Balance unsupported;sitting edge of bed  -AR     Static Standing Balance contact guard  -AR     Dynamic Standing Balance minimal assist;contact guard  -AR     Position/Device Used, Standing Balance unsupported  -AR               User Key  (r) = Recorded By, (t) = Taken By, (c) = Cosigned By      Initials Name Provider Type    Dania Barrera OT Occupational Therapist                   Goals/Plan       Row Name 08/29/24 1554          Transfer Goal 1 (OT)    Activity/Assistive Device (Transfer Goal 1, OT) sit-to-stand/stand-to-sit;toilet  -AR     [place] Level/Cues Needed (Transfer Goal 1, OT) supervision required;verbal cues required  -AR     Time Frame (Transfer Goal 1, OT) long term goal (LTG);2 days  -AR     Progress/Outcome (Transfer Goal 1, OT) goal ongoing  -AR       Row Name 08/29/24 1554          Dressing Goal 1 (OT)    Time Frame (Dressing Goal 1, OT) short term goal (STG);1 day  -AR     Strategies/Barriers (Dressing Goal 1, OT) Pt and/or family will don/doff sling with CGA  -AR     Progress/Outcome (Dressing Goal 1, OT) goal met  -AR       Row Name 08/29/24 1554          ROM Goal 1 (OT)    Time Frame (ROM Goal 1, OT) short term goal (STG);1 day  -AR     Strategies/Barriers (ROM Goal 1, OT) Pt and/or family will complete UE HEP within physician parameters with supervison  -AR     Progress/Outcome (ROM Goal 1, OT) goal met  -AR       Row Name 08/29/24 1554          Therapy Assessment/Plan (OT)    Planned Therapy Interventions (OT) BADL retraining;edema control/reduction;IADL retraining;occupation/activity based interventions;orthotic fabrication/fitting/training;patient/caregiver education/training;ROM/therapeutic exercise;transfer/mobility retraining  -AR               User Key  (r) = Recorded By, (t) = Taken By, (c) = Cosigned By      Initials Name Provider Type    Dania Barrera, OT Occupational Therapist                   Clinical  Impression       Row Name 08/29/24 1550          Pain Assessment    Pretreatment Pain Rating 7/10  -AR     Posttreatment Pain Rating 5/10  -AR     Pain Location - Side/Orientation Left  -AR     Pain Location - --  axilla  -AR     Pain Intervention(s) Medication (See MAR);Repositioned;Ambulation/increased activity  -AR       Row Name 08/29/24 1550          Plan of Care Review    Plan of Care Reviewed With patient;significant other  -AR     Outcome Evaluation OT educated pt and family on shoulder precautions, ADL retraining to maintain, sling management and HEP. Pt able to complete HEP with supervision and SO able to manage brace with CGA. Pt ambulated 200 feet with CGA and passed mobility screen, however he demo mild lateral LOB sways that improved with RUE HHA. Recommend PT eval for possible AD as he is agreeable to use one if recommended. Recommend DC home with initial 24/7 assist from SO.  -AR       Row Name 08/29/24 1550          Therapy Assessment/Plan (OT)    Rehab Potential (OT) good, to achieve stated therapy goals  -AR     Criteria for Skilled Therapeutic Interventions Met (OT) yes  -AR     Therapy Frequency (OT) daily  -AR     Predicted Duration of Therapy Intervention (OT) 2 days  -AR       Row Name 08/29/24 1550          Therapy Plan Review/Discharge Plan (OT)    Anticipated Discharge Disposition (OT) home with 24/7 care  -AR       Row Name 08/29/24 1550          Vital Signs    Pre SpO2 (%) 96  -AR     O2 Delivery Pre Treatment room air  -AR     Intra SpO2 (%) 91  -AR     O2 Delivery Intra Treatment room air  -AR     Post SpO2 (%) 95  -AR     O2 Delivery Post Treatment room air  -AR     Pre Patient Position Supine  -AR     Intra Patient Position Standing  -AR     Post Patient Position Sitting  -AR       Row Name 08/29/24 1550          Positioning and Restraints    Pre-Treatment Position in bed  -AR     Post Treatment Position bed  -AR     In Bed sitting EOB;with PT;with nsg;with family/caregiver;with  brace  -AR               User Key  (r) = Recorded By, (t) = Taken By, (c) = Cosigned By      Initials Name Provider Type    Dania Barrera OT Occupational Therapist                   Outcome Measures       Row Name 08/29/24 1558          How much help from another is currently needed...    Putting on and taking off regular lower body clothing? 2  -AR     Bathing (including washing, rinsing, and drying) 3  -AR     Toileting (which includes using toilet bed pan or urinal) 3  -AR     Putting on and taking off regular upper body clothing 2  -AR     Taking care of personal grooming (such as brushing teeth) 3  -AR     Eating meals 3  -AR     AM-PAC 6 Clicks Score (OT) 16  -AR       Row Name 08/29/24 1558          Functional Assessment    Outcome Measure Options AM-PAC 6 Clicks Daily Activity (OT)  -AR               User Key  (r) = Recorded By, (t) = Taken By, (c) = Cosigned By      Initials Name Provider Type    Dania Barrera OT Occupational Therapist                    Occupational Therapy Education       Title: PT OT SLP Therapies (Done)       Topic: Occupational Therapy (Done)       Point: ADL training (Done)       Description:   Instruct learner(s) on proper safety adaptation and remediation techniques during self care or transfers.   Instruct in proper use of assistive devices.                  Learning Progress Summary             Patient Eager, E,TB,D,H, VU,DU by AR at 8/29/2024 1559   Family Eager, E,TB,D,H, VU,DU by AR at 8/29/2024 1559                         Point: Home exercise program (Done)       Description:   Instruct learner(s) on appropriate technique for monitoring, assisting and/or progressing therapeutic exercises/activities.                  Learning Progress Summary             Patient Eager, E,TB,D,H, VU,DU by AR at 8/29/2024 1559   Family Eager, E,TB,D,H, VU,DU by AR at 8/29/2024 1559                         Point: Precautions (Done)       Description:   Instruct learner(s) on  prescribed precautions during self-care and functional transfers.                  Learning Progress Summary             Patient Eager, E,TB,D,H, VU,DU by AR at 8/29/2024 1559   Family Eager, E,TB,D,H, VU,DU by AR at 8/29/2024 1559                         Point: Body mechanics (Done)       Description:   Instruct learner(s) on proper positioning and spine alignment during self-care, functional mobility activities and/or exercises.                  Learning Progress Summary             Patient Eager, E,TB,D,H, VU,DU by AR at 8/29/2024 1559   Family Eager, E,TB,D,H, VU,DU by AR at 8/29/2024 1559                                         User Key       Initials Effective Dates Name Provider Type Discipline    AR 07/11/23 -  Dania Boone, OT Occupational Therapist OT                  OT Recommendation and Plan  Planned Therapy Interventions (OT): BADL retraining, edema control/reduction, IADL retraining, occupation/activity based interventions, orthotic fabrication/fitting/training, patient/caregiver education/training, ROM/therapeutic exercise, transfer/mobility retraining  Therapy Frequency (OT): daily  Plan of Care Review  Plan of Care Reviewed With: patient, significant other  Outcome Evaluation: OT educated pt and family on shoulder precautions, ADL retraining to maintain, sling management and HEP. Pt able to complete HEP with supervision and SO able to manage brace with CGA. Pt ambulated 200 feet with CGA and passed mobility screen, however he demo mild lateral LOB sways that improved with RUE HHA. Recommend PT eval for possible AD as he is agreeable to use one if recommended. Recommend DC home with initial 24/7 assist from SO.     Time Calculation:   Evaluation Complexity (OT)  Review Occupational Profile/Medical/Therapy History Complexity: brief/low complexity  Assessment, Occupational Performance/Identification of Deficit Complexity: 1-3 performance deficits  Clinical Decision Making Complexity (OT):  problem focused assessment/low complexity  Overall Complexity of Evaluation (OT): low complexity     Time Calculation- OT       Row Name 08/29/24 1559             Time Calculation- OT    OT Start Time 1425  -AR      OT Received On 08/29/24  -AR      OT Goal Re-Cert Due Date 09/08/24  -AR         Timed Charges    75594 - OT Therapeutic Exercise Minutes 10  -AR      31408 - OT Therapeutic Activity Minutes 10  -AR      62379 - OT Self Care/Mgmt Minutes 19  -AR         Untimed Charges    OT Eval/Re-eval Minutes 44  -AR         Total Minutes    Timed Charges Total Minutes 39  -AR      Untimed Charges Total Minutes 44  -AR       Total Minutes 83  -AR                User Key  (r) = Recorded By, (t) = Taken By, (c) = Cosigned By      Initials Name Provider Type    AR Dania Boone OT Occupational Therapist                  Therapy Charges for Today       Code Description Service Date Service Provider Modifiers Qty    25020997716 HC OT THER PROC EA 15 MIN 8/29/2024 Dania Boone, OT GO 1    53480555148 HC OT THERAPEUTIC ACT EA 15 MIN 8/29/2024 Dania Boone, OT GO 1    75674181538 HC OT SELF CARE/MGMT/TRAIN EA 15 MIN 8/29/2024 Dania Boone, OT GO 1    61692200939 HC OT EVAL LOW COMPLEXITY 3 8/29/2024 Dania Boone, OT GO 1    47749083755 HC CAREGIVER TRAINING STRATEGIES & TQ 1ST 30 MINUTES 8/29/2024 Dania Boone, OT  1    56462044998 HC CAREGIVER TRAINING STRATEGIES &TQ EA ADDL 15 MIN 8/29/2024 Dania Boone, OT  1    14346820095 HC OT THER SUPP EA 15 MIN 8/29/2024 Dania Boone, OT GO 3                 Dania Boone, OT  8/29/2024

## 2024-08-29 NOTE — H&P
Pre-Op H&P  Nolberto Jackson Jr.  [id number]  1955      Chief complaint: Left shoulder pain      Subjective:  Patient is a 69 y.o.male presents for scheduled surgery by Dr. Fang. He anticipates a LEFT TOTAL SHOULDER ARTHROPLASTY  today. His shoulder has been painful with limited ROM for several years. He denies numbness, tingling or difficulty with . He tried cortisone injections without benefit.      Review of Systems:  Constitutional-- No fever, chills or sweats. No fatigue.  CV-- No chest pain, palpitation or syncope. +HTN, HLD  Resp-- No SOB, cough, hemoptysis  Skin--No rashes or lesions      Allergies:   Allergies   Allergen Reactions    Shellfish Allergy Unknown - Low Severity    Diphenhydramine Hcl Delirium    Midazolam Other (See Comments)     Other reaction(s): N+V - Nausea and vomiting, N+V - Nausea and vomiting    Zolpidem Delirium     Other reaction(s): nightmares, nightmares         Home Meds:  Medications Prior to Admission   Medication Sig Dispense Refill Last Dose    allopurinol (ZYLOPRIM) 100 MG tablet Take 1 tablet by mouth Daily.       amLODIPine (NORVASC) 10 MG tablet Take 1 tablet by mouth Daily.       benzoyl peroxide 5 % external wash Use as directed by Dr. Fang 142 g 0     carvedilol (COREG) 3.125 MG tablet Take 1 tablet by mouth 2 (Two) Times a Day.       mupirocin (BACTROBAN) 2 % ointment Apply 1 application into each nostril as directed by provider 2 (Two) Times a Day beginning 5 days prior to surgery. 22 g 0     ondansetron (ZOFRAN) 4 MG tablet Take 1 tablet by mouth Every 8 (Eight) Hours As Needed for post-op nausea. 30 tablet 0     pravastatin (PRAVACHOL) 40 MG tablet Take 1 tablet by mouth Daily.       ropivacaine (NAROPIN) 0.2 % infusion (INFUSYSTEM) 2 mg/hr by Peripheral Nerve route Continuous.            PMH:   Past Medical History:   Diagnosis Date    Colon polyp     HX OF    Dialysis patient     NO DILAYSIS SINCE 2009    Fistula     LEFT ARM, DOES NOT WORK     Gout     Hard of hearing     Hearing aid worn     RIGHT EAR    History of hepatitis C     treated 2009    History of kidney cancer 2017    LEFT KIDNEY    History of transfusion     PATIENT DENIES REACTION    Hyperlipidemia     Hypertension     Liver cirrhosis     MRSA infection     D/T COLON CANCER SURGERY    Wears dentures     UPPER AND LOWER     PSH:    Past Surgical History:   Procedure Laterality Date    COLECTOMY PARTIAL / TOTAL Left     , 2006    COLONOSCOPY      NEPHRECTOMY Left     TOTAL SHOULDER ARTHROPLASTY Right 2024    Procedure: TOTAL SHOULDER ARTHROPLASTY - RIGHT LEFT SHOULDER STEROID INJECTION;  Surgeon: Jhon Fang MD;  Location: [facility];  Service: Orthopedics;  Laterality: Right;    URETERECTOMY Left     cancer       Immunization History:  Influenza: UTD  Pneumococcal: UTD  Tetanus: UTD    Social History:   Tobacco:   Social History     Tobacco Use   Smoking Status Former    Current packs/day: 0.00    Types: Cigarettes    Quit date:     Years since quittin.6   Smokeless Tobacco Never      Alcohol:     Social History     Substance and Sexual Activity   Alcohol Use Not Currently         Physical Exam: VS: /102  HR 70  RR 16  T 98.2  Sat 98%RA      General Appearance:    Alert, cooperative, no distress, appears stated age   Head:    Normocephalic, without obvious abnormality, atraumatic   Lungs:     Clear to auscultation bilaterally, respirations unlabored    Heart:   Regular rate and rhythm, S1 and S2 normal    Abdomen:    Soft without tenderness   Extremities:   Extremities normal, atraumatic, no cyanosis or edema   Skin:   Skin color, texture, turgor normal, no rashes or lesions   Neurologic:   Grossly intact     Results Review:     LABS:  Lab Results   Component Value Date    WBC 6.69 2024    HGB 13.6 2024    HCT 41.9 2024    MCV 91.5 2024     2024    NEUTROABS 4.77 2024    GLUCOSE 94 2024    BUN 41 (H)  08/22/2024    CREATININE 3.31 (H) 08/22/2024    EGFRIFNONA 26 (L) 10/22/2019     08/22/2024    K 5.2 08/22/2024     08/22/2024    CO2 20.0 (L) 08/22/2024    PHOS 2.7 04/24/2018    CALCIUM 9.4 08/22/2024    ALBUMIN 4.6 08/22/2024    AST 20 08/22/2024    ALT 13 08/22/2024    BILITOT 0.4 08/22/2024       RADIOLOGY:  Imaging Results (Last 72 Hours)       ** No results found for the last 72 hours. **            I reviewed the patient's new clinical results.    Cancer Staging (if applicable)  Cancer Patient: __ yes __no __unknown; If yes, clinical stage T:__ N:__M:__, stage group or __N/A      Impression: Left shoulder pain      Plan: LEFT TOTAL SHOULDER ARTHROPLASTY       RAYNE Valderrama   8/29/2024   08:22 EDT

## 2024-09-05 LAB
QT INTERVAL: 380 MS
QTC INTERVAL: 388 MS

## 2024-09-12 ENCOUNTER — LAB (OUTPATIENT)
Dept: LAB | Facility: HOSPITAL | Age: 69
End: 2024-09-12
Payer: MEDICARE

## 2024-09-12 ENCOUNTER — TRANSCRIBE ORDERS (OUTPATIENT)
Dept: LAB | Facility: HOSPITAL | Age: 69
End: 2024-09-12
Payer: MEDICARE

## 2024-09-12 DIAGNOSIS — B18.2 CHRONIC HEPATITIS C WITH HEPATIC COMA: ICD-10-CM

## 2024-09-12 DIAGNOSIS — K74.02 STAGE 3 HEPATIC FIBROSIS: Primary | ICD-10-CM

## 2024-09-12 DIAGNOSIS — N18.4 CHRONIC KIDNEY DISEASE, STAGE IV (SEVERE): ICD-10-CM

## 2024-09-12 DIAGNOSIS — K74.02 STAGE 3 HEPATIC FIBROSIS: ICD-10-CM

## 2024-09-12 LAB
ALBUMIN SERPL-MCNC: 4.1 G/DL (ref 3.5–5.2)
ALBUMIN/GLOB SERPL: 1.3 G/DL
ALP SERPL-CCNC: 104 U/L (ref 39–117)
ALPHA-FETOPROTEIN: <2 NG/ML (ref 0–8.3)
ALT SERPL W P-5'-P-CCNC: 10 U/L (ref 1–41)
ANION GAP SERPL CALCULATED.3IONS-SCNC: 10 MMOL/L (ref 5–15)
AST SERPL-CCNC: 14 U/L (ref 1–40)
BASOPHILS # BLD AUTO: 0.03 10*3/MM3 (ref 0–0.2)
BASOPHILS NFR BLD AUTO: 0.5 % (ref 0–1.5)
BILIRUB SERPL-MCNC: 0.3 MG/DL (ref 0–1.2)
BUN SERPL-MCNC: 43 MG/DL (ref 8–23)
BUN/CREAT SERPL: 12.6 (ref 7–25)
CALCIUM SPEC-SCNC: 9.1 MG/DL (ref 8.6–10.5)
CHLORIDE SERPL-SCNC: 105 MMOL/L (ref 98–107)
CO2 SERPL-SCNC: 24 MMOL/L (ref 22–29)
CREAT SERPL-MCNC: 3.42 MG/DL (ref 0.76–1.27)
DEPRECATED RDW RBC AUTO: 43.5 FL (ref 37–54)
EGFRCR SERPLBLD CKD-EPI 2021: 18.9 ML/MIN/1.73
EOSINOPHIL # BLD AUTO: 0.13 10*3/MM3 (ref 0–0.4)
EOSINOPHIL NFR BLD AUTO: 2.1 % (ref 0.3–6.2)
ERYTHROCYTE [DISTWIDTH] IN BLOOD BY AUTOMATED COUNT: 13 % (ref 12.3–15.4)
GLOBULIN UR ELPH-MCNC: 3.2 GM/DL
GLUCOSE SERPL-MCNC: 100 MG/DL (ref 65–99)
HCT VFR BLD AUTO: 34.6 % (ref 37.5–51)
HGB BLD-MCNC: 11.2 G/DL (ref 13–17.7)
IMM GRANULOCYTES # BLD AUTO: 0.02 10*3/MM3 (ref 0–0.05)
IMM GRANULOCYTES NFR BLD AUTO: 0.3 % (ref 0–0.5)
LYMPHOCYTES # BLD AUTO: 0.86 10*3/MM3 (ref 0.7–3.1)
LYMPHOCYTES NFR BLD AUTO: 14.2 % (ref 19.6–45.3)
MCH RBC QN AUTO: 29.7 PG (ref 26.6–33)
MCHC RBC AUTO-ENTMCNC: 32.4 G/DL (ref 31.5–35.7)
MCV RBC AUTO: 91.8 FL (ref 79–97)
MONOCYTES # BLD AUTO: 0.63 10*3/MM3 (ref 0.1–0.9)
MONOCYTES NFR BLD AUTO: 10.4 % (ref 5–12)
NEUTROPHILS NFR BLD AUTO: 4.39 10*3/MM3 (ref 1.7–7)
NEUTROPHILS NFR BLD AUTO: 72.5 % (ref 42.7–76)
NRBC BLD AUTO-RTO: 0 /100 WBC (ref 0–0.2)
PLATELET # BLD AUTO: 142 10*3/MM3 (ref 140–450)
PMV BLD AUTO: 9.8 FL (ref 6–12)
POTASSIUM SERPL-SCNC: 5.1 MMOL/L (ref 3.5–5.2)
PROT SERPL-MCNC: 7.3 G/DL (ref 6–8.5)
RBC # BLD AUTO: 3.77 10*6/MM3 (ref 4.14–5.8)
SODIUM SERPL-SCNC: 139 MMOL/L (ref 136–145)
WBC NRBC COR # BLD AUTO: 6.06 10*3/MM3 (ref 3.4–10.8)

## 2024-09-12 PROCEDURE — 82105 ALPHA-FETOPROTEIN SERUM: CPT

## 2024-09-12 PROCEDURE — 80053 COMPREHEN METABOLIC PANEL: CPT

## 2024-09-12 PROCEDURE — 85025 COMPLETE CBC W/AUTO DIFF WBC: CPT

## 2024-09-12 PROCEDURE — 36415 COLL VENOUS BLD VENIPUNCTURE: CPT

## 2024-12-02 ENCOUNTER — TRANSCRIBE ORDERS (OUTPATIENT)
Dept: ADMINISTRATIVE | Facility: HOSPITAL | Age: 69
End: 2024-12-02
Payer: MEDICARE

## 2024-12-02 DIAGNOSIS — K74.02 STAGE 3 HEPATIC FIBROSIS: Primary | ICD-10-CM

## 2024-12-14 PROCEDURE — 99285 EMERGENCY DEPT VISIT HI MDM: CPT

## 2024-12-14 RX ORDER — SODIUM CHLORIDE 9 MG/ML
10 INJECTION, SOLUTION INTRAMUSCULAR; INTRAVENOUS; SUBCUTANEOUS AS NEEDED
Status: DISCONTINUED | OUTPATIENT
Start: 2024-12-14 | End: 2024-12-17 | Stop reason: HOSPADM

## 2024-12-15 ENCOUNTER — APPOINTMENT (OUTPATIENT)
Dept: CT IMAGING | Facility: HOSPITAL | Age: 69
End: 2024-12-15
Payer: MEDICARE

## 2024-12-15 ENCOUNTER — HOSPITAL ENCOUNTER (INPATIENT)
Facility: HOSPITAL | Age: 69
LOS: 2 days | Discharge: HOME | End: 2024-12-17
Attending: EMERGENCY MEDICINE | Admitting: PEDIATRICS
Payer: MEDICARE

## 2024-12-15 ENCOUNTER — APPOINTMENT (OUTPATIENT)
Dept: GENERAL RADIOLOGY | Facility: HOSPITAL | Age: 69
End: 2024-12-15
Payer: MEDICARE

## 2024-12-15 DIAGNOSIS — R19.7 DIARRHEA, UNSPECIFIED TYPE: ICD-10-CM

## 2024-12-15 DIAGNOSIS — N17.9 SEPSIS WITH ACUTE RENAL FAILURE, DUE TO UNSPECIFIED ORGANISM, UNSPECIFIED ACUTE RENAL FAILURE TYPE, UNSPECIFIED WHETHER SEPTIC SHOCK PRESENT: Primary | ICD-10-CM

## 2024-12-15 DIAGNOSIS — N17.9 ACUTE RENAL FAILURE SUPERIMPOSED ON STAGE 3 CHRONIC KIDNEY DISEASE, UNSPECIFIED ACUTE RENAL FAILURE TYPE, UNSPECIFIED WHETHER STAGE 3A OR 3B CKD: ICD-10-CM

## 2024-12-15 DIAGNOSIS — R65.20 SEPSIS WITH ACUTE RENAL FAILURE, DUE TO UNSPECIFIED ORGANISM, UNSPECIFIED ACUTE RENAL FAILURE TYPE, UNSPECIFIED WHETHER SEPTIC SHOCK PRESENT: Primary | ICD-10-CM

## 2024-12-15 DIAGNOSIS — R74.8 ELEVATED LIPASE: ICD-10-CM

## 2024-12-15 DIAGNOSIS — A41.9 SEPSIS WITH ACUTE RENAL FAILURE, DUE TO UNSPECIFIED ORGANISM, UNSPECIFIED ACUTE RENAL FAILURE TYPE, UNSPECIFIED WHETHER SEPTIC SHOCK PRESENT: Primary | ICD-10-CM

## 2024-12-15 DIAGNOSIS — R10.31 RIGHT LOWER QUADRANT ABDOMINAL PAIN: ICD-10-CM

## 2024-12-15 DIAGNOSIS — N18.30 ACUTE RENAL FAILURE SUPERIMPOSED ON STAGE 3 CHRONIC KIDNEY DISEASE, UNSPECIFIED ACUTE RENAL FAILURE TYPE, UNSPECIFIED WHETHER STAGE 3A OR 3B CKD: ICD-10-CM

## 2024-12-15 DIAGNOSIS — Z86.79 HISTORY OF HYPERTENSION: ICD-10-CM

## 2024-12-15 DIAGNOSIS — Z85.038 HISTORY OF COLON CANCER: ICD-10-CM

## 2024-12-15 DIAGNOSIS — Z85.528 HISTORY OF RENAL CELL CANCER: ICD-10-CM

## 2024-12-15 DIAGNOSIS — R11.0 NAUSEA: ICD-10-CM

## 2024-12-15 PROBLEM — N18.9 ACUTE KIDNEY INJURY SUPERIMPOSED ON CHRONIC KIDNEY DISEASE: Status: ACTIVE | Noted: 2024-12-15

## 2024-12-15 PROBLEM — M19.011 GLENOHUMERAL ARTHRITIS, RIGHT: Status: RESOLVED | Noted: 2024-02-01 | Resolved: 2024-12-15

## 2024-12-15 PROBLEM — K56.600 PARTIAL SMALL BOWEL OBSTRUCTION: Status: ACTIVE | Noted: 2024-12-15

## 2024-12-15 PROBLEM — K85.90 PANCREATITIS: Status: ACTIVE | Noted: 2024-12-15

## 2024-12-15 PROBLEM — N18.4 CKD (CHRONIC KIDNEY DISEASE) STAGE 4, GFR 15-29 ML/MIN: Status: ACTIVE | Noted: 2024-12-15

## 2024-12-15 PROBLEM — R65.10 SIRS (SYSTEMIC INFLAMMATORY RESPONSE SYNDROME): Status: ACTIVE | Noted: 2024-12-15

## 2024-12-15 PROBLEM — Z96.611 STATUS POST TOTAL SHOULDER ARTHROPLASTY, RIGHT: Status: RESOLVED | Noted: 2024-02-01 | Resolved: 2024-12-15

## 2024-12-15 LAB
ADV 40+41 DNA STL QL NAA+NON-PROBE: NOT DETECTED
ALBUMIN SERPL-MCNC: 3.5 G/DL (ref 3.5–5.2)
ALBUMIN SERPL-MCNC: 3.8 G/DL (ref 3.5–5.2)
ALBUMIN/GLOB SERPL: 1 G/DL
ALBUMIN/GLOB SERPL: 1.3 G/DL
ALP SERPL-CCNC: 119 U/L (ref 39–117)
ALP SERPL-CCNC: 143 U/L (ref 39–117)
ALT SERPL W P-5'-P-CCNC: 13 U/L (ref 1–41)
ALT SERPL W P-5'-P-CCNC: 15 U/L (ref 1–41)
ANION GAP SERPL CALCULATED.3IONS-SCNC: 13 MMOL/L (ref 5–15)
ANION GAP SERPL CALCULATED.3IONS-SCNC: 16 MMOL/L (ref 5–15)
AST SERPL-CCNC: 14 U/L (ref 1–40)
AST SERPL-CCNC: 17 U/L (ref 1–40)
ASTRO TYP 1-8 RNA STL QL NAA+NON-PROBE: NOT DETECTED
BACTERIA UR QL AUTO: ABNORMAL /HPF
BASOPHILS # BLD AUTO: 0.05 10*3/MM3 (ref 0–0.2)
BASOPHILS # BLD AUTO: 0.12 10*3/MM3 (ref 0–0.2)
BASOPHILS NFR BLD AUTO: 0.4 % (ref 0–1.5)
BASOPHILS NFR BLD AUTO: 0.4 % (ref 0–1.5)
BILIRUB SERPL-MCNC: 0.2 MG/DL (ref 0–1.2)
BILIRUB SERPL-MCNC: 0.3 MG/DL (ref 0–1.2)
BILIRUB UR QL STRIP: NEGATIVE
BUN SERPL-MCNC: 75 MG/DL (ref 8–23)
BUN SERPL-MCNC: 83 MG/DL (ref 8–23)
BUN/CREAT SERPL: 19 (ref 7–25)
BUN/CREAT SERPL: 20 (ref 7–25)
C CAYETANENSIS DNA STL QL NAA+NON-PROBE: NOT DETECTED
C COLI+JEJ+UPSA DNA STL QL NAA+NON-PROBE: NOT DETECTED
C DIFF TOX GENS STL QL NAA+PROBE: NOT DETECTED
CALCIUM SPEC-SCNC: 8.4 MG/DL (ref 8.6–10.5)
CALCIUM SPEC-SCNC: 9.7 MG/DL (ref 8.6–10.5)
CHLORIDE SERPL-SCNC: 111 MMOL/L (ref 98–107)
CHLORIDE SERPL-SCNC: 115 MMOL/L (ref 98–107)
CHOLEST SERPL-MCNC: 102 MG/DL (ref 0–200)
CLARITY UR: CLEAR
CO2 SERPL-SCNC: 11 MMOL/L (ref 22–29)
CO2 SERPL-SCNC: 12 MMOL/L (ref 22–29)
COLOR UR: YELLOW
CREAT SERPL-MCNC: 3.95 MG/DL (ref 0.76–1.27)
CREAT SERPL-MCNC: 4.15 MG/DL (ref 0.76–1.27)
CRP SERPL-MCNC: 3.34 MG/DL (ref 0–0.5)
CRYPTOSP DNA STL QL NAA+NON-PROBE: NOT DETECTED
D-LACTATE SERPL-SCNC: 1 MMOL/L (ref 0.5–2)
DEPRECATED RDW RBC AUTO: 45.5 FL (ref 37–54)
DEPRECATED RDW RBC AUTO: 47.9 FL (ref 37–54)
E HISTOLYT DNA STL QL NAA+NON-PROBE: NOT DETECTED
EAEC PAA PLAS AGGR+AATA ST NAA+NON-PRB: NOT DETECTED
EC STX1+STX2 GENES STL QL NAA+NON-PROBE: NOT DETECTED
EGFRCR SERPLBLD CKD-EPI 2021: 15 ML/MIN/1.73
EGFRCR SERPLBLD CKD-EPI 2021: 15.9 ML/MIN/1.73
EOSINOPHIL # BLD AUTO: 0.28 10*3/MM3 (ref 0–0.4)
EOSINOPHIL # BLD AUTO: 1 10*3/MM3 (ref 0–0.4)
EOSINOPHIL NFR BLD AUTO: 2.2 % (ref 0.3–6.2)
EOSINOPHIL NFR BLD AUTO: 3.2 % (ref 0.3–6.2)
EPEC EAE GENE STL QL NAA+NON-PROBE: NOT DETECTED
ERYTHROCYTE [DISTWIDTH] IN BLOOD BY AUTOMATED COUNT: 13.9 % (ref 12.3–15.4)
ERYTHROCYTE [DISTWIDTH] IN BLOOD BY AUTOMATED COUNT: 14.1 % (ref 12.3–15.4)
ERYTHROCYTE [SEDIMENTATION RATE] IN BLOOD: 68 MM/HR (ref 0–20)
ETEC LTA+ST1A+ST1B TOX ST NAA+NON-PROBE: NOT DETECTED
G LAMBLIA DNA STL QL NAA+NON-PROBE: NOT DETECTED
GLOBULIN UR ELPH-MCNC: 2.6 GM/DL
GLOBULIN UR ELPH-MCNC: 3.7 GM/DL
GLUCOSE BLDC GLUCOMTR-MCNC: 109 MG/DL (ref 70–130)
GLUCOSE SERPL-MCNC: 115 MG/DL (ref 65–99)
GLUCOSE SERPL-MCNC: 115 MG/DL (ref 65–99)
GLUCOSE UR STRIP-MCNC: NEGATIVE MG/DL
GRAN CASTS URNS QL MICRO: ABNORMAL /LPF
HCT VFR BLD AUTO: 33.3 % (ref 37.5–51)
HCT VFR BLD AUTO: 42.3 % (ref 37.5–51)
HDLC SERPL-MCNC: 21 MG/DL (ref 40–60)
HGB BLD-MCNC: 10.4 G/DL (ref 13–17.7)
HGB BLD-MCNC: 13.8 G/DL (ref 13–17.7)
HGB UR QL STRIP.AUTO: ABNORMAL
HOLD SPECIMEN: NORMAL
HYALINE CASTS UR QL AUTO: ABNORMAL /LPF
IMM GRANULOCYTES # BLD AUTO: 0.1 10*3/MM3 (ref 0–0.05)
IMM GRANULOCYTES # BLD AUTO: 0.31 10*3/MM3 (ref 0–0.05)
IMM GRANULOCYTES NFR BLD AUTO: 0.8 % (ref 0–0.5)
IMM GRANULOCYTES NFR BLD AUTO: 1 % (ref 0–0.5)
KETONES UR QL STRIP: NEGATIVE
LDLC SERPL CALC-MCNC: 57 MG/DL (ref 0–100)
LDLC/HDLC SERPL: 2.62 {RATIO}
LEUKOCYTE ESTERASE UR QL STRIP.AUTO: ABNORMAL
LIPASE SERPL-CCNC: 557 U/L (ref 13–60)
LIPASE SERPL-CCNC: 857 U/L (ref 13–60)
LYMPHOCYTES # BLD AUTO: 0.31 10*3/MM3 (ref 0.7–3.1)
LYMPHOCYTES # BLD AUTO: 1.84 10*3/MM3 (ref 0.7–3.1)
LYMPHOCYTES NFR BLD AUTO: 2.4 % (ref 19.6–45.3)
LYMPHOCYTES NFR BLD AUTO: 5.9 % (ref 19.6–45.3)
MAGNESIUM SERPL-MCNC: 2 MG/DL (ref 1.6–2.4)
MCH RBC QN AUTO: 28.9 PG (ref 26.6–33)
MCH RBC QN AUTO: 29.2 PG (ref 26.6–33)
MCHC RBC AUTO-ENTMCNC: 31.2 G/DL (ref 31.5–35.7)
MCHC RBC AUTO-ENTMCNC: 32.6 G/DL (ref 31.5–35.7)
MCV RBC AUTO: 89.6 FL (ref 79–97)
MCV RBC AUTO: 92.5 FL (ref 79–97)
MONOCYTES # BLD AUTO: 0.53 10*3/MM3 (ref 0.1–0.9)
MONOCYTES # BLD AUTO: 1.4 10*3/MM3 (ref 0.1–0.9)
MONOCYTES NFR BLD AUTO: 4.1 % (ref 5–12)
MONOCYTES NFR BLD AUTO: 4.5 % (ref 5–12)
NEUTROPHILS NFR BLD AUTO: 11.74 10*3/MM3 (ref 1.7–7)
NEUTROPHILS NFR BLD AUTO: 26.32 10*3/MM3 (ref 1.7–7)
NEUTROPHILS NFR BLD AUTO: 85 % (ref 42.7–76)
NEUTROPHILS NFR BLD AUTO: 90.1 % (ref 42.7–76)
NITRITE UR QL STRIP: NEGATIVE
NOROVIRUS GI+II RNA STL QL NAA+NON-PROBE: NOT DETECTED
NRBC BLD AUTO-RTO: 0 /100 WBC (ref 0–0.2)
NRBC BLD AUTO-RTO: 0 /100 WBC (ref 0–0.2)
P SHIGELLOIDES DNA STL QL NAA+NON-PROBE: NOT DETECTED
PH UR STRIP.AUTO: <=5 [PH] (ref 5–8)
PLAT MORPH BLD: NORMAL
PLATELET # BLD AUTO: 130 10*3/MM3 (ref 140–450)
PLATELET # BLD AUTO: 274 10*3/MM3 (ref 140–450)
PMV BLD AUTO: 10.1 FL (ref 6–12)
PMV BLD AUTO: 9.8 FL (ref 6–12)
POTASSIUM SERPL-SCNC: 4.3 MMOL/L (ref 3.5–5.2)
POTASSIUM SERPL-SCNC: 4.7 MMOL/L (ref 3.5–5.2)
PROT SERPL-MCNC: 6.1 G/DL (ref 6–8.5)
PROT SERPL-MCNC: 7.5 G/DL (ref 6–8.5)
PROT UR QL STRIP: ABNORMAL
RBC # BLD AUTO: 3.6 10*6/MM3 (ref 4.14–5.8)
RBC # BLD AUTO: 4.72 10*6/MM3 (ref 4.14–5.8)
RBC # UR STRIP: ABNORMAL /HPF
RBC MORPH BLD: NORMAL
REF LAB TEST METHOD: ABNORMAL
RVA RNA STL QL NAA+NON-PROBE: NOT DETECTED
S ENT+BONG DNA STL QL NAA+NON-PROBE: NOT DETECTED
SAPO I+II+IV+V RNA STL QL NAA+NON-PROBE: NOT DETECTED
SHIGELLA SP+EIEC IPAH ST NAA+NON-PROBE: NOT DETECTED
SODIUM SERPL-SCNC: 138 MMOL/L (ref 136–145)
SODIUM SERPL-SCNC: 140 MMOL/L (ref 136–145)
SP GR UR STRIP: 1.02 (ref 1–1.03)
SQUAMOUS #/AREA URNS HPF: ABNORMAL /HPF
TRIGL SERPL-MCNC: 130 MG/DL (ref 0–150)
UROBILINOGEN UR QL STRIP: ABNORMAL
V CHOL+PARA+VUL DNA STL QL NAA+NON-PROBE: NOT DETECTED
V CHOLERAE DNA STL QL NAA+NON-PROBE: NOT DETECTED
VLDLC SERPL-MCNC: 24 MG/DL (ref 5–40)
WBC # UR STRIP: ABNORMAL /HPF
WBC MORPH BLD: NORMAL
WBC NRBC COR # BLD AUTO: 13.01 10*3/MM3 (ref 3.4–10.8)
WBC NRBC COR # BLD AUTO: 30.99 10*3/MM3 (ref 3.4–10.8)
WHOLE BLOOD HOLD COAG: NORMAL
WHOLE BLOOD HOLD SPECIMEN: NORMAL
Y ENTEROCOL DNA STL QL NAA+NON-PROBE: NOT DETECTED

## 2024-12-15 PROCEDURE — 80061 LIPID PANEL: CPT | Performed by: NURSE PRACTITIONER

## 2024-12-15 PROCEDURE — 25010000002 VANCOMYCIN 1.5-0.9 GM/500ML-% SOLUTION: Performed by: PHYSICIAN ASSISTANT

## 2024-12-15 PROCEDURE — 80053 COMPREHEN METABOLIC PANEL: CPT | Performed by: EMERGENCY MEDICINE

## 2024-12-15 PROCEDURE — 85007 BL SMEAR W/DIFF WBC COUNT: CPT | Performed by: EMERGENCY MEDICINE

## 2024-12-15 PROCEDURE — 25010000002 PIPERACILLIN SOD-TAZOBACTAM PER 1 G: Performed by: PHYSICIAN ASSISTANT

## 2024-12-15 PROCEDURE — 85025 COMPLETE CBC W/AUTO DIFF WBC: CPT | Performed by: EMERGENCY MEDICINE

## 2024-12-15 PROCEDURE — 85652 RBC SED RATE AUTOMATED: CPT | Performed by: NURSE PRACTITIONER

## 2024-12-15 PROCEDURE — 87040 BLOOD CULTURE FOR BACTERIA: CPT | Performed by: PHYSICIAN ASSISTANT

## 2024-12-15 PROCEDURE — 25810000003 SEPSIS FLUID NS 0.9 % SOLUTION: Performed by: PHYSICIAN ASSISTANT

## 2024-12-15 PROCEDURE — 86140 C-REACTIVE PROTEIN: CPT | Performed by: NURSE PRACTITIONER

## 2024-12-15 PROCEDURE — 25010000002 PIPERACILLIN SOD-TAZOBACTAM PER 1 G: Performed by: NURSE PRACTITIONER

## 2024-12-15 PROCEDURE — 83690 ASSAY OF LIPASE: CPT | Performed by: EMERGENCY MEDICINE

## 2024-12-15 PROCEDURE — 81001 URINALYSIS AUTO W/SCOPE: CPT | Performed by: EMERGENCY MEDICINE

## 2024-12-15 PROCEDURE — 99222 1ST HOSP IP/OBS MODERATE 55: CPT | Performed by: NURSE PRACTITIONER

## 2024-12-15 PROCEDURE — 25010000002 HYDROMORPHONE PER 4 MG: Performed by: FAMILY MEDICINE

## 2024-12-15 PROCEDURE — 25010000002 ONDANSETRON PER 1 MG: Performed by: PHYSICIAN ASSISTANT

## 2024-12-15 PROCEDURE — 25010000002 METRONIDAZOLE 500 MG/100ML SOLUTION: Performed by: PHYSICIAN ASSISTANT

## 2024-12-15 PROCEDURE — 83605 ASSAY OF LACTIC ACID: CPT | Performed by: EMERGENCY MEDICINE

## 2024-12-15 PROCEDURE — 51798 US URINE CAPACITY MEASURE: CPT

## 2024-12-15 PROCEDURE — 80053 COMPREHEN METABOLIC PANEL: CPT | Performed by: NURSE PRACTITIONER

## 2024-12-15 PROCEDURE — 82948 REAGENT STRIP/BLOOD GLUCOSE: CPT

## 2024-12-15 PROCEDURE — 74176 CT ABD & PELVIS W/O CONTRAST: CPT

## 2024-12-15 PROCEDURE — 25810000003 SODIUM CHLORIDE 0.9 % SOLUTION: Performed by: NURSE PRACTITIONER

## 2024-12-15 PROCEDURE — 87493 C DIFF AMPLIFIED PROBE: CPT | Performed by: INTERNAL MEDICINE

## 2024-12-15 PROCEDURE — 85025 COMPLETE CBC W/AUTO DIFF WBC: CPT | Performed by: NURSE PRACTITIONER

## 2024-12-15 PROCEDURE — 25010000002 HYDROMORPHONE PER 4 MG: Performed by: EMERGENCY MEDICINE

## 2024-12-15 PROCEDURE — 25810000003 SODIUM CHLORIDE 0.9 % SOLUTION: Performed by: PHYSICIAN ASSISTANT

## 2024-12-15 PROCEDURE — 87507 IADNA-DNA/RNA PROBE TQ 12-25: CPT | Performed by: SURGERY

## 2024-12-15 PROCEDURE — 83735 ASSAY OF MAGNESIUM: CPT | Performed by: NURSE PRACTITIONER

## 2024-12-15 PROCEDURE — 71045 X-RAY EXAM CHEST 1 VIEW: CPT

## 2024-12-15 PROCEDURE — 83690 ASSAY OF LIPASE: CPT | Performed by: NURSE PRACTITIONER

## 2024-12-15 PROCEDURE — 36415 COLL VENOUS BLD VENIPUNCTURE: CPT

## 2024-12-15 PROCEDURE — 99223 1ST HOSP IP/OBS HIGH 75: CPT | Performed by: NURSE PRACTITIONER

## 2024-12-15 RX ORDER — SODIUM CHLORIDE 0.9 % (FLUSH) 0.9 %
10 SYRINGE (ML) INJECTION EVERY 12 HOURS SCHEDULED
Status: DISCONTINUED | OUTPATIENT
Start: 2024-12-15 | End: 2024-12-17 | Stop reason: HOSPADM

## 2024-12-15 RX ORDER — PANTOPRAZOLE SODIUM 40 MG/10ML
40 INJECTION, POWDER, LYOPHILIZED, FOR SOLUTION INTRAVENOUS
Status: DISCONTINUED | OUTPATIENT
Start: 2024-12-15 | End: 2024-12-17

## 2024-12-15 RX ORDER — SODIUM CHLORIDE 0.9 % (FLUSH) 0.9 %
10 SYRINGE (ML) INJECTION AS NEEDED
Status: DISCONTINUED | OUTPATIENT
Start: 2024-12-15 | End: 2024-12-17 | Stop reason: HOSPADM

## 2024-12-15 RX ORDER — HYDROCORTISONE ACETATE 25 MG/1
25 SUPPOSITORY RECTAL 2 TIMES DAILY
Status: DISCONTINUED | OUTPATIENT
Start: 2024-12-15 | End: 2024-12-17 | Stop reason: HOSPADM

## 2024-12-15 RX ORDER — HYDROMORPHONE HYDROCHLORIDE 1 MG/ML
0.5 INJECTION, SOLUTION INTRAMUSCULAR; INTRAVENOUS; SUBCUTANEOUS
Status: DISPENSED | OUTPATIENT
Start: 2024-12-15 | End: 2024-12-16

## 2024-12-15 RX ORDER — SODIUM CHLORIDE 9 MG/ML
125 INJECTION, SOLUTION INTRAVENOUS CONTINUOUS
Status: ACTIVE | OUTPATIENT
Start: 2024-12-15 | End: 2024-12-16

## 2024-12-15 RX ORDER — ONDANSETRON 2 MG/ML
4 INJECTION INTRAMUSCULAR; INTRAVENOUS ONCE
Status: COMPLETED | OUTPATIENT
Start: 2024-12-15 | End: 2024-12-15

## 2024-12-15 RX ORDER — HYDROMORPHONE HYDROCHLORIDE 1 MG/ML
0.5 INJECTION, SOLUTION INTRAMUSCULAR; INTRAVENOUS; SUBCUTANEOUS ONCE
Status: COMPLETED | OUTPATIENT
Start: 2024-12-15 | End: 2024-12-15

## 2024-12-15 RX ORDER — VANCOMYCIN/0.9 % SOD CHLORIDE 1.5G/250ML
20 PLASTIC BAG, INJECTION (ML) INTRAVENOUS ONCE
Status: COMPLETED | OUTPATIENT
Start: 2024-12-15 | End: 2024-12-15

## 2024-12-15 RX ORDER — PRAVASTATIN SODIUM 40 MG
40 TABLET ORAL DAILY
Status: DISCONTINUED | OUTPATIENT
Start: 2024-12-15 | End: 2024-12-17 | Stop reason: HOSPADM

## 2024-12-15 RX ORDER — SODIUM CHLORIDE 9 MG/ML
40 INJECTION, SOLUTION INTRAVENOUS AS NEEDED
Status: DISCONTINUED | OUTPATIENT
Start: 2024-12-15 | End: 2024-12-17 | Stop reason: HOSPADM

## 2024-12-15 RX ORDER — METRONIDAZOLE 500 MG/100ML
500 INJECTION, SOLUTION INTRAVENOUS ONCE
Status: COMPLETED | OUTPATIENT
Start: 2024-12-15 | End: 2024-12-15

## 2024-12-15 RX ORDER — ONDANSETRON 2 MG/ML
4 INJECTION INTRAMUSCULAR; INTRAVENOUS EVERY 6 HOURS PRN
Status: DISCONTINUED | OUTPATIENT
Start: 2024-12-15 | End: 2024-12-17 | Stop reason: HOSPADM

## 2024-12-15 RX ORDER — POLYETHYLENE GLYCOL 3350 17 G/17G
17 POWDER, FOR SOLUTION ORAL DAILY
Status: DISCONTINUED | OUTPATIENT
Start: 2024-12-15 | End: 2024-12-17 | Stop reason: HOSPADM

## 2024-12-15 RX ADMIN — PANTOPRAZOLE SODIUM 40 MG: 40 INJECTION, POWDER, LYOPHILIZED, FOR SOLUTION INTRAVENOUS at 05:01

## 2024-12-15 RX ADMIN — METRONIDAZOLE 500 MG: 500 INJECTION, SOLUTION INTRAVENOUS at 02:26

## 2024-12-15 RX ADMIN — SODIUM CHLORIDE 125 ML/HR: 9 INJECTION, SOLUTION INTRAVENOUS at 04:25

## 2024-12-15 RX ADMIN — AVOBENZONE, HOMOSALATE, OCTISALATE, OCTOCRYLENE, AND OXYBENZONE 1 PACKET: 29.4; 147; 49; 25.4; 58.8 LOTION TOPICAL at 17:44

## 2024-12-15 RX ADMIN — SODIUM CHLORIDE 1000 ML: 9 INJECTION, SOLUTION INTRAVENOUS at 00:49

## 2024-12-15 RX ADMIN — Medication 10 ML: at 20:49

## 2024-12-15 RX ADMIN — PIPERACILLIN AND TAZOBACTAM 3.38 G: 3; .375 INJECTION, POWDER, LYOPHILIZED, FOR SOLUTION INTRAVENOUS; PARENTERAL at 12:14

## 2024-12-15 RX ADMIN — Medication 1500 MG: at 03:09

## 2024-12-15 RX ADMIN — POLYETHYLENE GLYCOL 3350 17 G: 17 POWDER, FOR SOLUTION ORAL at 17:44

## 2024-12-15 RX ADMIN — ONDANSETRON 4 MG: 2 INJECTION INTRAMUSCULAR; INTRAVENOUS at 00:49

## 2024-12-15 RX ADMIN — HYDROMORPHONE HYDROCHLORIDE 0.5 MG: 1 INJECTION, SOLUTION INTRAMUSCULAR; INTRAVENOUS; SUBCUTANEOUS at 05:06

## 2024-12-15 RX ADMIN — PIPERACILLIN AND TAZOBACTAM 3.38 G: 3; .375 INJECTION, POWDER, LYOPHILIZED, FOR SOLUTION INTRAVENOUS at 01:47

## 2024-12-15 RX ADMIN — PIPERACILLIN AND TAZOBACTAM 3.38 G: 3; .375 INJECTION, POWDER, LYOPHILIZED, FOR SOLUTION INTRAVENOUS; PARENTERAL at 23:06

## 2024-12-15 RX ADMIN — HYDROMORPHONE HYDROCHLORIDE 0.5 MG: 1 INJECTION, SOLUTION INTRAMUSCULAR; INTRAVENOUS; SUBCUTANEOUS at 00:49

## 2024-12-15 RX ADMIN — HYDROCORTISONE ACETATE 25 MG: 25 SUPPOSITORY RECTAL at 20:46

## 2024-12-15 RX ADMIN — SODIUM CHLORIDE 2382 ML: 9 INJECTION, SOLUTION INTRAVENOUS at 01:48

## 2024-12-15 RX ADMIN — Medication 5 MG: at 19:51

## 2024-12-15 NOTE — CONSULTS
[facility]:  Inpatient Gastroenterology Consult      Inpatient Gastroenterology Consult  Consult performed by: Davon Way APRN  Consult ordered by: Du Pollack MD  Reason for consult: Leukocytosis, pancreatitis, diarrhea, recent colonoscopy with polypectomy      Referring Provider: No ref. provider found    PCP: Tammy Myers MD    Chief Complaint: Abdominal pain    History of present illness:  Nolberto Jackson Jr. is a 69 y.o. male who is admitted with abdominal pain.  GI consult received for leukocytosis, concerns for pancreatitis, diarrhea, and recent colonoscopy with polypectomy.    Patient notes that for the past few weeks he has had worsening right lower quadrant abdominal for which he sought care at [facility] where he was diagnosed with a UTI and sent home on antibiotics.  Notes that his symptoms did not improve so he then followed up with his PCP who obtained imaging and diagnosed him with colitis and referred him back to his gastroenterologist who completed a colonoscopy within the last 2 weeks that was significant only for a few benign-appearing polyps and no evidence of inflammation.  Patient reports that when he is having the right lower quadrant abdominal pain, it is radiating into his back and down into his rectum.  Also reports issues with initiation of bowel movements as he will have urgency but inability to have a BM.  Reports that once he gets the plug of stool out his symptoms improve.  Does not endorse any issues with nausea or vomiting.  Does have some liquidy stool behind the plug as well as constipation.  No shortness of breath, chest pain, or fever/chills.  Does have a surgical history concerning for a hemicolectomy for colon cancer.  At time of admission, CT imaging shows evidence of postoperative changes from subtotal colectomy and there is some dilated loops of small bowel but no complete obstruction; rectum shows evidence of increased  fecal burden.  There is significant leukocytosis at time of admission and elevated lipase there is no evidence of pancreatitis on imaging. Past medical, surgical, social, and family histories are reviewed for accuracy.  No documented alleviating or exacerbating factors.  Does not endorse pain at time of exam.    Allergies:  Shellfish allergy, Diphenhydramine hcl, Midazolam, and Zolpidem    Scheduled Meds:  pantoprazole, 40 mg, Intravenous, Q AM  piperacillin-tazobactam, 3.375 g, Intravenous, Q12H  pravastatin, 40 mg, Oral, Daily  sodium chloride, 10 mL, Intravenous, Q12H         Infusions:  sodium chloride, 125 mL/hr, Last Rate: 125 mL/hr (12/15/24 0744)        PRN Meds:    HYDROmorphone    melatonin    ondansetron    Sodium Chloride (PF)    sodium chloride    sodium chloride    Home Meds:  Medications Prior to Admission   Medication Sig Dispense Refill Last Dose/Taking    allopurinol (ZYLOPRIM) 100 MG tablet Take 1 tablet by mouth Daily.       amLODIPine (NORVASC) 10 MG tablet Take 1 tablet by mouth Daily.       carvedilol (COREG) 3.125 MG tablet Take 1 tablet by mouth 2 (Two) Times a Day.       ondansetron (ZOFRAN) 4 MG tablet Take 1 tablet by mouth Every 8 (Eight) Hours As Needed for post-op nausea. 30 tablet 0     oxyCODONE (ROXICODONE) 5 MG immediate release tablet Take 1 tablet by mouth Every 4 (Four) Hours As Needed for Moderate Pain. 40 tablet 0     pravastatin (PRAVACHOL) 40 MG tablet Take 1 tablet by mouth Daily.       ropivacaine (NAROPIN) 0.2 % infusion (INFUSYSTEM) 2 mg/hr by Peripheral Nerve route Continuous.          ROS: Review of Systems   Constitutional:  Positive for activity change, appetite change and fatigue. Negative for chills, diaphoresis, fever and unexpected weight change.   HENT:  Negative for sore throat, trouble swallowing and voice change.    Eyes: Negative.    Respiratory:  Negative for apnea, cough, choking, chest tightness, shortness of breath, wheezing and stridor.     Cardiovascular:  Negative for chest pain, palpitations and leg swelling.   Gastrointestinal:  Positive for abdominal pain, constipation, diarrhea and nausea. Negative for abdominal distention, anal bleeding, blood in stool, rectal pain and vomiting.        + Anorectal pain   Endocrine: Negative.    Genitourinary: Negative.    Musculoskeletal: Negative.    Skin: Negative.    Allergic/Immunologic: Negative.    Neurological: Negative.    Hematological: Negative.    Psychiatric/Behavioral: Negative.     All other systems reviewed and are negative.      PAST MED HX:  Past Medical History:   Diagnosis Date    Colon polyp     HX OF    Dialysis patient     NO DILAYSIS SINCE 2009    Fistula     LEFT ARM, DOES NOT WORK    Glenohumeral arthritis, right 02/01/2024    Gout     Hard of hearing     Hearing aid worn     RIGHT EAR    History of hepatitis C     treated 2009    History of kidney cancer 2017    LEFT KIDNEY    History of transfusion     PATIENT DENIES REACTION    Hyperlipidemia     Hypertension     Liver cirrhosis     MRSA infection 2005    D/T COLON CANCER SURGERY    Status post total shoulder arthroplasty, right 02/01/2024    Wears dentures     UPPER AND LOWER       PAST SURG HX:  Past Surgical History:   Procedure Laterality Date    COLECTOMY PARTIAL / TOTAL Left     2005, 2006    COLONOSCOPY      NEPHRECTOMY Left     TOTAL SHOULDER ARTHROPLASTY Right 2/1/2024    Procedure: TOTAL SHOULDER ARTHROPLASTY - RIGHT LEFT SHOULDER STEROID INJECTION;  Surgeon: Jhon Fang MD;  Location:  [facility];  Service: Orthopedics;  Laterality: Right;    TOTAL SHOULDER ARTHROPLASTY W/ DISTAL CLAVICLE EXCISION Left 8/29/2024    Procedure: TOTAL SHOULDER ARTHROPLASTY LEFT;  Surgeon: Jhon Fang MD;  Location:  [facility];  Service: Orthopedics;  Laterality: Left;    URETERECTOMY Left 2007    cancer       FAM HX:  Family History   Problem Relation Age of Onset    No Known Problems Mother     Pancreatic cancer Father   "      SOC HX:  Social History     Socioeconomic History    Marital status:    Tobacco Use    Smoking status: Former     Current packs/day: 0.00     Types: Cigarettes     Quit date: 2019     Years since quittin.9    Smokeless tobacco: Never   Vaping Use    Vaping status: Never Used   Substance and Sexual Activity    Alcohol use: Not Currently     Comment: rare    Drug use: Not Currently    Sexual activity: Defer       PHYSICAL EXAM  /70   Pulse 96   Temp 97.8 °F (36.6 °C)   Resp 18   Ht 172.7 cm (67.99\")   Wt 80.7 kg (178 lb)   SpO2 93%   BMI 27.07 kg/m²   Wt Readings from Last 3 Encounters:   12/15/24 80.7 kg (178 lb)   24 86.2 kg (190 lb)   24 88.6 kg (195 lb 7 oz)   ,body mass index is 27.07 kg/m².  Physical Exam  Vitals and nursing note reviewed.   Constitutional:       General: He is not in acute distress.     Appearance: Normal appearance. He is normal weight. He is not ill-appearing or toxic-appearing.   HENT:      Head: Normocephalic and atraumatic.   Eyes:      General: No scleral icterus.     Extraocular Movements: Extraocular movements intact.      Conjunctiva/sclera: Conjunctivae normal.      Pupils: Pupils are equal, round, and reactive to light.   Cardiovascular:      Rate and Rhythm: Normal rate and regular rhythm.      Pulses: Normal pulses.      Heart sounds: Normal heart sounds.   Pulmonary:      Effort: Pulmonary effort is normal. No respiratory distress.      Breath sounds: Normal breath sounds.   Abdominal:      General: Abdomen is flat. Bowel sounds are normal. There is no distension.      Palpations: Abdomen is soft. There is no mass.      Tenderness: There is no abdominal tenderness. There is no guarding or rebound.      Hernia: A hernia is present.      Comments: Prior midline laparotomy scar appreciated on abdomen; evidence of incisional/ventral hernia noted.   Genitourinary:     Comments: defer  Musculoskeletal:      Right lower leg: No edema.      Left " lower leg: No edema.   Skin:     General: Skin is warm and dry.      Capillary Refill: Capillary refill takes less than 2 seconds.      Coloration: Skin is not jaundiced or pale.   Neurological:      General: No focal deficit present.      Mental Status: He is alert and oriented to person, place, and time.      Comments: Paraspinal tenderness noted to light palpation   Psychiatric:         Mood and Affect: Mood normal.         Behavior: Behavior normal.         Thought Content: Thought content normal.         Judgment: Judgment normal.         Results Review:   I reviewed the patient's new clinical results.  I reviewed the patient's new imaging results and agree with the interpretation.  I reviewed the patient's other test results and agree with the interpretation  I personally viewed and interpreted the patient's EKG/Telemetry data    Lab Results   Component Value Date    WBC 13.01 (H) 12/15/2024    HGB 10.4 (L) 12/15/2024    HGB 13.8 12/15/2024    HGB 11.2 (L) 09/12/2024    HCT 33.3 (L) 12/15/2024    MCV 92.5 12/15/2024     (L) 12/15/2024       Lab Results   Component Value Date    INR 1.11 08/22/2024    INR 1.07 01/18/2024    INR 1.03 11/10/2022       Lab Results   Component Value Date    GLUCOSE 115 (H) 12/15/2024    BUN 75 (H) 12/15/2024    CREATININE 3.95 (H) 12/15/2024    EGFRIFNONA 26 (L) 10/22/2019    BCR 19.0 12/15/2024     12/15/2024    K 4.3 12/15/2024    CO2 12.0 (L) 12/15/2024    CALCIUM 8.4 (L) 12/15/2024    ALBUMIN 3.5 12/15/2024    ALKPHOS 119 (H) 12/15/2024    BILITOT 0.3 12/15/2024    ALT 13 12/15/2024    AST 14 12/15/2024     Colonoscopy:   Colonoscopy to terminal ileum and snare polypectomy  Colon prep adequate  Normal terminal ileum. Previous right hemicolectomy with normal anastomosis. Benign polyps in the sigmoid colon and rectum, 5-12mm, snared with cautery and sent to pathology. The largest polypectomy site in the sigmoid colon was closed with a metallic clip to prevent  bleeding. No bleeding. Medium hemorrhoids.    Final diagnosis:  Colon polyps. Right hemicolectomy.    Follow up colonoscopy in 2 years.   Stool softener at bedtime.     Colon Polyp Path:   DIAGNOSIS:   A.    SIGMOID COLON POLYP:   Tubular adenoma without high-grade dysplasia, margin clear   B.     RECTAL POLYP:   Hyperplastic polyp with prolapse changes   Hyperplasia involves the inked margin   Negative for dysplasia or malignancy     XR Chest 1 View  Result Date: 12/15/2024  Examination: XR CHEST 1 VW-  Date of Exam: 12/15/2024 5:02 AM  Indication: sepsis- unlcear etiology; A41.9-Sepsis, unspecified organism; R65.20-Severe sepsis without septic shock; N17.9-Acute kidney failure, unspecified; R10.31-Right lower quadrant pain; R19.7-Diarrhea, unspecified; R11.0-Nausea; R74.8-Abnormal levels of other serum enzymes; N17.9-Acute kidney failure, unspecified; N18.30-Chronic kidney disease, stage 3 unspecified; Z85.038-Personal history of other mal.  Comparison: Chest radiograph dated January 18, 2024  Technique: 1 view of the chest  Findings: There are no airspace consolidations. No pleural effusions. No pneumothorax. The heart size is normal. The pulmonary vasculature appears within normal limits. No acute osseous abnormality identified.      No acute cardiopulmonary process.  This report was finalized on 12/15/2024 5:33 AM by Michael Castillo MD.      CT Abdomen Pelvis Without Contrast    Result Date: 12/15/2024  CT ABDOMEN PELVIS WO CONTRAST Date of Exam: 12/15/2024 1:20 AM EST Indication: right renal colic, nausea, bowel changes, recent colitis, h/o CKD Stage 3. Comparison: None available. Technique: Axial CT images were obtained of the abdomen and pelvis without the administration of contrast. Reconstructed coronal and sagittal images were also obtained. Automated exposure control and iterative construction methods were used. Findings: Lung Bases:   There is scarring in the lateral aspect of the right lower lobe. Liver:  Liver is normal in size and CT density. No focal lesions. Biliary/Gallbladder:  The gallbladder is normal without evidence of radiopaque stones. The biliary tree is nondilated. Spleen: Spleen is normal in size and CT density. Pancreas:  Pancreas is normal. There is no evidence of pancreatic mass or peripancreatic fluid. Kidneys:  There are postoperative changes from left nephrectomy. There are areas of scarring of the right kidney. There is no right-sided renal mass or hydronephrosis. Adrenals:  Adrenal glands are unremarkable. Retroperitoneal/Lymph Nodes/Vasculature:  No retroperitoneal adenopathy is identified. Gastrointestinal/Mesentery:  There are postoperative changes from subtotal colectomy. There are few mildly dilated loops of the residual colon anastomosis in the small bowel adjacent. The more proximal small bowel is also fluid-filled and mildly dilated but there is no complete obstruction. There is no evidence of inflammatory change of the large or small bowel. Bladder:  The bladder is normal. Genital:   There is enlargement of the prostate gland.       Bony Structures:   Visualized bony structures are consistent with the patient's age.     Impression: Postoperative changes from subtotal colectomy. There are a few mildly dilated loops of small bowel and colon but there is no complete obstruction. There is no inflammatory change of the bowel. Electronically Signed: Michael Castillo MD  12/15/2024 1:43 AM EST  Workstation ID: [id number]    CT Abdomen Pelvis Without Contrast    Result Date: 11/20/2024  CT SCAN OF THE ABDOMEN AND PELVIS WITHOUT CONTRAST    11/19/2024 10:53 PM HISTORY: Constipation x4 days. COMPARISON: November 3, 2024. PROCEDURE: The patient was imaged without IV contrast administration. [] Axial images were obtained from the lung bases to the pubic symphysis by computed tomography. This study was performed with techniques to keep radiation doses as low as reasonably achievable, (ALARA).  Individualized dose reduction techniques using automated exposure control or adjustment of mA and/or kV according to the patient size were employed. FINDINGS: The study is limited for the detection of disease or injury due to the lack of contrast. ABDOMEN: The lung bases are clear. The heart is proper size. The liver, spleen, pancreas appear unremarkable and demonstrate no non-contrast evidence of a focal lesion.  No adrenal mass or nodule is evident. The patient is status post left nephrectomy. The right kidney is unremarkable. There is no hydronephrosis, no renal calculi, and no non-contrast evidence of a solid intrarenal mass.  The aorta and major branch vessels are normal in caliber. There is no free fluid or adenopathy. There are no dilated loops of bowel. There is no free fluid or intraperitoneal air. There is no significant stranding seen in the mesentery. There are multifocal regions of mural thickening identified in the sigmoid colon with subtle associated inflammatory changes in the pelvis. PELVIS: The appendix is not identified. The urinary bladder is unremarkable. There is a persistently enlarged prostate gland. There is no significant free fluid or adenopathy. Genital organs appear unremarkable for patient's age.     Multifocal areas of mural thickening in the sigmoid colon may represent a mild colitis, consider follow-up colonoscopy. Images reviewed, interpreted, and dictated by Dr. Gene Meredith. Transcribed by Dontrell Kinsey.     ASSESSMENTS/PLANS  1.  Constipation and diarrhea, suspect overflow  2.  Anorectal pain, RLQ pain  3.  History of colon cancer requiring subtotal colectomy  4.  S/p recent colonoscopy with polypectomy  5.  History of renal cancer, s/p left nephrectomy.  6.  TAM on CKD  7.  Personal history of colon polyps, tubular adenoma and hyperplastic polyp and most recent colonoscopy with recommendations for 2-year recall colonoscopy.    Nolberto Jackson Jr. is a 69 y.o. male who  presents to hospital with abdominal pain and associated rectal pain with changes in bowel habits.  CT imaging reviewed and shows dilated loops of small bowel without evidence of obstruction; additionally, there is significant fecal burden at the level of the rectum consistent with mild constipation.  Infectious stool studies are negative.  Broad differentials include recent viral illness with postinfectious IBS as well as constipation with overflow.  At time of exam, pain has improved as has leukocytosis.  At present, recommend initiation of bowel regimen, Anusol suppositories, and gentle fluid hydration.  Will obtain records from recent colonoscopy including pathology results on the polyps that were removed.    >>> Okay for diet as tolerated  >>> Daily MiraLAX  >>> Daily fiber therapy  >>> Anusol suppository 25 mg twice daily x 5 days given known hemorrhoids on most recent colonoscopy.  >>> Medical records from Dr. Mahesh Cardozo reviewed and as noted above    I discussed the patient's findings and my recommendations with patient, family, and consulting provider    RAYNE Marquis  12/15/24  16:52 EST

## 2024-12-15 NOTE — Clinical Note
Level of Care: Telemetry [5]   Diagnosis: Partial small bowel obstruction [[id number]]   Admitting Physician: LAWANDA FERNANDEZ [[id number]]

## 2024-12-15 NOTE — ED NOTES
Nolberto Jackson Jr.    Nursing Report ED to Floor:  Mental status: A&Ox4  Ambulatory status: IND  Oxygen Therapy:  RA  Cardiac Rhythm: NS  Admitted from: Home  Safety Concerns:  No sticks or BP on Left arm  Social Issues: n/a  ED Room #:  04    ED Nurse Phone Extension - [phone] or may call [phone].      HPI:   Chief Complaint   Patient presents with    Abdominal Pain       Past Medical History:  Past Medical History:   Diagnosis Date    Colon polyp     HX OF    Dialysis patient     NO DILAYSIS SINCE 2009    Fistula     LEFT ARM, DOES NOT WORK    Glenohumeral arthritis, right 02/01/2024    Gout     Hard of hearing     Hearing aid worn     RIGHT EAR    History of hepatitis C     treated 2009    History of kidney cancer 2017    LEFT KIDNEY    History of transfusion     PATIENT DENIES REACTION    Hyperlipidemia     Hypertension     Liver cirrhosis     MRSA infection 2005    D/T COLON CANCER SURGERY    Status post total shoulder arthroplasty, right 02/01/2024    Wears dentures     UPPER AND LOWER        Past Surgical History:  Past Surgical History:   Procedure Laterality Date    COLECTOMY PARTIAL / TOTAL Left     2005, 2006    COLONOSCOPY      NEPHRECTOMY Left     TOTAL SHOULDER ARTHROPLASTY Right 2/1/2024    Procedure: TOTAL SHOULDER ARTHROPLASTY - RIGHT LEFT SHOULDER STEROID INJECTION;  Surgeon: Jhon Fang MD;  Location:  [facility];  Service: Orthopedics;  Laterality: Right;    TOTAL SHOULDER ARTHROPLASTY W/ DISTAL CLAVICLE EXCISION Left 8/29/2024    Procedure: TOTAL SHOULDER ARTHROPLASTY LEFT;  Surgeon: Jhon Fang MD;  Location:  [facility];  Service: Orthopedics;  Laterality: Left;    URETERECTOMY Left 2007    cancer        Admitting Doctor:   Meaghan Gonzalez MD    Consulting Provider(s):  Consults       No orders found from 11/16/2024 to 12/16/2024.             Admitting Diagnosis:       Most Recent Vitals:   Vitals:    12/15/24 0108 12/15/24 0112 12/15/24 0113 12/15/24 0117   BP:       Pulse: 87 88  87 88   Resp:       Temp:       SpO2: 96% 97%  96%   Weight:       Height:           Active LDAs/IV Access:   Lines, Drains & Airways       Active LDAs       Name Placement date Placement time Site Days    Peripheral IV 12/15/24 0036 Right Antecubital 12/15/24  0036  Antecubital  less than 1    Nerve Block Catheter 08/29/24 0851 Lauro Coleman CRNA left interscalene 08/29/24  0851  created via procedure documentation  -- 107                    Labs (abnormal labs have a star):   Labs Reviewed   COMPREHENSIVE METABOLIC PANEL - Abnormal; Notable for the following components:       Result Value    Glucose 115 (*)     BUN 83 (*)     Creatinine 4.15 (*)     Chloride 111 (*)     CO2 11.0 (*)     Alkaline Phosphatase 143 (*)     Anion Gap 16.0 (*)     eGFR 15.0 (*)     All other components within normal limits    Narrative:     GFR Categories in Chronic Kidney Disease (CKD)      GFR Category          GFR (mL/min/1.73)    Interpretation  G1                     90 or greater         Normal or high (1)  G2                      60-89                Mild decrease (1)  G3a                   45-59                Mild to moderate decrease  G3b                   30-44                Moderate to severe decrease  G4                    15-29                Severe decrease  G5                    14 or less           Kidney failure          (1)In the absence of evidence of kidney disease, neither GFR category G1 or G2 fulfill the criteria for CKD.    eGFR calculation 2021 CKD-EPI creatinine equation, which does not include race as a factor   LIPASE - Abnormal; Notable for the following components:    Lipase 557 (*)     All other components within normal limits   URINALYSIS W/ MICROSCOPIC IF INDICATED (NO CULTURE) - Abnormal; Notable for the following components:    Blood, UA Large (3+) (*)     Protein,  mg/dL (2+) (*)     Leuk Esterase, UA Trace (*)     All other components within normal limits   CBC WITH AUTO DIFFERENTIAL -  Abnormal; Notable for the following components:    WBC 30.99 (*)     Neutrophil % 85.0 (*)     Lymphocyte % 5.9 (*)     Monocyte % 4.5 (*)     Immature Grans % 1.0 (*)     Neutrophils, Absolute 26.32 (*)     Monocytes, Absolute 1.40 (*)     Eosinophils, Absolute 1.00 (*)     Immature Grans, Absolute 0.31 (*)     All other components within normal limits    Narrative:     Appended report. These results have been appended to a previously verified report.   URINALYSIS, MICROSCOPIC ONLY - Abnormal; Notable for the following components:    RBC, UA 6-10 (*)     WBC, UA 3-5 (*)     All other components within normal limits   LACTIC ACID, PLASMA - Normal   SCAN SLIDE - Normal   BLOOD CULTURE   BLOOD CULTURE   RAINBOW DRAW    Narrative:     The following orders were created for panel order [place] Draw.  Procedure                               Abnormality         Status                     ---------                               -----------         ------                     Green Top (Gel)[[id number]]                                  Final result               Lavender Top[[id number]]                                     Final result               Gold Top - SST[[id number]]                                   Final result               Pascual Top[[id number]]                                         Final result               Light Blue Top[[id number]]                                   Final result                 Please view results for these tests on the individual orders.   CBC AND DIFFERENTIAL    Narrative:     The following orders were created for panel order CBC & Differential.  Procedure                               Abnormality         Status                     ---------                               -----------         ------                     CBC Auto Differential[[id number]]        Abnormal            Final result               Scan Slide[[id number]]                   Normal              Final result                 Please  view results for these tests on the individual orders.   GREEN TOP   LAVENDER TOP   GOLD TOP - SST   GRAY TOP   LIGHT BLUE TOP       Meds Given in ED:   Medications   Sodium Chloride (PF) 0.9 % 10 mL (has no administration in time range)   vancomycin IVPB 1500 mg in 0.9% NaCl (Premix) 500 mL (1,500 mg Intravenous New Bag 12/15/24 0309)   sodium chloride 0.9 % bolus 1,000 mL (0 mL Intravenous Stopped 12/15/24 0209)   ondansetron (ZOFRAN) injection 4 mg (4 mg Intravenous Given 12/15/24 0049)   HYDROmorphone (DILAUDID) injection 0.5 mg (0.5 mg Intravenous Given 12/15/24 0049)   sepsis fluid NS 0.9 % bolus 2,382 mL (2,382 mL Intravenous New Bag 12/15/24 0148)   piperacillin-tazobactam (ZOSYN) 3.375 g IVPB in 100 mL NS MBP (CD) (0 g Intravenous Stopped 12/15/24 0257)   metroNIDAZOLE (FLAGYL) IVPB 500 mg (0 mg Intravenous Stopped 12/15/24 0309)           Last NIH score:                                                          Dysphagia screening results:        [place] Coma Scale:  No data recorded     CIWA:        Restraint Type:            Isolation Status:  No active isolations

## 2024-12-15 NOTE — H&P
"    [facility] Medicine Services  HISTORY AND PHYSICAL    Patient Name: Nolberto Jackson Jr.  : 1955  MRN: [id number]  Primary Care Physician: Tammy Myers MD  Date of admission: 12/15/2024    Subjective   Subjective     Chief Complaint:  Abdominal pain     HPI:  Nolberto Jackson Jr. is a 69 y.o. male w/ a hx of colon cancer (s/p colectomy ), hx of left ureteral cancer (s/p left nephrectomy ), CKD Stage 4, HLD, HTN who presented to the ED w/ c/o abdominal pain.   Pt evaluated at the [facility] ED on 11/3/24 and diagnosed w/ cystitis and an enlarged prostate. Pt was prescribed a 10 days antibiotic course. Pt reports that his symptoms did not improve and his PCP prescribed another 10 day course of antibiotics.  Pt continued to feel poorly and was evaluated at the [facility] ED on 24 and diagnosed w/ colitis.   Pt underwent a colonoscopy ~ 1 week ago. Scope revealed benign polyps but no other acute inflammation of the colon.    Pt has continued to have right sided abdominal pain, constipation, nausea, intermittent dysuria and chills. Pt reports that he continues to have constipation but reports feeling the urge to have a bowel movement frequently. Pt describes that when he does have a bowel movement it is \"straight clear water\". Pt had a small \"watery\" BM earlier tonight. C/o ongoing nausea but denies vomiting. Pt is unsure if he has had a fever but reports chills and hot flashes.   Pt evaluated in the ED. CT abd/pel revealed \"Postoperative changes from subtotal colectomy. There are a few mildly dilated loops of small bowel and colon but there is no complete obstruction. There is no inflammatory change of the bowel\". WBC elevated at 30.99. Lipase elevated at 557. Pt admitted to the hospital medicine service for further evaluation.     Review of Systems   Constitutional:  Positive for chills and fatigue.   HENT: Negative.  Negative for congestion, " postnasal drip, rhinorrhea, sinus pain and trouble swallowing.    Eyes: Negative.  Negative for visual disturbance.   Respiratory: Negative.  Negative for cough and shortness of breath.    Cardiovascular: Negative.  Negative for chest pain, palpitations and leg swelling.   Gastrointestinal:  Positive for abdominal pain, constipation, diarrhea and nausea. Negative for vomiting.   Endocrine: Negative.    Genitourinary:  Positive for decreased urine volume and dysuria. Negative for difficulty urinating and frequency.   Musculoskeletal: Negative.  Negative for arthralgias and myalgias.   Skin: Negative.  Negative for wound.   Allergic/Immunologic: Negative.  Negative for immunocompromised state.   Neurological: Negative.  Negative for dizziness, syncope, weakness, light-headedness and headaches.   Hematological: Negative.  Does not bruise/bleed easily.   Psychiatric/Behavioral: Negative.  Negative for confusion.    All other systems reviewed and are negative.     Personal History     Past Medical History:   Diagnosis Date    Colon polyp     HX OF    Dialysis patient     NO DILAYSIS SINCE 2009    Fistula     LEFT ARM, DOES NOT WORK    Glenohumeral arthritis, right 02/01/2024    Gout     Hard of hearing     Hearing aid worn     RIGHT EAR    History of hepatitis C     treated 2009    History of kidney cancer 2017    LEFT KIDNEY    History of transfusion     PATIENT DENIES REACTION    Hyperlipidemia     Hypertension     Liver cirrhosis     MRSA infection 2005    D/T COLON CANCER SURGERY    Status post total shoulder arthroplasty, right 02/01/2024    Wears dentures     UPPER AND LOWER     Past Surgical History:   Procedure Laterality Date    COLECTOMY PARTIAL / TOTAL Left     2005, 2006    COLONOSCOPY      NEPHRECTOMY Left     TOTAL SHOULDER ARTHROPLASTY Right 2/1/2024    Procedure: TOTAL SHOULDER ARTHROPLASTY - RIGHT LEFT SHOULDER STEROID INJECTION;  Surgeon: Jhon Fang MD;  Location: [facility];  Service:  Orthopedics;  Laterality: Right;    TOTAL SHOULDER ARTHROPLASTY W/ DISTAL CLAVICLE EXCISION Left 8/29/2024    Procedure: TOTAL SHOULDER ARTHROPLASTY LEFT;  Surgeon: Jhon Fang MD;  Location: [facility];  Service: Orthopedics;  Laterality: Left;    URETERECTOMY Left 2007    cancer     Family History: family history includes No Known Problems in his mother; Pancreatic cancer in his father.     Social History:  reports that he quit smoking about 5 years ago. His smoking use included cigarettes. He has never used smokeless tobacco. He reports that he does not currently use alcohol. He reports that he does not currently use drugs.  Social History     Social History Narrative    Not on file     Medications:  allopurinol, amLODIPine, carvedilol, ondansetron, oxyCODONE, pravastatin, and ropivacaine    Allergies   Allergen Reactions    Shellfish Allergy Unknown - Low Severity    Diphenhydramine Hcl Delirium    Midazolam Other (See Comments)     Other reaction(s): N+V - Nausea and vomiting, N+V - Nausea and vomiting    Zolpidem Delirium     Other reaction(s): nightmares, nightmares     Objective   Objective     Vital Signs:   Temp:  [96.8 °F (36 °C)-98 °F (36.7 °C)] 96.8 °F (36 °C)  Heart Rate:  [] 93  Resp:  [18] 18  BP: (106-130)/(68-94) 129/85    Physical Exam     Constitutional: Awake, alert; ill appearing   Eyes: PERRLA, sclerae anicteric, no conjunctival injection  HENT: NCAT, mucous membranes dry   Neck: Supple, no thyromegaly, no lymphadenopathy, trachea midline  Respiratory: Clear to auscultation bilaterally, nonlabored respirations   Cardiovascular: RRR, no murmurs, rubs, or gallops, no peripheral edema   Gastrointestinal: Positive bowel sounds, soft, non-distended; RUQ/RLQ and epigastric region-TTP   Musculoskeletal: Normal ROM bilaterally   Psychiatric: Appropriate affect, cooperative  Neurologic: Oriented x 3, strength symmetric in all extremities, speech clear  Skin: No rashes, lesions or  wounds    Result Review:  I have personally reviewed the results from the time of this admission to 12/15/2024 04:19 EST and agree with these findings:  [x]  Laboratory list / accordion  []  Microbiology  [x]  Radiology  []  EKG/Telemetry   []  Cardiology/Vascular   []  Pathology  [x]  Old records    LAB RESULTS:      Lab 12/15/24  0035   WBC 30.99*   HEMOGLOBIN 13.8   HEMATOCRIT 42.3   PLATELETS 274   NEUTROS ABS 26.32*   IMMATURE GRANS (ABS) 0.31*   LYMPHS ABS 1.84   MONOS ABS 1.40*   EOS ABS 1.00*   MCV 89.6   SED RATE 68*   LACTATE 1.0         Lab 12/15/24  0145   SODIUM 138   POTASSIUM 4.7   CHLORIDE 111*   CO2 11.0*   ANION GAP 16.0*   BUN 83*   CREATININE 4.15*   EGFR 15.0*   GLUCOSE 115*   CALCIUM 9.7         Lab 12/15/24  0145   TOTAL PROTEIN 7.5   ALBUMIN 3.8   GLOBULIN 3.7   ALT (SGPT) 15   AST (SGOT) 17   BILIRUBIN 0.2   ALK PHOS 143*   LIPASE 557*                     Brief Urine Lab Results  (Last result in the past 365 days)        Color   Clarity   Blood   Leuk Est   Nitrite   Protein   CREAT   Urine HCG        12/15/24 0036 Yellow   Clear   Large (3+)   Trace   Negative   100 mg/dL (2+)                 Microbiology Results (last 10 days)       ** No results found for the last 240 hours. **          CT Abdomen Pelvis Without Contrast    Result Date: 12/15/2024  CT ABDOMEN PELVIS WO CONTRAST Date of Exam: 12/15/2024 1:20 AM EST Indication: right renal colic, nausea, bowel changes, recent colitis, h/o CKD Stage 3. Comparison: None available. Technique: Axial CT images were obtained of the abdomen and pelvis without the administration of contrast. Reconstructed coronal and sagittal images were also obtained. Automated exposure control and iterative construction methods were used. Findings: Lung Bases:   There is scarring in the lateral aspect of the right lower lobe. Liver: Liver is normal in size and CT density. No focal lesions. Biliary/Gallbladder:  The gallbladder is normal without evidence of  radiopaque stones. The biliary tree is nondilated. Spleen: Spleen is normal in size and CT density. Pancreas:  Pancreas is normal. There is no evidence of pancreatic mass or peripancreatic fluid. Kidneys:  There are postoperative changes from left nephrectomy. There are areas of scarring of the right kidney. There is no right-sided renal mass or hydronephrosis. Adrenals:  Adrenal glands are unremarkable. Retroperitoneal/Lymph Nodes/Vasculature:  No retroperitoneal adenopathy is identified. Gastrointestinal/Mesentery:  There are postoperative changes from subtotal colectomy. There are few mildly dilated loops of the residual colon anastomosis in the small bowel adjacent. The more proximal small bowel is also fluid-filled and mildly dilated but there is no complete obstruction. There is no evidence of inflammatory change of the large or small bowel. Bladder:  The bladder is normal. Genital:   There is enlargement of the prostate gland.       Bony Structures:   Visualized bony structures are consistent with the patient's age.     Impression: Impression: Postoperative changes from subtotal colectomy. There are a few mildly dilated loops of small bowel and colon but there is no complete obstruction. There is no inflammatory change of the bowel. Electronically Signed: Michael Castillo MD  12/15/2024 1:43 AM EST  Workstation ID: [id number]       Assessment & Plan   Assessment & Plan       Partial small bowel obstruction    HTN (hypertension)    Hyperlipidemia    CKD (chronic kidney disease) stage 4, GFR 15-29 ml/min    Acute kidney injury superimposed on chronic kidney disease    Pancreatitis    History of colon cancer    History of kidney cancer    SIRS (systemic inflammatory response syndrome)    Nolberto Jackson Jr. is a 69 y.o. male w/ a hx of colon cancer (s/p colectomy 2005), hx of left ureteral cancer (s/p left nephrectomy 2017), CKD Stage 4, HLD, HTN who presented to the ED w/ c/o abdominal pain.    **Partial small  "bowel obstruction   -pt c/o right sided abdominal pain, constipation and nausea for ~1.5 months; dx w/ colitis on 11/19; s/p colonoscopy ~1 wk ago (benign polyps but no other acute inflammation of the colon); pt reports \"watery\" infrequent bowel movements and feeling the urge to have a bowel movement frequently  -CT abd/pel: few mildly dilated loops of small bowel and colon but there is no complete obstruction.   -lactic acid WNL   -pt c/o persistent nausea but denies vomiting; keep NPO until nausea and pain improve   -s/p NS ~3400ml bolus given in ED  -continue NS @ 125ml/hour x 12 hours   -symptom mgt  -consider General Surgery consult if symptoms persists    **SIRS  -unclear etiology; WBC 30.99,  bpm  -lactic acid WNL   -procal, sed rate and CRP pending   -blood cx pending   -CT abd/pel without inflammatory changes noted  -UA: trace leuks, 6-10 RBCs, 3-5 WBCs, no bacteria   -s/p IV Flagyl, Vanc and Zosyn given in ED; continue Zosyn for now   -s/p ~3400ml NS bolus given in ED  -continue NS @ 125ml/hour  -repeat labs this morning     **Pancreatitis   -denies hx of known pancreatitis   -lipase 557; repeat this morning   -CT abd/pel: Pancreas is normal. There is no evidence of pancreatic mass or peripancreatic fluid.   -lipid panel pending   -s/p ~3400ml NS bolus   -continue NS @ 125ml/hour  -symptom mgt    **Acute/chronic kidney disease  -hx of left nephrectomy 2/2 left ureteral cancer (2017)  -previous creatinine 3.42 in September, 3.31 in August   -current creatinine 4.15 w/ eGFR 15.0  -IVF per above   -avoid nephrotoxic meds  -bladder scan, check PVR   -repeat labs this morning   -pt follows w/ [facility]  -consider Nephrology consult pending creatinine trend     **HTN  **HLD   -holding routine Norvasc, Coreg (SBP ~106-120)  -continue statin     **Hx of colon cancer s/p colectomy (2005)  **Hx of left ureteral cancer s/p left nephrectomy (2017)    DVT prophylaxis:  Mechanical     CODE STATUS:    Code " Status (Patient has no pulse and is not breathing): CPR (Attempt to Resuscitate)  Medical Interventions (Patient has pulse or is breathing): Full Support    Expected Discharge  Expected Discharge Date: 12/17/2024; Expected Discharge Time:     Signature: Electronically signed by RAYNE Hare, 12/15/24, 4:19 AM EST.    Time spent: 55 minutes

## 2024-12-15 NOTE — PROGRESS NOTES
"    [facility] Medicine Services  PROGRESS NOTE    Patient Name: Nolberto Jackson Jr.  : 1955  MRN: [id number]    Date of Admission: 12/15/2024  Primary Care Physician: Tammy Myers MD    Subjective   Subjective     CC:  diarrhea    HPI:  Says he \"can't poop\" -- has been taking stool softeners at home but no solid stool. Still having watery diarrhea.  No rectal or low abdominal pain.  Denies fevers      Objective   Objective     Vital Signs:   Temp:  [96.8 °F (36 °C)-98.6 °F (37 °C)] 98.6 °F (37 °C)  Heart Rate:  [] 93  Resp:  [18] 18  BP: (106-130)/(68-94) 129/85     Physical Exam:  Non toxic, in bed   MM moist  RRR  Breath sounds clear bilaterally  Abdomen soft and non tender  Alert, speech clear  Normal affect    Results Reviewed:  LAB RESULTS:      Lab 12/15/24  0145 12/15/24  0035   WBC  --  30.99*   HEMOGLOBIN  --  13.8   HEMATOCRIT  --  42.3   PLATELETS  --  274   NEUTROS ABS  --  26.32*   IMMATURE GRANS (ABS)  --  0.31*   LYMPHS ABS  --  1.84   MONOS ABS  --  1.40*   EOS ABS  --  1.00*   MCV  --  89.6   SED RATE  --  68*   CRP 3.34*  --    LACTATE  --  1.0         Lab 12/15/24  0601 12/15/24  0145   SODIUM 140 138   POTASSIUM 4.3 4.7   CHLORIDE 115* 111*   CO2 12.0* 11.0*   ANION GAP 13.0 16.0*   BUN 75* 83*   CREATININE 3.95* 4.15*   EGFR 15.9* 15.0*   GLUCOSE 115* 115*   CALCIUM 8.4* 9.7   MAGNESIUM 2.0  --          Lab 12/15/24  0601 12/15/24  0145   TOTAL PROTEIN 6.1 7.5   ALBUMIN 3.5 3.8   GLOBULIN 2.6 3.7   ALT (SGPT) 13 15   AST (SGOT) 14 17   BILIRUBIN 0.3 0.2   ALK PHOS 119* 143*   LIPASE 857* 557*             Lab 12/15/24  0601   CHOLESTEROL 102   LDL CHOL 57   HDL CHOL 21*   TRIGLYCERIDES 130             Brief Urine Lab Results  (Last result in the past 365 days)        Color   Clarity   Blood   Leuk Est   Nitrite   Protein   CREAT   Urine HCG        12/15/24 0036 Yellow   Clear   Large (3+)   Trace   Negative   100 mg/dL (2+)             "       Microbiology Results Abnormal       None            XR Chest 1 View    Result Date: 12/15/2024  Examination: XR CHEST 1 VW-  Date of Exam: 12/15/2024 5:02 AM  Indication: sepsis- unlcear etiology; A41.9-Sepsis, unspecified organism; R65.20-Severe sepsis without septic shock; N17.9-Acute kidney failure, unspecified; R10.31-Right lower quadrant pain; R19.7-Diarrhea, unspecified; R11.0-Nausea; R74.8-Abnormal levels of other serum enzymes; N17.9-Acute kidney failure, unspecified; N18.30-Chronic kidney disease, stage 3 unspecified; Z85.038-Personal history of other mal.  Comparison: Chest radiograph dated January 18, 2024  Technique: 1 view of the chest  Findings: There are no airspace consolidations. No pleural effusions. No pneumothorax. The heart size is normal. The pulmonary vasculature appears within normal limits. No acute osseous abnormality identified.      Impression: No acute cardiopulmonary process.  This report was finalized on 12/15/2024 5:33 AM by Michael Castillo MD.      CT Abdomen Pelvis Without Contrast    Result Date: 12/15/2024  CT ABDOMEN PELVIS WO CONTRAST Date of Exam: 12/15/2024 1:20 AM EST Indication: right renal colic, nausea, bowel changes, recent colitis, h/o CKD Stage 3. Comparison: None available. Technique: Axial CT images were obtained of the abdomen and pelvis without the administration of contrast. Reconstructed coronal and sagittal images were also obtained. Automated exposure control and iterative construction methods were used. Findings: Lung Bases:   There is scarring in the lateral aspect of the right lower lobe. Liver: Liver is normal in size and CT density. No focal lesions. Biliary/Gallbladder:  The gallbladder is normal without evidence of radiopaque stones. The biliary tree is nondilated. Spleen: Spleen is normal in size and CT density. Pancreas:  Pancreas is normal. There is no evidence of pancreatic mass or peripancreatic fluid. Kidneys:  There are postoperative changes  from left nephrectomy. There are areas of scarring of the right kidney. There is no right-sided renal mass or hydronephrosis. Adrenals:  Adrenal glands are unremarkable. Retroperitoneal/Lymph Nodes/Vasculature:  No retroperitoneal adenopathy is identified. Gastrointestinal/Mesentery:  There are postoperative changes from subtotal colectomy. There are few mildly dilated loops of the residual colon anastomosis in the small bowel adjacent. The more proximal small bowel is also fluid-filled and mildly dilated but there is no complete obstruction. There is no evidence of inflammatory change of the large or small bowel. Bladder:  The bladder is normal. Genital:   There is enlargement of the prostate gland.       Bony Structures:   Visualized bony structures are consistent with the patient's age.     Impression: Impression: Postoperative changes from subtotal colectomy. There are a few mildly dilated loops of small bowel and colon but there is no complete obstruction. There is no inflammatory change of the bowel. Electronically Signed: Michael Castillo MD  12/15/2024 1:43 AM EST  Workstation ID: [id number]         Current medications:  Scheduled Meds:pantoprazole, 40 mg, Intravenous, Q AM  piperacillin-tazobactam, 3.375 g, Intravenous, Q12H  pravastatin, 40 mg, Oral, Daily  sodium chloride, 10 mL, Intravenous, Q12H      Continuous Infusions:sodium chloride, 125 mL/hr, Last Rate: 125 mL/hr (12/15/24 0744)      PRN Meds:.  HYDROmorphone    melatonin    ondansetron    Sodium Chloride (PF)    sodium chloride    sodium chloride    Assessment & Plan   Assessment & Plan     Active Hospital Problems    Diagnosis  POA    **Partial small bowel obstruction [K56.600]  Yes    CKD (chronic kidney disease) stage 4, GFR 15-29 ml/min [N18.4]  Yes    Acute kidney injury superimposed on chronic kidney disease [N17.9, N18.9]  Yes    Pancreatitis [K85.90]  Yes    History of colon cancer [Z85.038]  Yes    History of kidney cancer [Z85.528]  Not  Applicable    SIRS (systemic inflammatory response syndrome) [R65.10]  Yes    Hyperlipidemia [E78.5]  Yes    HTN (hypertension) [I10]  Yes      Resolved Hospital Problems   No resolved problems to display.        Brief Hospital Course to date:  Nolberto Jackson Jr. is a 69 y.o. male with history of colon cancer s/p right hemicolectomy (2005), left ureteral cancer s/p left nephrectomy (2017), CKD IV, HLD, HTN, who presented with abdominal pain and watery diarrhea x 4-6 weeks.      Leukocytosis  Diarrhea  Pancreatitis  - recent sigmoid/rectal polypectomy 11/27/24 (Dr Pal).  - provided linzess for constipation and anusol for hemorrhoids by his gastroenterologist  - CT abdomen pelvis on admission shows a few mildly dilated loops of small bowel and colon but no complete obstruction. No bowel inflammation noted.  - denies recent antibiotic use  - no alcohol use, normal LFTs,   - check C diff and GI panel PCRs  - IV fluid hydration  - on Zosyn, consider discontinuation if no infectious source found    TAM on CKD IV  - h/o left nephrectomy  - appears baseline creatinine 2.5-2.9, with some higher values recently  - IV fluid hydration  - bmp am -- if no significant improvement with hydration consider nephrology consultaiton    HTN  - home coreg and norvasc held    HLD  - statin    Expected Discharge Location and Transportation: home  Expected Discharge   Expected Discharge Date: 12/17/2024; Expected Discharge Time:      VTE Prophylaxis:  Mechanical VTE prophylaxis orders are present.         AM-PAC 6 Clicks Score (PT): 24 (12/15/24 0401)    CODE STATUS:   Code Status and Medical Interventions: CPR (Attempt to Resuscitate); Full Support   Ordered at: 12/15/24 0350     Code Status (Patient has no pulse and is not breathing):    CPR (Attempt to Resuscitate)     Medical Interventions (Patient has pulse or is breathing):    Full Support       Du Pollack MD  12/15/24

## 2024-12-15 NOTE — ED PROVIDER NOTES
Subjective   History of Present Illness  This is a 69-year-old male that presents to the ER with recurrent GI symptoms, abdominal pain, diarrhea, nausea, with history of previous colon cancer and left ureteral and renal cancer status post left ectomy and removal of left ureter.  Patient says that this is his third ER visit in the last 4 weeks.  He was seen initially at [facility] on 11/3/24 and diagnosed with acute urinary tract infection.  CT imaging revealed bladder wall thickening as well as lobulated prostate.  Patient was prescribed 7-day course of antibiotics and he completed that.  He continued to have abdominal pain and difficulty having bowel movements and was seen again at [facility] ER on 11/19/24 and repeat CT imaging revealed multifocal areas of mural thickening in the sigmoid colon which may represent mild colitis.  Radiologist recommended considering follow-up colonoscopy.  Patient was prescribed MiraLAX and was also taking lactulose on discharge.  Wife and patient states that patient did undergo colonoscopy through [facility] approximately 2 weeks ago.  I do not see that procedure note in epic, but patient says he was told that there was no acute inflammatory changes and he had some benign polyps removed.  Patient is not able to rest secondary to persistent abdominal pain, and at present, pain has localized to the right abdomen, especially right lower quadrant with radiation to the right lower back and right CVA region.  Pain is sharp in nature.  Patient is having watery stools with use of lactulose.  He denies any fever.  He does report nausea without vomiting.  He denies any dysuria, urgency, or frequency.  He has contacted his routine urologist, Dr. Olivia, and has close follow-up on 12/19/2024, but due to increased pain tonight, patient said he could not wait.  Previous abdominal surgeries include colectomy due to colon cancer, left nephrectomy and removal of left  ureter.  Patient is former smoker.  He denies alcohol use.    History provided by:  Patient and spouse  Abdominal Pain  Pain location:  RLQ  Pain quality: sharp    Pain radiates to:  Back (Right CVA and and right lower back)  Duration:  4 weeks  Timing:  Constant  Progression:  Unchanged  Chronicity:  Chronic  Context: previous surgery (History of colon cancer with re-section and left ureter and left kidney removed due to cancer.)    Context: not alcohol use    Context comment:  Recurrent right sided abd pain with nausea, bowel changes. Seen at [facility] 11/3/24 and dx with UTI. Abx and no better. Dx with Colitis 11/19/24 and had colonoscopy <2wks ago; benign polyps.  Relieved by:  Nothing  Worsened by:  Eating  Ineffective treatments: Lactulose Rx. Pt just finished second abx for colitis from 11/19/24. Pt can't remember abx names.  Associated symptoms: anorexia, diarrhea and nausea    Associated symptoms: no belching, no chest pain, no chills, no constipation, no cough, no dysuria, no fatigue, no fever, no hematochezia, no hematuria, no shortness of breath, no sore throat and no vomiting    Risk factors: multiple surgeries    Risk factors comment:  Pt has close f/u with Dr. Olivia his urologist on 12/19/24.      Review of Systems   Constitutional:  Positive for appetite change. Negative for chills, fatigue and fever.   HENT: Negative.  Negative for congestion, postnasal drip, rhinorrhea, sinus pressure, sinus pain, sneezing and sore throat.    Respiratory: Negative.  Negative for cough and shortness of breath.    Cardiovascular: Negative.  Negative for chest pain, palpitations and leg swelling.   Gastrointestinal:  Positive for abdominal pain (Sharp right lower quadrant abdominal pain with radiation to right lower back), anorexia, diarrhea and nausea. Negative for blood in stool, constipation, hematochezia and vomiting.        History of colon cancer status post colectomy.  Patient diagnosed with  recent colitis at [facility] ER on 11/19/24 and had colonoscopy approximately 2 weeks ago.  He denies any inflammatory changes in colon and says he had benign polyps removed.  I do not see that procedure note in epic.   Genitourinary: Negative.  Negative for decreased urine volume, dysuria, flank pain, frequency, hematuria and urgency.        Diagnosed with cystitis in the ER at [facility] on 11/3/24.  Patient denies any dysuria, urgency, frequency, or sensation of incomplete bladder emptying   Musculoskeletal:  Positive for back pain (right lower back pain).   Neurological: Negative.  Negative for dizziness, light-headedness and headaches.   All other systems reviewed and are negative.      Past Medical History:   Diagnosis Date    Colon polyp     HX OF    Dialysis patient     NO DILAYSIS SINCE 2009    Fistula     LEFT ARM, DOES NOT WORK    Glenohumeral arthritis, right 02/01/2024    Gout     Hard of hearing     Hearing aid worn     RIGHT EAR    History of hepatitis C     treated 2009    History of kidney cancer 2017    LEFT KIDNEY    History of transfusion     PATIENT DENIES REACTION    Hyperlipidemia     Hypertension     Liver cirrhosis     MRSA infection 2005    D/T COLON CANCER SURGERY    Status post total shoulder arthroplasty, right 02/01/2024    Wears dentures     UPPER AND LOWER       Allergies   Allergen Reactions    Shellfish Allergy Unknown - Low Severity    Diphenhydramine Hcl Delirium    Midazolam Other (See Comments)     Other reaction(s): N+V - Nausea and vomiting, N+V - Nausea and vomiting    Zolpidem Delirium     Other reaction(s): nightmares, nightmares       Past Surgical History:   Procedure Laterality Date    COLECTOMY PARTIAL / TOTAL Left     2005, 2006    COLONOSCOPY      NEPHRECTOMY Left     TOTAL SHOULDER ARTHROPLASTY Right 2/1/2024    Procedure: TOTAL SHOULDER ARTHROPLASTY - RIGHT LEFT SHOULDER STEROID INJECTION;  Surgeon: Jhon Fang MD;  Location: [facility];  Service:  Orthopedics;  Laterality: Right;    TOTAL SHOULDER ARTHROPLASTY W/ DISTAL CLAVICLE EXCISION Left 2024    Procedure: TOTAL SHOULDER ARTHROPLASTY LEFT;  Surgeon: Jhon Fang MD;  Location: [facility];  Service: Orthopedics;  Laterality: Left;    URETERECTOMY Left     cancer       Family History   Problem Relation Age of Onset    No Known Problems Mother     Pancreatic cancer Father        Social History     Socioeconomic History    Marital status:    Tobacco Use    Smoking status: Former     Current packs/day: 0.00     Types: Cigarettes     Quit date:      Years since quittin.9    Smokeless tobacco: Never   Vaping Use    Vaping status: Never Used   Substance and Sexual Activity    Alcohol use: Not Currently     Comment: rare    Drug use: Not Currently    Sexual activity: Defer           Objective   Physical Exam  Vitals and nursing note reviewed.   Constitutional:       General: He is not in acute distress.     Appearance: Normal appearance. He is ill-appearing. He is not toxic-appearing or diaphoretic.      Comments: Patient appears mildly ill and uncomfortable secondary to acute abdominal pain.  Nontoxic.  No acute distress.   HENT:      Head: Normocephalic and atraumatic.      Nose: Nose normal.      Mouth/Throat:      Mouth: Mucous membranes are moist.      Pharynx: Oropharynx is clear.      Comments: Oral mucous membranes are still moist.  Posterior pharynx is not erythematous  Eyes:      Extraocular Movements: Extraocular movements intact.      Conjunctiva/sclera: Conjunctivae normal.      Pupils: Pupils are equal, round, and reactive to light.   Cardiovascular:      Rate and Rhythm: Regular rhythm. Tachycardia present. No extrasystoles are present.     Pulses: Normal pulses.      Heart sounds: Normal heart sounds.      Comments: Mild tachycardia.  No ectopy.  No pedal edema to lower extremities  Pulmonary:      Effort: Pulmonary effort is normal.      Breath sounds: Normal  breath sounds.      Comments: Lungs are clear to auscultation bilaterally  Abdominal:      General: Bowel sounds are increased. There is no distension.      Palpations: Abdomen is soft.      Tenderness: There is abdominal tenderness in the right lower quadrant and periumbilical area. There is no right CVA tenderness, left CVA tenderness, guarding or rebound. Negative signs include Orozco's sign, Rovsing's sign and McBurney's sign.      Comments: Abdomen soft with hyperactive bowel sounds.  Scarring noted from previous multiple abdominal surgeries.  Tenderness to periumbilical region and right lower quadrant.  Negative McBurney sign.  No rebound or guarding.  Mild right CVA tenderness.  No left upper quadrant or left lower quadrant tenderness.  Abdominal exam is nonsurgical.   Musculoskeletal:         General: Normal range of motion.      Cervical back: Normal range of motion and neck supple.      Right lower leg: No edema.      Left lower leg: No edema.   Skin:     General: Skin is warm and dry.   Neurological:      General: No focal deficit present.      Mental Status: He is alert and oriented to person, place, and time.      Cranial Nerves: Cranial nerves 2-12 are intact.      Sensory: Sensation is intact.      Motor: Motor function is intact.      Coordination: Coordination is intact.      Gait: Gait is intact.      Comments: Neuro in-tact and non-focal.   Psychiatric:         Mood and Affect: Affect normal. Mood is anxious.         Speech: Speech normal.         Behavior: Behavior normal. Behavior is cooperative.         Thought Content: Thought content normal.         Cognition and Memory: Cognition and memory normal.         Judgment: Judgment normal.      Comments: Anxious and uncomfortable secondary to pain.         Procedures           ED Course  ED Course as of 12/15/24 0403   Sun Dec 15, 2024   0246 Patient has had GI symptoms and abdominal pain and back pain and some urinary changes over the last 4  weeks.  He was seen and evaluated at [facility] ER on 11/3/2024 and diagnosed with acute urinary tract infection and CT imaging there showed thick-walled urinary bladder and 5.3 cm prostate with irregular contour and lobulations.  Patient was treated with 7-day course of antibiotics, but he cannot remember the name of the antibiotic.  He continued to have pain and went back to [facility] ER.  Patient had repeat CT imaging they are on 11/19/2023 which revealed multifocal areas of mural thickening in the sigmoid colon which may represent mild colitis.  Patient was discharged home with MiraLAX and lactulose.  He has been having watery stools ever since and persistent abdominal pain.  He has increased pain to the right abdomen that radiates to the right back tonight.  Patient is afebrile but CBC shows significant leukocytosis at 30,000.  Chemistries reveal BUN and creatinine 83 and 4.15.  Patient has stage III chronic kidney disease and baseline creatinine is around 2.  LFTs were stable other than mild bump in alk phos at 153.  Lipase was 557.  Urinalysis reveals large 3+ blood, proteinuria, negative nitrite, 3-5 white blood cells and no bacteria.  CT imaging tonight was repeated without contrast which reveals postoperative changes from subtotal colectomy.  A few mildly dilated loops of small bowel and colon but there is no complete obstruction and no inflammatory change of the bowel.  Pancreas also appeared normal.  Dr. Davis, ER attending physician reviewed all diagnostic workup with me and we initiated sepsis fluid bolus per protocol as well as Flagyl, Zosyn, and vancomycin.  Abdominal exam is nonsurgical.  We will page hospitalist to discuss admission and updated patient and wife on all results. Discussed the case with Dr. Gonzalez, hospitalist, and she is agreeable to admission on telemetry. [FC]   3598 I updated patient on all results and need for admission, and he is agreeable. He says abdominal  pain is stable at this time and his abdominal exam is benign. Pt has not had any nausea or vomiting during the entire ER course. Vital signs are stable. [FC]      ED Course User Index  [FC] Diana Kuo PA-C                                           Recent Results (from the past 24 hours)   Lactic Acid, Plasma    Collection Time: 12/15/24 12:35 AM    Specimen: Blood   Result Value Ref Range    Lactate 1.0 0.5 - 2.0 mmol/L   Green Top (Gel)    Collection Time: 12/15/24 12:35 AM   Result Value Ref Range    Extra Tube Hold for add-ons.    Lavender Top    Collection Time: 12/15/24 12:35 AM   Result Value Ref Range    Extra Tube hold for add-on    Gold Top - SST    Collection Time: 12/15/24 12:35 AM   Result Value Ref Range    Extra Tube Hold for add-ons.    Gray Top    Collection Time: 12/15/24 12:35 AM   Result Value Ref Range    Extra Tube Hold for add-ons.    Light Blue Top    Collection Time: 12/15/24 12:35 AM   Result Value Ref Range    Extra Tube Hold for add-ons.    CBC Auto Differential    Collection Time: 12/15/24 12:35 AM    Specimen: Blood   Result Value Ref Range    WBC 30.99 (C) 3.40 - 10.80 10*3/mm3    RBC 4.72 4.14 - 5.80 10*6/mm3    Hemoglobin 13.8 13.0 - 17.7 g/dL    Hematocrit 42.3 37.5 - 51.0 %    MCV 89.6 79.0 - 97.0 fL    MCH 29.2 26.6 - 33.0 pg    MCHC 32.6 31.5 - 35.7 g/dL    RDW 13.9 12.3 - 15.4 %    RDW-SD 45.5 37.0 - 54.0 fl    MPV 10.1 6.0 - 12.0 fL    Platelets 274 140 - 450 10*3/mm3    Neutrophil % 85.0 (H) 42.7 - 76.0 %    Lymphocyte % 5.9 (L) 19.6 - 45.3 %    Monocyte % 4.5 (L) 5.0 - 12.0 %    Eosinophil % 3.2 0.3 - 6.2 %    Basophil % 0.4 0.0 - 1.5 %    Immature Grans % 1.0 (H) 0.0 - 0.5 %    Neutrophils, Absolute 26.32 (H) 1.70 - 7.00 10*3/mm3    Lymphocytes, Absolute 1.84 0.70 - 3.10 10*3/mm3    Monocytes, Absolute 1.40 (H) 0.10 - 0.90 10*3/mm3    Eosinophils, Absolute 1.00 (H) 0.00 - 0.40 10*3/mm3    Basophils, Absolute 0.12 0.00 - 0.20 10*3/mm3    Immature Grans, Absolute 0.31 (H)  0.00 - 0.05 10*3/mm3    nRBC 0.0 0.0 - 0.2 /100 WBC   Scan Slide    Collection Time: 12/15/24 12:35 AM    Specimen: Blood   Result Value Ref Range    RBC Morphology Normal Normal    WBC Morphology Normal Normal    Platelet Morphology Normal Normal   Urinalysis With Microscopic If Indicated (No Culture) - Urine, Clean Catch    Collection Time: 12/15/24 12:36 AM    Specimen: Urine, Clean Catch   Result Value Ref Range    Color, UA Yellow Yellow, Straw    Appearance, UA Clear Clear    pH, UA <=5.0 5.0 - 8.0    Specific Gravity, UA 1.019 1.001 - 1.030    Glucose, UA Negative Negative    Ketones, UA Negative Negative    Bilirubin, UA Negative Negative    Blood, UA Large (3+) (A) Negative    Protein,  mg/dL (2+) (A) Negative    Leuk Esterase, UA Trace (A) Negative    Nitrite, UA Negative Negative    Urobilinogen, UA 0.2 E.U./dL 0.2 - 1.0 E.U./dL   Urinalysis, Microscopic Only - Urine, Clean Catch    Collection Time: 12/15/24 12:36 AM    Specimen: Urine, Clean Catch   Result Value Ref Range    RBC, UA 6-10 (A) None Seen, 0-2 /HPF    WBC, UA 3-5 (A) None Seen, 0-2 /HPF    Bacteria, UA None Seen None Seen, Trace /HPF    Squamous Epithelial Cells, UA 0-2 None Seen, 0-2 /HPF    Hyaline Casts, UA 0-6 0 - 6 /LPF    Granular Casts, UA 0-2 None Seen /LPF    Methodology Manual Light Microscopy    Comprehensive Metabolic Panel    Collection Time: 12/15/24  1:45 AM    Specimen: Blood   Result Value Ref Range    Glucose 115 (H) 65 - 99 mg/dL    BUN 83 (H) 8 - 23 mg/dL    Creatinine 4.15 (H) 0.76 - 1.27 mg/dL    Sodium 138 136 - 145 mmol/L    Potassium 4.7 3.5 - 5.2 mmol/L    Chloride 111 (H) 98 - 107 mmol/L    CO2 11.0 (L) 22.0 - 29.0 mmol/L    Calcium 9.7 8.6 - 10.5 mg/dL    Total Protein 7.5 6.0 - 8.5 g/dL    Albumin 3.8 3.5 - 5.2 g/dL    ALT (SGPT) 15 1 - 41 U/L    AST (SGOT) 17 1 - 40 U/L    Alkaline Phosphatase 143 (H) 39 - 117 U/L    Total Bilirubin 0.2 0.0 - 1.2 mg/dL    Globulin 3.7 gm/dL    A/G Ratio 1.0 g/dL     BUN/Creatinine Ratio 20.0 7.0 - 25.0    Anion Gap 16.0 (H) 5.0 - 15.0 mmol/L    eGFR 15.0 (L) >60.0 mL/min/1.73   Lipase    Collection Time: 12/15/24  1:45 AM    Specimen: Blood   Result Value Ref Range    Lipase 557 (H) 13 - 60 U/L     Note: In addition to lab results from this visit, the labs listed above may include labs taken at another facility or during a different encounter within the last 24 hours. Please correlate lab times with ED admission and discharge times for further clarification of the services performed during this visit.    CT Abdomen Pelvis Without Contrast   Final Result   Impression:   Postoperative changes from subtotal colectomy. There are a few mildly dilated loops of small bowel and colon but there is no complete obstruction. There is no inflammatory change of the bowel.            Electronically Signed: Michael Castillo MD     12/15/2024 1:43 AM EST     Workstation ID: [id number]        Vitals:    12/15/24 0108 12/15/24 0112 12/15/24 0113 12/15/24 0117   BP:       Pulse: 87 88 87 88   Resp:       Temp:       SpO2: 96% 97%  96%   Weight:       Height:         Medications   Sodium Chloride (PF) 0.9 % 10 mL (has no administration in time range)   vancomycin IVPB 1500 mg in 0.9% NaCl (Premix) 500 mL (1,500 mg Intravenous New Bag 12/15/24 0309)   pravastatin (PRAVACHOL) tablet 40 mg (has no administration in time range)   sodium chloride 0.9 % flush 10 mL (has no administration in time range)   sodium chloride 0.9 % flush 10 mL (has no administration in time range)   sodium chloride 0.9 % infusion 40 mL (has no administration in time range)   piperacillin-tazobactam (ZOSYN) 3.375 g IVPB in 100 mL NS MBP (CD) (has no administration in time range)   sodium chloride 0.9 % infusion (has no administration in time range)   ondansetron (ZOFRAN) injection 4 mg (has no administration in time range)   melatonin tablet 5 mg (has no administration in time range)   HYDROmorphone (DILAUDID) injection 0.5 mg (has  no administration in time range)   sodium chloride 0.9 % bolus 1,000 mL (0 mL Intravenous Stopped 12/15/24 0209)   ondansetron (ZOFRAN) injection 4 mg (4 mg Intravenous Given 12/15/24 0049)   HYDROmorphone (DILAUDID) injection 0.5 mg (0.5 mg Intravenous Given 12/15/24 0049)   sepsis fluid NS 0.9 % bolus 2,382 mL (2,382 mL Intravenous New Bag 12/15/24 0148)   piperacillin-tazobactam (ZOSYN) 3.375 g IVPB in 100 mL NS MBP (CD) (0 g Intravenous Stopped 12/15/24 0257)   metroNIDAZOLE (FLAGYL) IVPB 500 mg (0 mg Intravenous Stopped 12/15/24 0309)     ECG/EMG Results (last 24 hours)       ** No results found for the last 24 hours. **          ECG 12 Lead QT Measurement    (Results Pending)                     Medical Decision Making      Final diagnoses:   Sepsis with acute renal failure, due to unspecified organism, unspecified acute renal failure type, unspecified whether septic shock present   Right lower quadrant abdominal pain   Diarrhea, unspecified type   Nausea   Elevated lipase   Acute renal failure superimposed on stage 3 chronic kidney disease, unspecified acute renal failure type, unspecified whether stage 3a or 3b CKD   History of colon cancer   History of renal cell cancer   History of hypertension       ED Disposition  ED Disposition       ED Disposition   Decision to Admit    Condition   --    Comment   Level of Care: Telemetry [5]   Diagnosis: Partial small bowel obstruction [882001]   Is patient appropriate for Inpatient Observation Unit?: Yes [1]                 No follow-up provider specified.       Medication List      No changes were made to your prescriptions during this visit.            Diana Kuo PA-C  12/15/24 0665

## 2024-12-16 LAB
ANION GAP SERPL CALCULATED.3IONS-SCNC: 12 MMOL/L (ref 5–15)
BASOPHILS # BLD AUTO: 0.04 10*3/MM3 (ref 0–0.2)
BASOPHILS NFR BLD AUTO: 0.5 % (ref 0–1.5)
BUN SERPL-MCNC: 59 MG/DL (ref 8–23)
BUN/CREAT SERPL: 16.3 (ref 7–25)
CALCIUM SPEC-SCNC: 9 MG/DL (ref 8.6–10.5)
CHLORIDE SERPL-SCNC: 120 MMOL/L (ref 98–107)
CO2 SERPL-SCNC: 10 MMOL/L (ref 22–29)
CREAT SERPL-MCNC: 3.62 MG/DL (ref 0.76–1.27)
DEPRECATED RDW RBC AUTO: 49.1 FL (ref 37–54)
EGFRCR SERPLBLD CKD-EPI 2021: 17.6 ML/MIN/1.73
EOSINOPHIL # BLD AUTO: 0.33 10*3/MM3 (ref 0–0.4)
EOSINOPHIL NFR BLD AUTO: 4 % (ref 0.3–6.2)
ERYTHROCYTE [DISTWIDTH] IN BLOOD BY AUTOMATED COUNT: 14.2 % (ref 12.3–15.4)
GLUCOSE SERPL-MCNC: 105 MG/DL (ref 65–99)
HCT VFR BLD AUTO: 33.1 % (ref 37.5–51)
HGB BLD-MCNC: 10.1 G/DL (ref 13–17.7)
IMM GRANULOCYTES # BLD AUTO: 0.04 10*3/MM3 (ref 0–0.05)
IMM GRANULOCYTES NFR BLD AUTO: 0.5 % (ref 0–0.5)
LYMPHOCYTES # BLD AUTO: 0.36 10*3/MM3 (ref 0.7–3.1)
LYMPHOCYTES NFR BLD AUTO: 4.3 % (ref 19.6–45.3)
MCH RBC QN AUTO: 28.9 PG (ref 26.6–33)
MCHC RBC AUTO-ENTMCNC: 30.5 G/DL (ref 31.5–35.7)
MCV RBC AUTO: 94.6 FL (ref 79–97)
MONOCYTES # BLD AUTO: 0.4 10*3/MM3 (ref 0.1–0.9)
MONOCYTES NFR BLD AUTO: 4.8 % (ref 5–12)
NEUTROPHILS NFR BLD AUTO: 7.13 10*3/MM3 (ref 1.7–7)
NEUTROPHILS NFR BLD AUTO: 85.9 % (ref 42.7–76)
NRBC BLD AUTO-RTO: 0 /100 WBC (ref 0–0.2)
PLATELET # BLD AUTO: 109 10*3/MM3 (ref 140–450)
PMV BLD AUTO: 10.1 FL (ref 6–12)
POTASSIUM SERPL-SCNC: 4.4 MMOL/L (ref 3.5–5.2)
RBC # BLD AUTO: 3.5 10*6/MM3 (ref 4.14–5.8)
SODIUM SERPL-SCNC: 142 MMOL/L (ref 136–145)
WBC NRBC COR # BLD AUTO: 8.3 10*3/MM3 (ref 3.4–10.8)

## 2024-12-16 PROCEDURE — 25810000003 SODIUM CHLORIDE 0.9 % SOLUTION: Performed by: INTERNAL MEDICINE

## 2024-12-16 PROCEDURE — 80048 BASIC METABOLIC PNL TOTAL CA: CPT | Performed by: INTERNAL MEDICINE

## 2024-12-16 PROCEDURE — 85025 COMPLETE CBC W/AUTO DIFF WBC: CPT | Performed by: INTERNAL MEDICINE

## 2024-12-16 PROCEDURE — 25010000002 PIPERACILLIN SOD-TAZOBACTAM PER 1 G: Performed by: NURSE PRACTITIONER

## 2024-12-16 PROCEDURE — 99232 SBSQ HOSP IP/OBS MODERATE 35: CPT | Performed by: PEDIATRICS

## 2024-12-16 RX ORDER — CARVEDILOL 6.25 MG/1
6.25 TABLET ORAL 2 TIMES DAILY WITH MEALS
Status: DISCONTINUED | OUTPATIENT
Start: 2024-12-16 | End: 2024-12-17 | Stop reason: HOSPADM

## 2024-12-16 RX ORDER — OXYCODONE HYDROCHLORIDE 5 MG/1
5 TABLET ORAL EVERY 4 HOURS PRN
Status: DISCONTINUED | OUTPATIENT
Start: 2024-12-16 | End: 2024-12-17 | Stop reason: HOSPADM

## 2024-12-16 RX ADMIN — PRAVASTATIN SODIUM 40 MG: 40 TABLET ORAL at 08:31

## 2024-12-16 RX ADMIN — Medication 10 ML: at 08:34

## 2024-12-16 RX ADMIN — PIPERACILLIN AND TAZOBACTAM 3.38 G: 3; .375 INJECTION, POWDER, LYOPHILIZED, FOR SOLUTION INTRAVENOUS; PARENTERAL at 23:33

## 2024-12-16 RX ADMIN — AVOBENZONE, HOMOSALATE, OCTISALATE, OCTOCRYLENE, AND OXYBENZONE 1 PACKET: 29.4; 147; 49; 25.4; 58.8 LOTION TOPICAL at 08:33

## 2024-12-16 RX ADMIN — PANTOPRAZOLE SODIUM 40 MG: 40 INJECTION, POWDER, LYOPHILIZED, FOR SOLUTION INTRAVENOUS at 04:55

## 2024-12-16 RX ADMIN — PIPERACILLIN AND TAZOBACTAM 3.38 G: 3; .375 INJECTION, POWDER, LYOPHILIZED, FOR SOLUTION INTRAVENOUS; PARENTERAL at 13:00

## 2024-12-16 RX ADMIN — POLYETHYLENE GLYCOL 3350 17 G: 17 POWDER, FOR SOLUTION ORAL at 08:31

## 2024-12-16 RX ADMIN — Medication 10 ML: at 20:35

## 2024-12-16 RX ADMIN — CARVEDILOL 6.25 MG: 6.25 TABLET, FILM COATED ORAL at 18:24

## 2024-12-16 RX ADMIN — SODIUM CHLORIDE 125 ML/HR: 9 INJECTION, SOLUTION INTRAVENOUS at 04:52

## 2024-12-16 RX ADMIN — OXYCODONE 5 MG: 5 TABLET ORAL at 18:28

## 2024-12-16 NOTE — PROGRESS NOTES
[facility] Medicine Services  PROGRESS NOTE    Patient Name: Nolberto Jackson Jr.  : 1955  MRN: [id number]    Date of Admission: 12/15/2024  Primary Care Physician: Tammy Myers MD    Subjective   Subjective     CC:  diarrhea    HPI:  Tolerated liquid diet.  Small soft BM.  Abd feels better.  Wife states he is still having pain but he doesn't want to take pain meds.      Objective   Objective     Vital Signs:   Temp:  [97.5 °F (36.4 °C)-98.1 °F (36.7 °C)] 98 °F (36.7 °C)  Heart Rate:  [89-99] 91  Resp:  [17-18] 18  BP: (131-139)/(72-84) 139/84     Physical Exam:  Non toxic, in bed, very talkative and interactive  MM moist  RRR  Breath sounds clear bilaterally  Abdomen soft and non tender  Alert, speech slightly dysarthric, but this is baseline per family  Normal affect    Results Reviewed:  LAB RESULTS:      Lab 12/16/24  0421 12/15/24  1136 12/15/24  0145 12/15/24  0035   WBC 8.30 13.01*  --  30.99*   HEMOGLOBIN 10.1* 10.4*  --  13.8   HEMATOCRIT 33.1* 33.3*  --  42.3   PLATELETS 109* 130*  --  274   NEUTROS ABS 7.13* 11.74*  --  26.32*   IMMATURE GRANS (ABS) 0.04 0.10*  --  0.31*   LYMPHS ABS 0.36* 0.31*  --  1.84   MONOS ABS 0.40 0.53  --  1.40*   EOS ABS 0.33 0.28  --  1.00*   MCV 94.6 92.5  --  89.6   SED RATE  --   --   --  68*   CRP  --   --  3.34*  --    LACTATE  --   --   --  1.0         Lab 24  0421 12/15/24  0601 12/15/24  0145   SODIUM 142 140 138   POTASSIUM 4.4 4.3 4.7   CHLORIDE 120* 115* 111*   CO2 10.0* 12.0* 11.0*   ANION GAP 12.0 13.0 16.0*   BUN 59* 75* 83*   CREATININE 3.62* 3.95* 4.15*   EGFR 17.6* 15.9* 15.0*   GLUCOSE 105* 115* 115*   CALCIUM 9.0 8.4* 9.7   MAGNESIUM  --  2.0  --          Lab 12/15/24  0601 12/15/24  0145   TOTAL PROTEIN 6.1 7.5   ALBUMIN 3.5 3.8   GLOBULIN 2.6 3.7   ALT (SGPT) 13 15   AST (SGOT) 14 17   BILIRUBIN 0.3 0.2   ALK PHOS 119* 143*   LIPASE 857* 557*             Lab 12/15/24  0601   CHOLESTEROL 102   LDL CHOL 57    HDL CHOL 21*   TRIGLYCERIDES 130             Brief Urine Lab Results  (Last result in the past 365 days)        Color   Clarity   Blood   Leuk Est   Nitrite   Protein   CREAT   Urine HCG        12/15/24 0036 Yellow   Clear   Large (3+)   Trace   Negative   100 mg/dL (2+)                   Microbiology Results Abnormal       None            XR Chest 1 View    Result Date: 12/15/2024  Examination: XR CHEST 1 VW-  Date of Exam: 12/15/2024 5:02 AM  Indication: sepsis- unlcear etiology; A41.9-Sepsis, unspecified organism; R65.20-Severe sepsis without septic shock; N17.9-Acute kidney failure, unspecified; R10.31-Right lower quadrant pain; R19.7-Diarrhea, unspecified; R11.0-Nausea; R74.8-Abnormal levels of other serum enzymes; N17.9-Acute kidney failure, unspecified; N18.30-Chronic kidney disease, stage 3 unspecified; Z85.038-Personal history of other mal.  Comparison: Chest radiograph dated January 18, 2024  Technique: 1 view of the chest  Findings: There are no airspace consolidations. No pleural effusions. No pneumothorax. The heart size is normal. The pulmonary vasculature appears within normal limits. No acute osseous abnormality identified.      Impression: No acute cardiopulmonary process.  This report was finalized on 12/15/2024 5:33 AM by Michael Castillo MD.      CT Abdomen Pelvis Without Contrast    Result Date: 12/15/2024  CT ABDOMEN PELVIS WO CONTRAST Date of Exam: 12/15/2024 1:20 AM EST Indication: right renal colic, nausea, bowel changes, recent colitis, h/o CKD Stage 3. Comparison: None available. Technique: Axial CT images were obtained of the abdomen and pelvis without the administration of contrast. Reconstructed coronal and sagittal images were also obtained. Automated exposure control and iterative construction methods were used. Findings: Lung Bases:   There is scarring in the lateral aspect of the right lower lobe. Liver: Liver is normal in size and CT density. No focal lesions. Biliary/Gallbladder:   The gallbladder is normal without evidence of radiopaque stones. The biliary tree is nondilated. Spleen: Spleen is normal in size and CT density. Pancreas:  Pancreas is normal. There is no evidence of pancreatic mass or peripancreatic fluid. Kidneys:  There are postoperative changes from left nephrectomy. There are areas of scarring of the right kidney. There is no right-sided renal mass or hydronephrosis. Adrenals:  Adrenal glands are unremarkable. Retroperitoneal/Lymph Nodes/Vasculature:  No retroperitoneal adenopathy is identified. Gastrointestinal/Mesentery:  There are postoperative changes from subtotal colectomy. There are few mildly dilated loops of the residual colon anastomosis in the small bowel adjacent. The more proximal small bowel is also fluid-filled and mildly dilated but there is no complete obstruction. There is no evidence of inflammatory change of the large or small bowel. Bladder:  The bladder is normal. Genital:   There is enlargement of the prostate gland.       Bony Structures:   Visualized bony structures are consistent with the patient's age.     Impression: Impression: Postoperative changes from subtotal colectomy. There are a few mildly dilated loops of small bowel and colon but there is no complete obstruction. There is no inflammatory change of the bowel. Electronically Signed: Michael Castillo MD  12/15/2024 1:43 AM EST  Workstation ID: [id number]         Current medications:  Scheduled Meds:hydrocortisone, 25 mg, Rectal, BID  pantoprazole, 40 mg, Intravenous, Q AM  piperacillin-tazobactam, 3.375 g, Intravenous, Q12H  polyethylene glycol, 17 g, Oral, Daily  pravastatin, 40 mg, Oral, Daily  Psyllium, 1 packet, Oral, Daily  sodium chloride, 10 mL, Intravenous, Q12H      Continuous Infusions:     PRN Meds:.  melatonin    ondansetron    Sodium Chloride (PF)    sodium chloride    sodium chloride    Assessment & Plan   Assessment & Plan     Active Hospital Problems    Diagnosis  POA    **Partial  small bowel obstruction [K56.600]  Yes    CKD (chronic kidney disease) stage 4, GFR 15-29 ml/min [N18.4]  Yes    Acute kidney injury superimposed on chronic kidney disease [N17.9, N18.9]  Yes    Pancreatitis [K85.90]  Yes    History of colon cancer [Z85.038]  Yes    History of kidney cancer [Z85.528]  Not Applicable    SIRS (systemic inflammatory response syndrome) [R65.10]  Yes    Hyperlipidemia [E78.5]  Yes    HTN (hypertension) [I10]  Yes      Resolved Hospital Problems   No resolved problems to display.        Brief Hospital Course to date:  Nolberto Jackson Jr. is a 69 y.o. male with history of colon cancer s/p right hemicolectomy (2005), left ureteral cancer s/p left nephrectomy (2017), CKD IV, HLD, HTN, who presented with abdominal pain and watery diarrhea x 4-6 weeks.      Leukocytosis  Diarrhea  Pancreatitis  Partial SBO  - denies recent antibiotic use  - Neg C diff and GI panel PCRs  - IV fluid hydration  - on Zosyn, consider discontinuation if no infectious source found--plan to d/c tomorrow  --Advance to Full liquid diet today and advance as tolerated in AM  --Appreciate GI input    TAM on CKD IV  - h/o left nephrectomy  - appears baseline creatinine 2.5-2.9, with some higher values recently  - IV fluid hydration with improvement in Cr    HTN  - restart coreg    HLD  - statin    Expected Discharge Location and Transportation: home  Expected Discharge   Expected Discharge Date: 12/17/2024; Expected Discharge Time:      VTE Prophylaxis:  Mechanical VTE prophylaxis orders are present.         AM-PAC 6 Clicks Score (PT): 24 (12/16/24 0630)    CODE STATUS:   Code Status and Medical Interventions: CPR (Attempt to Resuscitate); Full Support   Ordered at: 12/15/24 0350     Code Status (Patient has no pulse and is not breathing):    CPR (Attempt to Resuscitate)     Medical Interventions (Patient has pulse or is breathing):    Full Support       Arianna Dodd MD  12/16/24

## 2024-12-16 NOTE — PLAN OF CARE
Problem: Adult Inpatient Plan of Care  Goal: Plan of Care Review  12/16/2024 0557 by Yanni Jansen RN  Outcome: Progressing  12/16/2024 0557 by Yanni Jansen RN  Outcome: Not Progressing  Goal: Patient-Specific Goal (Individualized)  12/16/2024 0557 by Yanni Jansen RN  Outcome: Progressing  12/16/2024 0557 by Yanni Jansen RN  Outcome: Progressing  Goal: Absence of Hospital-Acquired Illness or Injury  12/16/2024 0557 by Yanni Jansen RN  Outcome: Progressing  12/16/2024 0557 by Yanni Jansen RN  Outcome: Not Progressing  Intervention: Identify and Manage Fall Risk  Description: Perform standard risk assessment on admission using a validated tool or comprehensive approach appropriate to the patient; reassess fall risk frequently, with change in status or transfer to another level of care.  Communicate risk to interprofessional healthcare team; ensure fall risk visible cue.  Determine need for increased observation, equipment and environmental modification, as well as use of supportive, nonskid footwear.  Adjust safety measures to individual needs and identified risk factors.  Reinforce the importance of active participation with fall risk prevention, safety, and physical activity with the patient and family.  Perform regular intentional rounding to assess need for position change, pain assessment and personal needs, including assistance with toileting.  Recent Flowsheet Documentation  Taken 12/16/2024 0400 by Yanni Jansen RN  Safety Promotion/Fall Prevention:   activity supervised   assistive device/personal items within reach   clutter free environment maintained   fall prevention program maintained   lighting adjusted   mobility aid in reach   nonskid shoes/slippers when out of bed   room organization consistent   safety round/check completed  Taken 12/16/2024 0207 by Yanni Jansen RN  Safety Promotion/Fall Prevention:   activity supervised   assistive device/personal items within reach    clutter free environment maintained   fall prevention program maintained   lighting adjusted   mobility aid in reach   nonskid shoes/slippers when out of bed   room organization consistent   safety round/check completed  Taken 12/16/2024 0000 by Yanni Jansen RN  Safety Promotion/Fall Prevention:   activity supervised   assistive device/personal items within reach   clutter free environment maintained   fall prevention program maintained   lighting adjusted   mobility aid in reach   nonskid shoes/slippers when out of bed   room organization consistent   safety round/check completed  Taken 12/15/2024 2200 by Yanni Jansen RN  Safety Promotion/Fall Prevention:   activity supervised   assistive device/personal items within reach   clutter free environment maintained   fall prevention program maintained   lighting adjusted   mobility aid in reach   nonskid shoes/slippers when out of bed   room organization consistent   safety round/check completed  Taken 12/15/2024 2000 by Yanni Jansen RN  Safety Promotion/Fall Prevention:   activity supervised   assistive device/personal items within reach   clutter free environment maintained   fall prevention program maintained   lighting adjusted   mobility aid in reach   nonskid shoes/slippers when out of bed   room organization consistent   safety round/check completed  Intervention: Prevent Skin Injury  Description: Perform a screening for skin injury risk, such as pressure or moisture-associated skin damage on admission and at regular intervals throughout hospital stay.  Keep all areas of skin (especially folds) clean and dry.  Maintain adequate skin hydration.  Relieve and redistribute pressure and protect bony prominences and skin at risk for injury; implement measures based on patient-specific risk factors.  Match turning and repositioning schedule to clinical condition.  Encourage weight shift frequently; assist with reposition if unable to complete  independently.  Float heels off bed; avoid pressure on the Achilles tendon.  Keep skin free from extended contact with medical devices.  Optimize nutrition and hydration.  Encourage functional activity and mobility, as early as tolerated.  Use aids (e.g., slide boards, mechanical lift) during transfer.  Recent Flowsheet Documentation  Taken 12/16/2024 0400 by Yanni Jansen RN  Body Position: position changed independently  Taken 12/16/2024 0207 by Yanni Jansen RN  Body Position: position changed independently  Taken 12/16/2024 0000 by Yanni Jansen RN  Body Position: position changed independently  Taken 12/15/2024 2200 by Yanni Jansen RN  Body Position: position changed independently  Taken 12/15/2024 2000 by Yanni Jansen RN  Body Position: position changed independently  Skin Protection:   transparent dressing maintained   skin sealant/moisture barrier applied   silicone foam dressing in place   incontinence pads utilized  Intervention: Prevent Infection  Description: Maintain skin and mucous membrane integrity; promote hand, oral and pulmonary hygiene.  Optimize fluid balance, nutrition, sleep and glycemic control to maximize infection resistance.  Identify potential sources of infection early to prevent or mitigate progression of infection (e.g., wound, lines, devices).  Evaluate ongoing need for invasive devices; remove promptly when no longer indicated.  Review vaccination status.  Recent Flowsheet Documentation  Taken 12/15/2024 2000 by Yanni Jansen RN  Infection Prevention:   equipment surfaces disinfected   hand hygiene promoted   personal protective equipment utilized   rest/sleep promoted  Goal: Optimal Comfort and Wellbeing  12/16/2024 0557 by Yanni Jansen RN  Outcome: Progressing  12/16/2024 0557 by Yanni Jansen RN  Outcome: Not Progressing  Intervention: Provide Person-Centered Care  Description: Use a family-focused approach to care; encourage support system presence and  participation.  Develop trust and rapport by proactively providing information, encouraging questions, addressing concerns and offering reassurance.  Acknowledge emotional response to hospitalization.  Recognize and utilize personal coping strategies and strengths; develop goals via shared decision-making.  Honor spiritual and cultural preferences.  Recent Flowsheet Documentation  Taken 12/15/2024 2000 by Yanni Jansen, RN  Trust Relationship/Rapport:   care explained   choices provided   emotional support provided   empathic listening provided   questions answered  Goal: Readiness for Transition of Care  12/16/2024 0557 by Yanni Jansen, RN  Outcome: Progressing  12/16/2024 0557 by Yanni Jansen, RN  Outcome: Not Progressing   Goal Outcome Evaluation:

## 2024-12-16 NOTE — CASE MANAGEMENT/SOCIAL WORK
Continued Stay Note  [facility]     Patient Name: Nolberto Jackson Jr.  MRN: [id number]  Today's Date: 12/16/2024    Admit Date: 12/15/2024    Plan: Home   Discharge Plan       Row Name 12/16/24 1221       Plan    Plan Home    Patient/Family in Agreement with Plan yes    Plan Comments Met with Mr. Jackson at the bedside to initiate discharge planning. Mr. Jackson lives alone in a [facility]. He is independent with activities of daily living and does not use any DME. He denies the receipt of home health or outpatient services. His primary care provider is Tammy Garland, and [facility] [place]. He denies obstacles to getting medical care when needed or obtaining medications. No discharge needs were identified today. He anticipates going home at discharge, and a friend or family will transport.  will continue to follow plan of care and assist with discharge planning needs as indicated.    Final Discharge Disposition Code 01 - home or self-care                   Discharge Codes    No documentation.                 Expected Discharge Date and Time       Expected Discharge Date Expected Discharge Time    Dec 17, 2024               Stephanie Chavarria RN

## 2024-12-17 ENCOUNTER — READMISSION MANAGEMENT (OUTPATIENT)
Dept: CALL CENTER | Facility: HOSPITAL | Age: 69
End: 2024-12-17
Payer: MEDICARE

## 2024-12-17 VITALS
HEIGHT: 68 IN | OXYGEN SATURATION: 97 % | HEART RATE: 87 BPM | TEMPERATURE: 98 F | WEIGHT: 178 LBS | RESPIRATION RATE: 18 BRPM | BODY MASS INDEX: 26.98 KG/M2 | DIASTOLIC BLOOD PRESSURE: 85 MMHG | SYSTOLIC BLOOD PRESSURE: 123 MMHG

## 2024-12-17 PROBLEM — R65.10 SIRS (SYSTEMIC INFLAMMATORY RESPONSE SYNDROME): Status: RESOLVED | Noted: 2024-12-15 | Resolved: 2024-12-17

## 2024-12-17 PROBLEM — N17.9 ACUTE KIDNEY INJURY SUPERIMPOSED ON CHRONIC KIDNEY DISEASE: Status: RESOLVED | Noted: 2024-12-15 | Resolved: 2024-12-17

## 2024-12-17 PROBLEM — K56.600 PARTIAL SMALL BOWEL OBSTRUCTION: Status: RESOLVED | Noted: 2024-12-15 | Resolved: 2024-12-17

## 2024-12-17 PROBLEM — K85.90 PANCREATITIS: Status: RESOLVED | Noted: 2024-12-15 | Resolved: 2024-12-17

## 2024-12-17 PROBLEM — N18.9 ACUTE KIDNEY INJURY SUPERIMPOSED ON CHRONIC KIDNEY DISEASE: Status: RESOLVED | Noted: 2024-12-15 | Resolved: 2024-12-17

## 2024-12-17 PROCEDURE — 99239 HOSP IP/OBS DSCHRG MGMT >30: CPT | Performed by: PEDIATRICS

## 2024-12-17 RX ORDER — CARVEDILOL 6.25 MG/1
6.25 TABLET ORAL 2 TIMES DAILY
Start: 2024-12-17

## 2024-12-17 RX ORDER — HYDROCORTISONE ACETATE 25 MG/1
25 SUPPOSITORY RECTAL 2 TIMES DAILY PRN
Qty: 12 EACH | Refills: 0 | Status: SHIPPED | OUTPATIENT
Start: 2024-12-17

## 2024-12-17 RX ADMIN — CARVEDILOL 6.25 MG: 6.25 TABLET, FILM COATED ORAL at 09:21

## 2024-12-17 RX ADMIN — PRAVASTATIN SODIUM 40 MG: 40 TABLET ORAL at 09:21

## 2024-12-17 RX ADMIN — AVOBENZONE, HOMOSALATE, OCTISALATE, OCTOCRYLENE, AND OXYBENZONE 1 PACKET: 29.4; 147; 49; 25.4; 58.8 LOTION TOPICAL at 09:22

## 2024-12-17 RX ADMIN — PANTOPRAZOLE SODIUM 40 MG: 40 INJECTION, POWDER, LYOPHILIZED, FOR SOLUTION INTRAVENOUS at 05:56

## 2024-12-17 RX ADMIN — Medication 10 ML: at 09:23

## 2024-12-17 RX ADMIN — POLYETHYLENE GLYCOL 3350 17 G: 17 POWDER, FOR SOLUTION ORAL at 09:21

## 2024-12-17 NOTE — DISCHARGE SUMMARY
[facility] Medicine Services  DISCHARGE SUMMARY    Patient Name: Nolberto Jackson Jr.  : 1955  MRN: [id number]    Date of Admission: 12/15/2024 12:17 AM  Date of Discharge:  2024  Primary Care Physician: Tammy Myers MD    Consults       Date and Time Order Name Status Description    12/15/2024  9:14 AM Inpatient Gastroenterology Consult Completed             Hospital Course     Presenting Problem: abd pain    Active Hospital Problems    Diagnosis  POA    CKD (chronic kidney disease) stage 4, GFR 15-29 ml/min [N18.4]  Yes    History of colon cancer [Z85.038]  Yes    History of kidney cancer [Z85.528]  Not Applicable    Hyperlipidemia [E78.5]  Yes    HTN (hypertension) [I10]  Yes      Resolved Hospital Problems    Diagnosis Date Resolved POA    **Partial small bowel obstruction [K56.600] 2024 Yes    Acute kidney injury superimposed on chronic kidney disease [N17.9, N18.9] 2024 Yes    Pancreatitis [K85.90] 2024 Yes    SIRS (systemic inflammatory response syndrome) [R65.10] 2024 Yes          Hospital Course:  Nolberto Jackson Jr. is a 69 y.o. male with history of colon cancer s/p right hemicolectomy (2005), left ureteral cancer s/p left nephrectomy (), CKD IV, HLD, HTN, who presented with abdominal pain and watery diarrhea x 4-6 weeks.       Leukocytosis  Diarrhea  Pancreatitis  Partial SBO  - denies recent antibiotic use  - Neg C diff and GI panel PCRs  -Appreciate GI input  -Sx resolved.  Tolerating diet.     TAM on CKD IV  - h/o left nephrectomy  - Improved Cr.  Seems to be closer to baseline.  - IV fluid hydration with improvement in Cr     HTN  - restarted coreg     HLD  - statin      Discharge Follow Up Recommendations for outpatient labs/diagnostics:       Day of Discharge     HPI:   Pt doing well.  Tolerated full liquid diet this AM. No pain.        Vital Signs:   Temp:  [97.7 °F (36.5 °C)-98.7 °F (37.1 °C)] 98 °F (36.7 °C)  Heart  Rate:  [83-97] 87  Resp:  [18] 18  BP: (109-140)/(73-85) 123/85      Physical Exam:  Non toxic, in bed, very talkative and interactive  MM moist  RRR  Breath sounds clear bilaterally  Abdomen soft and non tender  Alert, speech slightly dysarthric, but this is baseline per family  Normal affect    Pertinent  and/or Most Recent Results     LAB RESULTS:      Lab 12/16/24  0421 12/15/24  1136 12/15/24  0145 12/15/24  0035   WBC 8.30 13.01*  --  30.99*   HEMOGLOBIN 10.1* 10.4*  --  13.8   HEMATOCRIT 33.1* 33.3*  --  42.3   PLATELETS 109* 130*  --  274   NEUTROS ABS 7.13* 11.74*  --  26.32*   IMMATURE GRANS (ABS) 0.04 0.10*  --  0.31*   LYMPHS ABS 0.36* 0.31*  --  1.84   MONOS ABS 0.40 0.53  --  1.40*   EOS ABS 0.33 0.28  --  1.00*   MCV 94.6 92.5  --  89.6   SED RATE  --   --   --  68*   CRP  --   --  3.34*  --    LACTATE  --   --   --  1.0         Lab 12/16/24  0421 12/15/24  0601 12/15/24  0145   SODIUM 142 140 138   POTASSIUM 4.4 4.3 4.7   CHLORIDE 120* 115* 111*   CO2 10.0* 12.0* 11.0*   ANION GAP 12.0 13.0 16.0*   BUN 59* 75* 83*   CREATININE 3.62* 3.95* 4.15*   EGFR 17.6* 15.9* 15.0*   GLUCOSE 105* 115* 115*   CALCIUM 9.0 8.4* 9.7   MAGNESIUM  --  2.0  --          Lab 12/15/24  0601 12/15/24  0145   TOTAL PROTEIN 6.1 7.5   ALBUMIN 3.5 3.8   GLOBULIN 2.6 3.7   ALT (SGPT) 13 15   AST (SGOT) 14 17   BILIRUBIN 0.3 0.2   ALK PHOS 119* 143*   LIPASE 857* 557*             Lab 12/15/24  0601   CHOLESTEROL 102   LDL CHOL 57   HDL CHOL 21*   TRIGLYCERIDES 130             Brief Urine Lab Results  (Last result in the past 365 days)        Color   Clarity   Blood   Leuk Est   Nitrite   Protein   CREAT   Urine HCG        12/15/24 0036 Yellow   Clear   Large (3+)   Trace   Negative   100 mg/dL (2+)                 Microbiology Results (last 10 days)       Procedure Component Value - Date/Time    Gastrointestinal Panel, PCR - Stool, Per Rectum [[id number]]  (Normal) Collected: 12/15/24 1018    Lab Status: Final result Specimen:  Stool from Per Rectum Updated: 12/15/24 1144     Campylobacter Not Detected     Plesiomonas shigelloides Not Detected     Salmonella Not Detected     Vibrio Not Detected     Vibrio cholerae Not Detected     Yersinia enterocolitica Not Detected     Enteroaggregative E. coli (EAEC) Not Detected     Enteropathogenic E. coli (EPEC) Not Detected     Enterotoxigenic E. coli (ETEC) lt/st Not Detected     Shiga-like toxin-producing E. coli (STEC) stx1/stx2 Not Detected     Shigella/Enteroinvasive E. coli (EIEC) Not Detected     Cryptosporidium Not Detected     Cyclospora cayetanensis Not Detected     Entamoeba histolytica Not Detected     Giardia lamblia Not Detected     Adenovirus F40/41 Not Detected     Astrovirus Not Detected     Norovirus GI/GII Not Detected     Rotavirus A Not Detected     Sapovirus (I, II, IV or V) Not Detected    Clostridioides difficile Toxin - Stool, Per Rectum [[id number]]  (Normal) Collected: 12/15/24 1018    Lab Status: Final result Specimen: Stool from Per Rectum Updated: 12/15/24 1114    Narrative:      The following orders were created for panel order Clostridioides difficile Toxin - Stool, Per Rectum.  Procedure                               Abnormality         Status                     ---------                               -----------         ------                     Clostridioides difficile...[[id number]]  Normal              Final result                 Please view results for these tests on the individual orders.    Clostridioides difficile Toxin, PCR - Stool, Per Rectum [[id number]]  (Normal) Collected: 12/15/24 1018    Lab Status: Final result Specimen: Stool from Per Rectum Updated: 12/15/24 1114     Toxigenic C. difficile by PCR Not Detected    Narrative:      The result indicates the absence of toxigenic C. difficile from stool specimen.     Blood Culture - Blood, Arm, Right [[id number]]  (Normal) Collected: 12/15/24 0145    Lab Status: Preliminary result Specimen: Blood from Arm,  Right Updated: 12/17/24 0745     Blood Culture No growth at 2 days    Narrative:      Less than seven (7) mL's of blood was collected.  Insufficient quantity may yield false negative results.    Blood Culture - Blood, Arm, Right [[id number]]  (Normal) Collected: 12/15/24 0111    Lab Status: Preliminary result Specimen: Blood from Arm, Right Updated: 12/17/24 0745     Blood Culture No growth at 2 days    Narrative:      Less than seven (7) mL's of blood was collected.  Insufficient quantity may yield false negative results.            XR Chest 1 View    Result Date: 12/15/2024  Examination: XR CHEST 1 VW-  Date of Exam: 12/15/2024 5:02 AM  Indication: sepsis- unlcear etiology; A41.9-Sepsis, unspecified organism; R65.20-Severe sepsis without septic shock; N17.9-Acute kidney failure, unspecified; R10.31-Right lower quadrant pain; R19.7-Diarrhea, unspecified; R11.0-Nausea; R74.8-Abnormal levels of other serum enzymes; N17.9-Acute kidney failure, unspecified; N18.30-Chronic kidney disease, stage 3 unspecified; Z85.038-Personal history of other mal.  Comparison: Chest radiograph dated January 18, 2024  Technique: 1 view of the chest  Findings: There are no airspace consolidations. No pleural effusions. No pneumothorax. The heart size is normal. The pulmonary vasculature appears within normal limits. No acute osseous abnormality identified.      No acute cardiopulmonary process.  This report was finalized on 12/15/2024 5:33 AM by Michael Castillo MD.      CT Abdomen Pelvis Without Contrast    Result Date: 12/15/2024  CT ABDOMEN PELVIS WO CONTRAST Date of Exam: 12/15/2024 1:20 AM EST Indication: right renal colic, nausea, bowel changes, recent colitis, h/o CKD Stage 3. Comparison: None available. Technique: Axial CT images were obtained of the abdomen and pelvis without the administration of contrast. Reconstructed coronal and sagittal images were also obtained. Automated exposure control and iterative construction methods were  used. Findings: Lung Bases:   There is scarring in the lateral aspect of the right lower lobe. Liver: Liver is normal in size and CT density. No focal lesions. Biliary/Gallbladder:  The gallbladder is normal without evidence of radiopaque stones. The biliary tree is nondilated. Spleen: Spleen is normal in size and CT density. Pancreas:  Pancreas is normal. There is no evidence of pancreatic mass or peripancreatic fluid. Kidneys:  There are postoperative changes from left nephrectomy. There are areas of scarring of the right kidney. There is no right-sided renal mass or hydronephrosis. Adrenals:  Adrenal glands are unremarkable. Retroperitoneal/Lymph Nodes/Vasculature:  No retroperitoneal adenopathy is identified. Gastrointestinal/Mesentery:  There are postoperative changes from subtotal colectomy. There are few mildly dilated loops of the residual colon anastomosis in the small bowel adjacent. The more proximal small bowel is also fluid-filled and mildly dilated but there is no complete obstruction. There is no evidence of inflammatory change of the large or small bowel. Bladder:  The bladder is normal. Genital:   There is enlargement of the prostate gland.       Bony Structures:   Visualized bony structures are consistent with the patient's age.     Impression: Postoperative changes from subtotal colectomy. There are a few mildly dilated loops of small bowel and colon but there is no complete obstruction. There is no inflammatory change of the bowel. Electronically Signed: Michael Castillo MD  12/15/2024 1:43 AM EST  Workstation ID: [id number]                 Plan for Follow-up of Pending Labs/Results: We will call with abnormal results.    Pending Labs       Order Current Status    Blood Culture - Blood, Arm, Right Preliminary result    Blood Culture - Blood, Arm, Right Preliminary result          Discharge Details        Discharge Medications        New Medications        Instructions Start Date   hydrocortisone 25 MG  suppository  Commonly known as: ANUSOL-HC   25 mg, Rectal, 2 Times Daily PRN             Changes to Medications        Instructions Start Date   carvedilol 6.25 MG tablet  Commonly known as: COREG  What changed:   medication strength  how much to take   6.25 mg, Oral, 2 Times Daily             Continue These Medications        Instructions Start Date   allopurinol 100 MG tablet  Commonly known as: ZYLOPRIM   1 tablet, Oral, Daily      amLODIPine 10 MG tablet  Commonly known as: NORVASC   1 tablet, Oral, Daily      ondansetron 4 MG tablet  Commonly known as: ZOFRAN   Take 1 tablet by mouth Every 8 (Eight) Hours As Needed for post-op nausea.      oxyCODONE 5 MG immediate release tablet  Commonly known as: ROXICODONE   5 mg, Oral, Every 4 Hours PRN      pravastatin 40 MG tablet  Commonly known as: PRAVACHOL   1 tablet, Oral, Daily             Stop These Medications      ropivacaine 0.2 % infusion (INFUSYSTEM)  Commonly known as: NAROPIN              Allergies   Allergen Reactions    Shellfish Allergy Unknown - Low Severity    Diphenhydramine Hcl Delirium    Midazolam Other (See Comments)     Other reaction(s): N+V - Nausea and vomiting, N+V - Nausea and vomiting    Zolpidem Delirium     Other reaction(s): nightmares, nightmares         Discharge Disposition:  Home or Self Care    Diet:  Hospital:  Diet Order   Procedures    Diet: Gastrointestinal; Fiber-Restricted; Texture: Soft to Chew (NDD 3); Soft to Chew: Whole Meat; Fluid Consistency: Thin (IDDSI 0)       Diet Instructions       Diet: Gastrointestinal Diets; Low Irritant; Regular (IDDSI 7); Thin (IDDSI 0)      Discharge Diet: Gastrointestinal Diets    Gastrointestinal Diet: Low Irritant    Texture: Regular (IDDSI 7)    Fluid Consistency: Thin (IDDSI 0)             Activity:      Restrictions or Other Recommendations:         CODE STATUS:    Code Status and Medical Interventions: CPR (Attempt to Resuscitate); Full Support   Ordered at: 12/15/24 7390     Code Status  (Patient has no pulse and is not breathing):    CPR (Attempt to Resuscitate)     Medical Interventions (Patient has pulse or is breathing):    Full Support       Future Appointments   Date Time Provider Department Center   2/26/2025  9:00 AM [facility] [place]       Additional Instructions for the Follow-ups that You Need to Schedule       Discharge Follow-up with PCP   As directed       Currently Documented PCP:    Tammy Myers MD    PCP Phone Number:    [phone]     Follow Up Details: Call for appointment in 7-10 days for hospital follow up appointment        Discharge Follow-up with Specialty: Dr. Olivia   As directed      Specialty: Dr. Olivia   Follow Up Details: Keep appointment for later this week                      Arianna Dodd MD  12/17/24      Time Spent on Discharge:  I spent  32  minutes on this discharge activity which included: face-to-face encounter with the patient, reviewing the data in the system, coordination of the care with the nursing staff as well as consultants, documentation, and entering orders.

## 2024-12-18 NOTE — OUTREACH NOTE
Prep Survey      Flowsheet Row Responses   [facility] facility patient discharged from? [place]   Is LACE score < 7 ? No   Eligibility Readm Mgmt   Discharge diagnosis Partial small bowel obstruction   Does the patient have one of the following disease processes/diagnoses(primary or secondary)? Other   Does the patient have Home health ordered? No   Is there a DME ordered? No   Prep survey completed? Yes            EDDIE LEIJA - Registered Nurse

## 2024-12-20 ENCOUNTER — READMISSION MANAGEMENT (OUTPATIENT)
Dept: CALL CENTER | Facility: HOSPITAL | Age: 69
End: 2024-12-20
Payer: MEDICARE

## 2024-12-20 LAB
BACTERIA SPEC AEROBE CULT: NORMAL
BACTERIA SPEC AEROBE CULT: NORMAL

## 2024-12-20 NOTE — OUTREACH NOTE
Medical Week 1 Survey      Flowsheet Row Responses   [facility], [place], operated by [facility] patient discharged from? [place]   Does the patient have one of the following disease processes/diagnoses(primary or secondary)? Other   Week 1 attempt successful? Yes   Call start time 0820   Call end time 0826   Discharge diagnosis Partial small bowel obstruction   Meds reviewed with patient/caregiver? Yes   Is the patient having any side effects they believe may be caused by any medication additions or changes? No   Does the patient have all medications ordered at discharge? Yes   Is the patient taking all medications as directed (includes completed medication regime)? Yes   Does the patient have a primary care provider?  Yes   Does the patient have an appointment with their PCP within 7 days of discharge? Yes   Comments regarding PCP PCP follow up net week   Has the patient kept scheduled appointments due by today? N/A   Has home health visited the patient within 72 hours of discharge? N/A   Psychosocial issues? No   Did the patient receive a copy of their discharge instructions? Yes   Nursing interventions Reviewed instructions with patient   What is the patient's perception of their health status since discharge? Improving   Is the patient/caregiver able to teach back signs and symptoms related to disease process for when to call PCP? Yes   Is the patient/caregiver able to teach back signs and symptoms related to disease process for when to call 911? Yes   Is the patient/caregiver able to teach back the hierarchy of who to call/visit for symptoms/problems? PCP, Specialist, Home health nurse, Urgent Care, ED, 911 Yes   If the patient is a current smoker, are they able to teach back resources for cessation? Not a smoker   Week 1 call completed? Yes   Would this patient benefit from a Referral to Amb Social Work? No   Is the patient interested in additional calls from an ambulatory ? No   Wrap up additional comments Patient  reports abdominal pain has improved, poor appetite.   Call end time [id number]            Melissa T - Registered Nurse

## 2025-01-09 ENCOUNTER — APPOINTMENT (OUTPATIENT)
Dept: CT IMAGING | Facility: HOSPITAL | Age: 70
End: 2025-01-09
Payer: MEDICARE

## 2025-01-09 ENCOUNTER — HOSPITAL ENCOUNTER (OUTPATIENT)
Facility: HOSPITAL | Age: 70
Setting detail: OBSERVATION
Discharge: HOME | End: 2025-01-11
Attending: EMERGENCY MEDICINE | Admitting: INTERNAL MEDICINE
Payer: MEDICARE

## 2025-01-09 DIAGNOSIS — R19.00 PELVIC MASS IN MALE: Primary | ICD-10-CM

## 2025-01-09 DIAGNOSIS — N32.89 BLADDER MASS: ICD-10-CM

## 2025-01-09 DIAGNOSIS — A41.9 SEPSIS, DUE TO UNSPECIFIED ORGANISM, UNSPECIFIED WHETHER ACUTE ORGAN DYSFUNCTION PRESENT: ICD-10-CM

## 2025-01-09 DIAGNOSIS — N18.4 STAGE 4 CHRONIC KIDNEY DISEASE: ICD-10-CM

## 2025-01-09 PROBLEM — D64.9 ANEMIA: Status: ACTIVE | Noted: 2025-01-09

## 2025-01-09 PROBLEM — D72.829 LEUKOCYTOSIS: Status: ACTIVE | Noted: 2025-01-09

## 2025-01-09 LAB
ALBUMIN SERPL-MCNC: 3.6 G/DL (ref 3.5–5.2)
ALBUMIN/GLOB SERPL: 1.2 G/DL
ALP SERPL-CCNC: 151 U/L (ref 39–117)
ALT SERPL W P-5'-P-CCNC: 20 U/L (ref 1–41)
ANION GAP SERPL CALCULATED.3IONS-SCNC: 15 MMOL/L (ref 5–15)
APTT PPP: 33.1 SECONDS (ref 60–90)
AST SERPL-CCNC: 19 U/L (ref 1–40)
BACTERIA UR QL AUTO: ABNORMAL /HPF
BASOPHILS # BLD AUTO: 0.04 10*3/MM3 (ref 0–0.2)
BASOPHILS NFR BLD AUTO: 0.2 % (ref 0–1.5)
BILIRUB SERPL-MCNC: 0.7 MG/DL (ref 0–1.2)
BILIRUB UR QL STRIP: NEGATIVE
BUN SERPL-MCNC: 45 MG/DL (ref 8–23)
BUN/CREAT SERPL: 13.2 (ref 7–25)
CALCIUM SPEC-SCNC: 10.2 MG/DL (ref 8.6–10.5)
CHLORIDE SERPL-SCNC: 100 MMOL/L (ref 98–107)
CLARITY UR: ABNORMAL
CO2 SERPL-SCNC: 20 MMOL/L (ref 22–29)
COLOR UR: YELLOW
CREAT BLDA-MCNC: 3.9 MG/DL (ref 0.6–1.3)
CREAT SERPL-MCNC: 3.42 MG/DL (ref 0.76–1.27)
D-LACTATE SERPL-SCNC: 1.9 MMOL/L (ref 0.5–2)
DEPRECATED RDW RBC AUTO: 43.7 FL (ref 37–54)
EGFRCR SERPLBLD CKD-EPI 2021: 18.9 ML/MIN/1.73
EOSINOPHIL # BLD AUTO: 0.36 10*3/MM3 (ref 0–0.4)
EOSINOPHIL NFR BLD AUTO: 1.9 % (ref 0.3–6.2)
ERYTHROCYTE [DISTWIDTH] IN BLOOD BY AUTOMATED COUNT: 13.2 % (ref 12.3–15.4)
FERRITIN SERPL-MCNC: 620.9 NG/ML (ref 30–400)
GLOBULIN UR ELPH-MCNC: 2.9 GM/DL
GLUCOSE SERPL-MCNC: 102 MG/DL (ref 65–99)
GLUCOSE UR STRIP-MCNC: NEGATIVE MG/DL
GRAN CASTS URNS QL MICRO: ABNORMAL /LPF
HCT VFR BLD AUTO: 29.5 % (ref 37.5–51)
HGB BLD-MCNC: 9.2 G/DL (ref 13–17.7)
HGB UR QL STRIP.AUTO: ABNORMAL
HOLD SPECIMEN: NORMAL
HYALINE CASTS UR QL AUTO: ABNORMAL /LPF
IMM GRANULOCYTES # BLD AUTO: 0.36 10*3/MM3 (ref 0–0.05)
IMM GRANULOCYTES NFR BLD AUTO: 1.9 % (ref 0–0.5)
INR PPP: 1.31 (ref 0.89–1.12)
IRON 24H UR-MRATE: 14 MCG/DL (ref 59–158)
IRON SATN MFR SERPL: 10 % (ref 20–50)
KETONES UR QL STRIP: ABNORMAL
LEUKOCYTE ESTERASE UR QL STRIP.AUTO: ABNORMAL
LIPASE SERPL-CCNC: 34 U/L (ref 13–60)
LYMPHOCYTES # BLD AUTO: 0.7 10*3/MM3 (ref 0.7–3.1)
LYMPHOCYTES NFR BLD AUTO: 3.7 % (ref 19.6–45.3)
MCH RBC QN AUTO: 28.5 PG (ref 26.6–33)
MCHC RBC AUTO-ENTMCNC: 31.2 G/DL (ref 31.5–35.7)
MCV RBC AUTO: 91.3 FL (ref 79–97)
MONOCYTES # BLD AUTO: 1.08 10*3/MM3 (ref 0.1–0.9)
MONOCYTES NFR BLD AUTO: 5.7 % (ref 5–12)
NEUTROPHILS NFR BLD AUTO: 16.55 10*3/MM3 (ref 1.7–7)
NEUTROPHILS NFR BLD AUTO: 86.6 % (ref 42.7–76)
NITRITE UR QL STRIP: NEGATIVE
NRBC BLD AUTO-RTO: 0 /100 WBC (ref 0–0.2)
NT-PROBNP SERPL-MCNC: 1017 PG/ML (ref 0–900)
PH UR STRIP.AUTO: 5.5 [PH] (ref 5–8)
PLATELET # BLD AUTO: 151 10*3/MM3 (ref 140–450)
PMV BLD AUTO: 10.3 FL (ref 6–12)
POTASSIUM SERPL-SCNC: 4.3 MMOL/L (ref 3.5–5.2)
PROCALCITONIN SERPL-MCNC: 0.41 NG/ML (ref 0–0.25)
PROT SERPL-MCNC: 6.5 G/DL (ref 6–8.5)
PROT UR QL STRIP: ABNORMAL
PROTHROMBIN TIME: 16.4 SECONDS (ref 12.2–14.5)
QT INTERVAL: 326 MS
QTC INTERVAL: 407 MS
RBC # BLD AUTO: 3.23 10*6/MM3 (ref 4.14–5.8)
RBC # UR STRIP: ABNORMAL /HPF
REF LAB TEST METHOD: ABNORMAL
SODIUM SERPL-SCNC: 135 MMOL/L (ref 136–145)
SP GR UR STRIP: 1.02 (ref 1–1.03)
SQUAMOUS #/AREA URNS HPF: ABNORMAL /HPF
TIBC SERPL-MCNC: 146 MCG/DL (ref 298–536)
TRANS CELLS #/AREA URNS HPF: ABNORMAL /HPF
TRANSFERRIN SERPL-MCNC: 98 MG/DL (ref 200–360)
TROPONIN T SERPL HS-MCNC: 26 NG/L
UROBILINOGEN UR QL STRIP: ABNORMAL
WBC # UR STRIP: ABNORMAL /HPF
WBC CASTS #/AREA URNS LPF: ABNORMAL /LPF
WBC NRBC COR # BLD AUTO: 19.09 10*3/MM3 (ref 3.4–10.8)
WHOLE BLOOD HOLD COAG: NORMAL
WHOLE BLOOD HOLD SPECIMEN: NORMAL

## 2025-01-09 PROCEDURE — 83690 ASSAY OF LIPASE: CPT | Performed by: EMERGENCY MEDICINE

## 2025-01-09 PROCEDURE — 82728 ASSAY OF FERRITIN: CPT | Performed by: NURSE PRACTITIONER

## 2025-01-09 PROCEDURE — 96365 THER/PROPH/DIAG IV INF INIT: CPT

## 2025-01-09 PROCEDURE — 96375 TX/PRO/DX INJ NEW DRUG ADDON: CPT

## 2025-01-09 PROCEDURE — 25810000003 SEPSIS FLUID NS 0.9 % SOLUTION

## 2025-01-09 PROCEDURE — 85025 COMPLETE CBC W/AUTO DIFF WBC: CPT | Performed by: EMERGENCY MEDICINE

## 2025-01-09 PROCEDURE — G0378 HOSPITAL OBSERVATION PER HR: HCPCS

## 2025-01-09 PROCEDURE — 83540 ASSAY OF IRON: CPT | Performed by: NURSE PRACTITIONER

## 2025-01-09 PROCEDURE — 84466 ASSAY OF TRANSFERRIN: CPT | Performed by: NURSE PRACTITIONER

## 2025-01-09 PROCEDURE — 74177 CT ABD & PELVIS W/CONTRAST: CPT

## 2025-01-09 PROCEDURE — 25510000001 IOPAMIDOL 61 % SOLUTION: Performed by: EMERGENCY MEDICINE

## 2025-01-09 PROCEDURE — 85610 PROTHROMBIN TIME: CPT

## 2025-01-09 PROCEDURE — 81001 URINALYSIS AUTO W/SCOPE: CPT | Performed by: EMERGENCY MEDICINE

## 2025-01-09 PROCEDURE — 83605 ASSAY OF LACTIC ACID: CPT | Performed by: EMERGENCY MEDICINE

## 2025-01-09 PROCEDURE — 93005 ELECTROCARDIOGRAM TRACING: CPT

## 2025-01-09 PROCEDURE — 99285 EMERGENCY DEPT VISIT HI MDM: CPT

## 2025-01-09 PROCEDURE — 87040 BLOOD CULTURE FOR BACTERIA: CPT

## 2025-01-09 PROCEDURE — 87086 URINE CULTURE/COLONY COUNT: CPT | Performed by: INTERNAL MEDICINE

## 2025-01-09 PROCEDURE — 25010000002 MORPHINE PER 10 MG: Performed by: EMERGENCY MEDICINE

## 2025-01-09 PROCEDURE — 25010000002 PIPERACILLIN SOD-TAZOBACTAM PER 1 G

## 2025-01-09 PROCEDURE — 83880 ASSAY OF NATRIURETIC PEPTIDE: CPT

## 2025-01-09 PROCEDURE — 84484 ASSAY OF TROPONIN QUANT: CPT

## 2025-01-09 PROCEDURE — 25010000002 ONDANSETRON PER 1 MG: Performed by: EMERGENCY MEDICINE

## 2025-01-09 PROCEDURE — 85730 THROMBOPLASTIN TIME PARTIAL: CPT

## 2025-01-09 PROCEDURE — 96376 TX/PRO/DX INJ SAME DRUG ADON: CPT

## 2025-01-09 PROCEDURE — 71260 CT THORAX DX C+: CPT

## 2025-01-09 PROCEDURE — 84145 PROCALCITONIN (PCT): CPT

## 2025-01-09 PROCEDURE — 36415 COLL VENOUS BLD VENIPUNCTURE: CPT

## 2025-01-09 PROCEDURE — 82565 ASSAY OF CREATININE: CPT

## 2025-01-09 PROCEDURE — 80053 COMPREHEN METABOLIC PANEL: CPT | Performed by: EMERGENCY MEDICINE

## 2025-01-09 PROCEDURE — 25010000002 CEFTRIAXONE PER 250 MG

## 2025-01-09 RX ORDER — SODIUM CHLORIDE 9 MG/ML
75 INJECTION, SOLUTION INTRAVENOUS CONTINUOUS
Status: ACTIVE | OUTPATIENT
Start: 2025-01-10 | End: 2025-01-10

## 2025-01-09 RX ORDER — MORPHINE SULFATE 4 MG/ML
4 INJECTION, SOLUTION INTRAMUSCULAR; INTRAVENOUS ONCE
Status: COMPLETED | OUTPATIENT
Start: 2025-01-09 | End: 2025-01-09

## 2025-01-09 RX ORDER — BISACODYL 5 MG/1
5 TABLET, DELAYED RELEASE ORAL DAILY PRN
Status: DISCONTINUED | OUTPATIENT
Start: 2025-01-09 | End: 2025-01-11 | Stop reason: HOSPADM

## 2025-01-09 RX ORDER — HYDROMORPHONE HYDROCHLORIDE 1 MG/ML
0.5 INJECTION, SOLUTION INTRAMUSCULAR; INTRAVENOUS; SUBCUTANEOUS
Status: DISCONTINUED | OUTPATIENT
Start: 2025-01-09 | End: 2025-01-11 | Stop reason: HOSPADM

## 2025-01-09 RX ORDER — AMLODIPINE BESYLATE 10 MG/1
10 TABLET ORAL DAILY
Status: DISCONTINUED | OUTPATIENT
Start: 2025-01-09 | End: 2025-01-11 | Stop reason: HOSPADM

## 2025-01-09 RX ORDER — SODIUM CHLORIDE 9 MG/ML
40 INJECTION, SOLUTION INTRAVENOUS AS NEEDED
Status: DISCONTINUED | OUTPATIENT
Start: 2025-01-09 | End: 2025-01-11 | Stop reason: HOSPADM

## 2025-01-09 RX ORDER — SODIUM CHLORIDE 0.9 % (FLUSH) 0.9 %
10 SYRINGE (ML) INJECTION AS NEEDED
Status: DISCONTINUED | OUTPATIENT
Start: 2025-01-09 | End: 2025-01-11 | Stop reason: HOSPADM

## 2025-01-09 RX ORDER — ALUMINA, MAGNESIA, AND SIMETHICONE 2400; 2400; 240 MG/30ML; MG/30ML; MG/30ML
15 SUSPENSION ORAL EVERY 6 HOURS PRN
Status: DISCONTINUED | OUTPATIENT
Start: 2025-01-09 | End: 2025-01-11 | Stop reason: HOSPADM

## 2025-01-09 RX ORDER — AMOXICILLIN 250 MG
2 CAPSULE ORAL 2 TIMES DAILY
Status: DISCONTINUED | OUTPATIENT
Start: 2025-01-09 | End: 2025-01-11 | Stop reason: HOSPADM

## 2025-01-09 RX ORDER — ALLOPURINOL 100 MG/1
100 TABLET ORAL DAILY
Status: DISCONTINUED | OUTPATIENT
Start: 2025-01-09 | End: 2025-01-11 | Stop reason: HOSPADM

## 2025-01-09 RX ORDER — ACETAMINOPHEN 500 MG
1000 TABLET ORAL 3 TIMES DAILY
Status: DISCONTINUED | OUTPATIENT
Start: 2025-01-09 | End: 2025-01-11 | Stop reason: HOSPADM

## 2025-01-09 RX ORDER — SODIUM CHLORIDE 0.9 % (FLUSH) 0.9 %
10 SYRINGE (ML) INJECTION EVERY 12 HOURS SCHEDULED
Status: DISCONTINUED | OUTPATIENT
Start: 2025-01-09 | End: 2025-01-11 | Stop reason: HOSPADM

## 2025-01-09 RX ORDER — POLYETHYLENE GLYCOL 3350 17 G/17G
17 POWDER, FOR SOLUTION ORAL DAILY PRN
Status: DISCONTINUED | OUTPATIENT
Start: 2025-01-09 | End: 2025-01-11 | Stop reason: HOSPADM

## 2025-01-09 RX ORDER — BISACODYL 10 MG
10 SUPPOSITORY, RECTAL RECTAL DAILY PRN
Status: DISCONTINUED | OUTPATIENT
Start: 2025-01-09 | End: 2025-01-11 | Stop reason: HOSPADM

## 2025-01-09 RX ORDER — IOPAMIDOL 612 MG/ML
80 INJECTION, SOLUTION INTRAVASCULAR
Status: COMPLETED | OUTPATIENT
Start: 2025-01-09 | End: 2025-01-09

## 2025-01-09 RX ORDER — CARVEDILOL 6.25 MG/1
6.25 TABLET ORAL 2 TIMES DAILY
Status: DISCONTINUED | OUTPATIENT
Start: 2025-01-09 | End: 2025-01-11 | Stop reason: HOSPADM

## 2025-01-09 RX ORDER — ONDANSETRON 2 MG/ML
4 INJECTION INTRAMUSCULAR; INTRAVENOUS ONCE
Status: COMPLETED | OUTPATIENT
Start: 2025-01-09 | End: 2025-01-09

## 2025-01-09 RX ORDER — SODIUM CHLORIDE 9 MG/ML
10 INJECTION, SOLUTION INTRAMUSCULAR; INTRAVENOUS; SUBCUTANEOUS AS NEEDED
Status: DISCONTINUED | OUTPATIENT
Start: 2025-01-09 | End: 2025-01-11 | Stop reason: HOSPADM

## 2025-01-09 RX ORDER — NALOXONE HCL 0.4 MG/ML
0.4 VIAL (ML) INJECTION
Status: DISCONTINUED | OUTPATIENT
Start: 2025-01-09 | End: 2025-01-11 | Stop reason: HOSPADM

## 2025-01-09 RX ORDER — ONDANSETRON 2 MG/ML
4 INJECTION INTRAMUSCULAR; INTRAVENOUS EVERY 6 HOURS PRN
Status: DISCONTINUED | OUTPATIENT
Start: 2025-01-09 | End: 2025-01-11 | Stop reason: HOSPADM

## 2025-01-09 RX ORDER — HYDROCORTISONE ACETATE 25 MG/1
25 SUPPOSITORY RECTAL 2 TIMES DAILY PRN
Status: DISCONTINUED | OUTPATIENT
Start: 2025-01-09 | End: 2025-01-11 | Stop reason: HOSPADM

## 2025-01-09 RX ORDER — OXYCODONE HYDROCHLORIDE 5 MG/1
5 TABLET ORAL EVERY 4 HOURS PRN
Status: DISCONTINUED | OUTPATIENT
Start: 2025-01-09 | End: 2025-01-11 | Stop reason: HOSPADM

## 2025-01-09 RX ORDER — PRAVASTATIN SODIUM 40 MG
40 TABLET ORAL NIGHTLY
Status: DISCONTINUED | OUTPATIENT
Start: 2025-01-09 | End: 2025-01-11 | Stop reason: HOSPADM

## 2025-01-09 RX ADMIN — SENNOSIDES AND DOCUSATE SODIUM 2 TABLET: 50; 8.6 TABLET ORAL at 20:40

## 2025-01-09 RX ADMIN — SODIUM CHLORIDE 2382 ML: 9 INJECTION, SOLUTION INTRAVENOUS at 14:09

## 2025-01-09 RX ADMIN — MORPHINE SULFATE 4 MG: 4 INJECTION, SOLUTION INTRAMUSCULAR; INTRAVENOUS at 14:10

## 2025-01-09 RX ADMIN — SODIUM CHLORIDE 1000 MG: 900 INJECTION INTRAVENOUS at 15:39

## 2025-01-09 RX ADMIN — IOPAMIDOL 80 ML: 612 INJECTION, SOLUTION INTRAVENOUS at 14:49

## 2025-01-09 RX ADMIN — PIPERACILLIN AND TAZOBACTAM 3.38 G: 3; .375 INJECTION, POWDER, LYOPHILIZED, FOR SOLUTION INTRAVENOUS at 16:40

## 2025-01-09 RX ADMIN — MORPHINE SULFATE 4 MG: 4 INJECTION, SOLUTION INTRAMUSCULAR; INTRAVENOUS at 16:40

## 2025-01-09 RX ADMIN — PRAVASTATIN SODIUM 40 MG: 40 TABLET ORAL at 20:39

## 2025-01-09 RX ADMIN — ALLOPURINOL 100 MG: 100 TABLET ORAL at 20:39

## 2025-01-09 RX ADMIN — ONDANSETRON 4 MG: 2 INJECTION INTRAMUSCULAR; INTRAVENOUS at 14:10

## 2025-01-09 NOTE — PLAN OF CARE
Goal Outcome Evaluation:  Plan of Care Reviewed With: patient        Progress: improving  Outcome Evaluation: patient alert and oriented,walks with stand by assist, sent from the ED for urology evaluation after possible renal mass, biopsy in am friend at bedside, room air, fistula in left arm,

## 2025-01-09 NOTE — ED NOTES
Nolberto Jackson Jr.    Nursing Report ED to Floor:  Mental status: alert and oriented  Ambulatory status: independent  Oxygen Therapy:  room air  Cardiac Rhythm: normal sinus rhythm  Admitted from: Ed  Safety Concerns:  none noted  Social Issues: none noted  ED Room #:  19    ED Nurse Phone Extension - [phone] or may call [phone].      HPI:   Chief Complaint   Patient presents with    Abdominal Pain       Past Medical History:  Past Medical History:   Diagnosis Date    Colon polyp     HX OF    Dialysis patient     NO DILAYSIS SINCE 2009    Fistula     LEFT ARM, DOES NOT WORK    Glenohumeral arthritis, right 02/01/2024    Gout     Hard of hearing     Hearing aid worn     RIGHT EAR    History of hepatitis C     treated 2009    History of kidney cancer 2017    LEFT KIDNEY    History of transfusion     PATIENT DENIES REACTION    Hyperlipidemia     Hypertension     Liver cirrhosis     MRSA infection 2005    D/T COLON CANCER SURGERY    Status post total shoulder arthroplasty, right 02/01/2024    Wears dentures     UPPER AND LOWER        Past Surgical History:  Past Surgical History:   Procedure Laterality Date    COLECTOMY PARTIAL / TOTAL Left     2005, 2006    COLONOSCOPY      NEPHRECTOMY Left     TOTAL SHOULDER ARTHROPLASTY Right 2/1/2024    Procedure: TOTAL SHOULDER ARTHROPLASTY - RIGHT LEFT SHOULDER STEROID INJECTION;  Surgeon: Jhon Fang MD;  Location:  [facility];  Service: Orthopedics;  Laterality: Right;    TOTAL SHOULDER ARTHROPLASTY W/ DISTAL CLAVICLE EXCISION Left 8/29/2024    Procedure: TOTAL SHOULDER ARTHROPLASTY LEFT;  Surgeon: Jhon Fang MD;  Location:  [facility];  Service: Orthopedics;  Laterality: Left;    URETERECTOMY Left 2007    cancer        Admitting Doctor:   Chayo Boyce MD    Consulting Provider(s):  Consults       Date and Time Order Name Status Description    12/15/2024  9:14 AM Inpatient Gastroenterology Consult Completed              Admitting Diagnosis:   The primary  encounter diagnosis was Pelvic mass in male. Diagnoses of Sepsis, due to unspecified organism, unspecified whether acute organ dysfunction present and Stage 4 chronic kidney disease were also pertinent to this visit.    Most Recent Vitals:   Vitals:    01/09/25 1539 01/09/25 1544 01/09/25 1549 01/09/25 1554   BP:       BP Location:       Patient Position:       Pulse: 89 93 89 92   Resp:       Temp:       TempSrc:       SpO2: 99% 100% 96% 100%   Weight:       Height:           Active LDAs/IV Access:   Lines, Drains & Airways       Active LDAs       Name Placement date Placement time Site Days    Peripheral IV 01/09/25 1346 Anterior;Right Forearm 01/09/25  1346  Forearm  less than 1    Nerve Block Catheter 08/29/24 0851 Lauro Coleman CRNA left interscalene 08/29/24  0851  created via procedure documentation  -- 133                    Labs (abnormal labs have a star):   Labs Reviewed   COMPREHENSIVE METABOLIC PANEL - Abnormal; Notable for the following components:       Result Value    Glucose 102 (*)     BUN 45 (*)     Creatinine 3.42 (*)     Sodium 135 (*)     CO2 20.0 (*)     Alkaline Phosphatase 151 (*)     eGFR 18.9 (*)     All other components within normal limits    Narrative:     GFR Categories in Chronic Kidney Disease (CKD)      GFR Category          GFR (mL/min/1.73)    Interpretation  G1                     90 or greater         Normal or high (1)  G2                      60-89                Mild decrease (1)  G3a                   45-59                Mild to moderate decrease  G3b                   30-44                Moderate to severe decrease  G4                    15-29                Severe decrease  G5                    14 or less           Kidney failure          (1)In the absence of evidence of kidney disease, neither GFR category G1 or G2 fulfill the criteria for CKD.    eGFR calculation 2021 CKD-EPI creatinine equation, which does not include race as a factor   URINALYSIS W/ MICROSCOPIC IF  INDICATED (NO CULTURE) - Abnormal; Notable for the following components:    Appearance, UA Cloudy (*)     Ketones, UA Trace (*)     Blood, UA Large (3+) (*)     Protein,  mg/dL (2+) (*)     Leuk Esterase, UA Small (1+) (*)     All other components within normal limits   CBC WITH AUTO DIFFERENTIAL - Abnormal; Notable for the following components:    WBC 19.09 (*)     RBC 3.23 (*)     Hemoglobin 9.2 (*)     Hematocrit 29.5 (*)     MCHC 31.2 (*)     Neutrophil % 86.6 (*)     Lymphocyte % 3.7 (*)     Immature Grans % 1.9 (*)     Neutrophils, Absolute 16.55 (*)     Monocytes, Absolute 1.08 (*)     Immature Grans, Absolute 0.36 (*)     All other components within normal limits   TROPONIN - Abnormal; Notable for the following components:    HS Troponin T 26 (*)     All other components within normal limits    Narrative:     High Sensitive Troponin T Reference Range:  <14.0 ng/L- Negative Female for AMI  <22.0 ng/L- Negative Male for AMI  >=14 - Abnormal Female indicating possible myocardial injury.  >=22 - Abnormal Male indicating possible myocardial injury.   Clinicians would have to utilize clinical acumen, EKG, Troponin, and serial changes to determine if it is an Acute Myocardial Infarction or myocardial injury due to an underlying chronic condition.        BNP (IN-HOUSE) - Abnormal; Notable for the following components:    proBNP 1,017.0 (*)     All other components within normal limits    Narrative:     This assay is used as an aid in the diagnosis of individuals suspected of having heart failure. It can be used as an aid in the diagnosis of acute decompensated heart failure (ADHF) in patients presenting with signs and symptoms of ADHF to the emergency department (ED). In addition, NT-proBNP of <300 pg/mL indicates ADHF is not likely.    Age Range Result Interpretation  NT-proBNP Concentration (pg/mL:      <50             Positive            >450                   Gray                 300-450                     "Negative             <300    50-75           Positive            >900                  Gray                300-900                  Negative            <300      >75             Positive            >1800                  Gray                300-1800                  Negative            <300   PROCALCITONIN - Abnormal; Notable for the following components:    Procalcitonin 0.41 (*)     All other components within normal limits    Narrative:     As a Marker for Sepsis (Non-Neonates):    1. <0.5 ng/mL represents a low risk of severe sepsis and/or septic shock.  2. >2 ng/mL represents a high risk of severe sepsis and/or septic shock.    As a Marker for Lower Respiratory Tract Infections that require antibiotic therapy:    PCT on Admission    Antibiotic Therapy       6-12 Hrs later    >0.5                Strongly Recommended  >0.25 - <0.5        Recommended   0.1 - 0.25          Discouraged              Remeasure/reassess PCT  <0.1                Strongly Discouraged     Remeasure/reassess PCT    As 28 day mortality risk marker: \"Change in Procalcitonin Result\" (>80% or <=80%) if Day 0 (or Day 1) and Day 4 values are available. Refer to [email or web address] [email or web address]-[email or web address]    Change in PCT <=80%  A decrease of PCT levels below or equal to 80% defines a positive change in PCT test result representing a higher risk for 28-day all-cause mortality of patients diagnosed with severe sepsis for septic shock.    Change in PCT >80%  A decrease of PCT levels of more than 80% defines a negative change in PCT result representing a lower risk for 28-day all-cause mortality of patients diagnosed with severe sepsis or septic shock.      URINALYSIS, MICROSCOPIC ONLY - Abnormal; Notable for the following components:    RBC, UA 21-50 (*)     WBC, UA 6-10 (*)     All other components within normal limits   LIPASE - Normal   LACTIC ACID, PLASMA - Normal   BLOOD CULTURE   BLOOD CULTURE   RAINBOW DRAW    Narrative:     The following orders " were created for panel order [place] Draw.  Procedure                               Abnormality         Status                     ---------                               -----------         ------                     Green Top (Gel)[[id number]]                                  Final result               Lavender Top[[id number]]                                     Final result               Gold Top - SST[[id number]]                                   Final result               Pascual Top[[id number]]                                         Final result               Light Blue Top[[id number]]                                   Final result                 Please view results for these tests on the individual orders.   POCT CREATININE   CBC AND DIFFERENTIAL    Narrative:     The following orders were created for panel order CBC & Differential.  Procedure                               Abnormality         Status                     ---------                               -----------         ------                     CBC Auto Differential[[id number]]        Abnormal            Final result                 Please view results for these tests on the individual orders.   GREEN TOP   LAVENDER TOP   GOLD TOP - SST   GRAY TOP   LIGHT BLUE TOP       Meds Given in ED:   Medications   Sodium Chloride (PF) 0.9 % 10 mL (has no administration in time range)   piperacillin-tazobactam (ZOSYN) 3.375 g IVPB in 100 mL NS MBP (CD) (has no administration in time range)   Morphine sulfate (PF) injection 4 mg (4 mg Intravenous Given 1/9/25 1410)   ondansetron (ZOFRAN) injection 4 mg (4 mg Intravenous Given 1/9/25 1410)   sepsis fluid NS 0.9 % bolus 2,382 mL (2,382 mL Intravenous New Bag 1/9/25 1409)   iopamidol (ISOVUE-300) 61 % injection 80 mL (80 mL Intravenous Given 1/9/25 1449)           Last NIH score:                                                          Dysphagia screening results:        [place]ece Coma Scale:  No data recorded     CIWA:         Restraint Type:            Isolation Status:  No active isolations

## 2025-01-09 NOTE — H&P
[facility] Medicine Services  HISTORY AND PHYSICAL    Patient Name: Nolberto Jackson Jr.  : 1955  MRN: [id number]  Primary Care Physician: Tammy Myers MD  Date of admission: 2025    Subjective   Subjective     Chief Complaint:  Abdominal pain    HPI:  Nolberto Jackson Jr. is a 69 y.o. male with pmh of HTN, HLD, colon cancer s/p resection, left ureteral cancer s/p left nephrectomy, CKD 4 and recent admission 12/15-24 for partial SBO/ pancreatitis that presents with ongoing low abdominal/ pelvic pain, poor appetite, and dysuria. Presented to pcp office today for evaluation. While in office, BP low and became dizzy standing up. PCP stopped amlodipine. Labs done and noted elevated WBC and directed to ED. WBC 19k abd CT a/p completed with finding of necrotic pelvic mass concerning for neoplasm.        Review of Systems   Constitutional:  Positive for activity change, appetite change and fatigue.   Respiratory: Negative.     Cardiovascular: Negative.    Gastrointestinal:  Positive for abdominal pain and constipation. Negative for nausea.   Musculoskeletal:  Positive for back pain.   Skin: Negative.    Neurological:  Positive for light-headedness.   Psychiatric/Behavioral: Negative.                  Personal History     Past Medical History:   Diagnosis Date    Colon polyp     HX OF    Dialysis patient     NO DILAYSIS SINCE     Fistula     LEFT ARM, DOES NOT WORK    Glenohumeral arthritis, right 2024    Gout     Hard of hearing     Hearing aid worn     RIGHT EAR    History of hepatitis C     treated     History of kidney cancer 2017    LEFT KIDNEY    History of transfusion     PATIENT DENIES REACTION    Hyperlipidemia     Hypertension     Liver cirrhosis     MRSA infection     D/T COLON CANCER SURGERY    Status post total shoulder arthroplasty, right 2024    Wears dentures     UPPER AND LOWER             Past Surgical History:   Procedure  Laterality Date    COLECTOMY PARTIAL / TOTAL Left     2005, 2006    COLONOSCOPY      NEPHRECTOMY Left     TOTAL SHOULDER ARTHROPLASTY Right 2/1/2024    Procedure: TOTAL SHOULDER ARTHROPLASTY - RIGHT LEFT SHOULDER STEROID INJECTION;  Surgeon: Jhon Fang MD;  Location: [facility] OR;  Service: Orthopedics;  Laterality: Right;    TOTAL SHOULDER ARTHROPLASTY W/ DISTAL CLAVICLE EXCISION Left 8/29/2024    Procedure: TOTAL SHOULDER ARTHROPLASTY LEFT;  Surgeon: Jhon Fang MD;  Location: [facility] OR;  Service: Orthopedics;  Laterality: Left;    URETERECTOMY Left 2007    cancer       Family History:  family history includes No Known Problems in his mother; Pancreatic cancer in his father.     Social History:  reports that he quit smoking about 6 years ago. His smoking use included cigarettes. He has never used smokeless tobacco. He reports that he does not currently use alcohol. He reports that he does not currently use drugs.  Social History     Social History Narrative    Not on file       Medications:  allopurinol, amLODIPine, carvedilol, hydrocortisone, ondansetron, oxyCODONE, and pravastatin    Allergies   Allergen Reactions    Shellfish Allergy Unknown - Low Severity    Diphenhydramine Hcl Delirium    Midazolam Other (See Comments)     Other reaction(s): N+V - Nausea and vomiting, N+V - Nausea and vomiting    Zolpidem Delirium     Other reaction(s): nightmares, nightmares       Objective   Objective     Vital Signs:   Temp:  [97.8 °F (36.6 °C)] 97.8 °F (36.6 °C)  Heart Rate:  [] 88  Resp:  [18] 18  BP: (102-129)/(64-73) 129/73    Physical Exam   Constitutional: No acute distress, awake, alert  HENT: NCAT, mucous membranes moist  Respiratory: Clear to auscultation bilaterally, respiratory effort normal   Cardiovascular: RRR, no murmurs, rubs, or gallops  Gastrointestinal: Positive bowel sounds, soft, mild tenderness low abd and back nondistended  Musculoskeletal: No bilateral ankle  edema  Psychiatric: Appropriate affect, cooperative  Neurologic: Oriented x 3, strength symmetric in all extremities, Cranial Nerves grossly intact to confrontation, speech clear  Skin: No rashes      Result Review:  I have personally reviewed the results from the time of this admission to 1/9/2025 17:45 EST and agree with these findings:  [x]  Laboratory list / accordion  []  Microbiology  [x]  Radiology  [x]  EKG/Telemetry   []  Cardiology/Vascular   []  Pathology  []  Old records  []  Other:  Most notable findings include:     LAB RESULTS:      Lab 01/09/25  1338   WBC 19.09*   HEMOGLOBIN 9.2*   HEMATOCRIT 29.5*   PLATELETS 151   NEUTROS ABS 16.55*   IMMATURE GRANS (ABS) 0.36*   LYMPHS ABS 0.70   MONOS ABS 1.08*   EOS ABS 0.36   MCV 91.3   PROCALCITONIN 0.41*   LACTATE 1.9   PROTIME 16.4*   APTT 33.1*         Lab 01/09/25  1422 01/09/25  1338   SODIUM  --  135*   POTASSIUM  --  4.3   CHLORIDE  --  100   CO2  --  20.0*   ANION GAP  --  15.0   BUN  --  45*   CREATININE 3.90* 3.42*   EGFR  --  18.9*   GLUCOSE  --  102*   CALCIUM  --  10.2         Lab 01/09/25  1338   TOTAL PROTEIN 6.5   ALBUMIN 3.6   GLOBULIN 2.9   ALT (SGPT) 20   AST (SGOT) 19   BILIRUBIN 0.7   ALK PHOS 151*   LIPASE 34         Lab 01/09/25  1422 01/09/25  1338   PROBNP  --  1,017.0*   HSTROP T 26*  --    PROTIME  --  16.4*   INR  --  1.31*                 Brief Urine Lab Results  (Last result in the past 365 days)        Color   Clarity   Blood   Leuk Est   Nitrite   Protein   CREAT   Urine HCG        01/09/25 1406 Yellow   Cloudy   Large (3+)   Small (1+)   Negative   100 mg/dL (2+)                 Microbiology Results (last 10 days)       ** No results found for the last 240 hours. **            CT Abdomen Pelvis With Contrast    Result Date: 1/9/2025  CT CHEST W CONTRAST DIAGNOSTIC, CT ABDOMEN PELVIS W CONTRAST Date of Exam: 1/9/2025 2:36 PM EST Indication: Dyspnea, chest pain. Comparison: None available. Technique: Axial CT images were  obtained of the chest, abdomen and pelvis after the uneventful intravenous administration of 80 mL Isovue-370.  Reconstructed coronal and sagittal images were also obtained. Automated exposure control and iterative construction methods were used. Findings: CT chest: There is no pathologic axillary adenopathy or other worrisome body wall soft tissue finding in the chest. There is no pleural or pericardial effusion. There is no distinct pathologic mediastinal or hilar lymphadenopathy. Nonaneurysmal thoracic aorta. The central pulmonary arteries are patent. The lung fields demonstrate some mild emphysema and lower lung volume loss. There is no evidence of acute infectious process or distinct suspicious focal pulmonary nodularity. The osseous structures demonstrate no evidence of acute fracture or aggressive osseous lesion. CT abdomen pelvis: The body wall soft tissues demonstrate no acute or suspicious findings. The osseous structures demonstrate no evidence of acute fracture or aggressive osseous lesion, with multilevel spondylosis present. The liver demonstrates homogeneous enhancement. There is mild intrahepatic and extrahepatic biliary ductal prominence without obstructing stone or mass visualized. The spleen is moderately enlarged measuring 16 cm craniocaudal. The pancreas demonstrates homogeneous enhancement  without acute inflammatory change. The right kidney demonstrates no acute or suspicious findings with some atrophy and simple appearing cysts noted. Small and large bowel loops are nondilated. There is no suspicious focal bowel wall thickening. There is  no free fluid or pneumoperitoneum. Within the pelvis there is a large multinodular mass present, with areas of central fluid density either cystic or reflecting necrosis measuring approximately 10 x 7 x 9 cm, concerning for either bladder mass or primary prostate neoplasm. There is no evidence of ureteral obstruction on the right. There is no pathologic  retroperitoneal adenopathy. Mildly atherosclerotic, nonaneurysmal abdominal aorta.     Impression: Impression: Nonspecific intra and extrapelvic biliary ductal prominence without obstructing stone or mass present. Correlate with any clinical concern for biliary obstruction. 10 x 7 x 9 cm multinodular centrally cystic or necrotic pelvic mass either arising from the bladder or prostate concerning for possible neoplasm. Consider urology consultation. No acute findings in the chest. Electronically Signed: Tio Vogt MD  1/9/2025 3:22 PM EST  Workstation ID: [id number]    CT Chest With Contrast Diagnostic    Result Date: 1/9/2025  CT CHEST W CONTRAST DIAGNOSTIC, CT ABDOMEN PELVIS W CONTRAST Date of Exam: 1/9/2025 2:36 PM EST Indication: Dyspnea, chest pain. Comparison: None available. Technique: Axial CT images were obtained of the chest, abdomen and pelvis after the uneventful intravenous administration of 80 mL Isovue-370.  Reconstructed coronal and sagittal images were also obtained. Automated exposure control and iterative construction methods were used. Findings: CT chest: There is no pathologic axillary adenopathy or other worrisome body wall soft tissue finding in the chest. There is no pleural or pericardial effusion. There is no distinct pathologic mediastinal or hilar lymphadenopathy. Nonaneurysmal thoracic aorta. The central pulmonary arteries are patent. The lung fields demonstrate some mild emphysema and lower lung volume loss. There is no evidence of acute infectious process or distinct suspicious focal pulmonary nodularity. The osseous structures demonstrate no evidence of acute fracture or aggressive osseous lesion. CT abdomen pelvis: The body wall soft tissues demonstrate no acute or suspicious findings. The osseous structures demonstrate no evidence of acute fracture or aggressive osseous lesion, with multilevel spondylosis present. The liver demonstrates homogeneous enhancement. There is mild  intrahepatic and extrahepatic biliary ductal prominence without obstructing stone or mass visualized. The spleen is moderately enlarged measuring 16 cm craniocaudal. The pancreas demonstrates homogeneous enhancement  without acute inflammatory change. The right kidney demonstrates no acute or suspicious findings with some atrophy and simple appearing cysts noted. Small and large bowel loops are nondilated. There is no suspicious focal bowel wall thickening. There is  no free fluid or pneumoperitoneum. Within the pelvis there is a large multinodular mass present, with areas of central fluid density either cystic or reflecting necrosis measuring approximately 10 x 7 x 9 cm, concerning for either bladder mass or primary prostate neoplasm. There is no evidence of ureteral obstruction on the right. There is no pathologic retroperitoneal adenopathy. Mildly atherosclerotic, nonaneurysmal abdominal aorta.     Impression: Impression: Nonspecific intra and extrapelvic biliary ductal prominence without obstructing stone or mass present. Correlate with any clinical concern for biliary obstruction. 10 x 7 x 9 cm multinodular centrally cystic or necrotic pelvic mass either arising from the bladder or prostate concerning for possible neoplasm. Consider urology consultation. No acute findings in the chest. Electronically Signed: Tio Vogt MD  1/9/2025 3:22 PM EST  Workstation ID: [id number]         Assessment & Plan   Assessment & Plan       Bladder mass    HTN (hypertension)    CKD (chronic kidney disease) stage 4, GFR 15-29 ml/min    History of colon cancer    History of kidney cancer    Leukocytosis    Pelvic Mass  Leukocytosis  Dysuria   -- CT a/p : 10x7x9 cm multinodular centrally cystic or necrotic pelvic mass arising from either bladder or prostate concerning for neoplasm. No acute chest pathology. Nonspecific intra/ extrapelvic biliary ductal prominency wo obstructing stone or mass.  -- Dr. Olivia consulted to see  patient  -- NPO after MN for Ct guided bx  -- pain control with oxycodone, tylenol and diluadid prn  -- prn zofran  -- u/a with large RBCs  -- zosyn started, continue for possible obstructive infection    CKD4  -- baseline creatinine ~ 3.4 and GFR 18  -- boluses given in ED   -- continue MIVF post contrast. Follow creatinine    HTN  -- low bp, hold amlodipine    HLD  -- statin    History of colon cancer 2006 s/p resection  History of TCC left ureter s/p left neprhrectomy 2017    DVT prophylaxis:  mechanical    CODE STATUS:    Level Of Support Discussed With: Patient  Code Status (Patient has no pulse and is not breathing): CPR (Attempt to Resuscitate)  Medical Interventions (Patient has pulse or is breathing): Full Support      Expected Discharge  Expected Discharge Date: 1/12/2025; Expected Discharge Time:       This note has been completed as part of a split-shared workflow.   Electronically signed by RAYNE Stewart, 01/09/25, 5:45 PM EST.

## 2025-01-09 NOTE — CONSULTS
Consult    Patient Name: Nolberto Jackson Jr.  Medical Record Number: [id number]  YOB: 1955    Date of consultation: 1/9/2025    Referring Provider: Austen  Reason for Consultation: Pelvic mass    Patient Care Team:  Tammy Myres MD as PCP - General (Family Medicine)    Chief complaint   Chief Complaint   Patient presents with    Abdominal Pain       Subjective .     History of present illness:    This is a patient well-known to me with a history of transitional cell carcinoma.  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  He had a recurrence and underwent left nephro ureterectomy 2017 with pathology showing transitional cell carcinoma stage T3 with benign margins.  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    On this admission he was having abdominal pain across the lower abdomen.  He also was noted to have a leukocytosis.  He is not in a chronic renal failure which is stable    Patient has had recent CT scan imaging at an outside hospitals without contrast of the did not demonstrate pelvic mass.    Patient did undergo cystoscopy with right retrograde pyelogram in June 2024 to did not demonstrate any recurrence.    Past Medical History:   Diagnosis Date    Colon polyp     HX OF    Dialysis patient     NO DILAYSIS SINCE 2009    Fistula     LEFT ARM, DOES NOT WORK    Glenohumeral arthritis, right 02/01/2024    Gout     Hard of hearing     Hearing aid worn     RIGHT EAR    History of hepatitis C     treated 2009    History of kidney cancer 2017    LEFT KIDNEY    History of transfusion     PATIENT DENIES REACTION    Hyperlipidemia     Hypertension     Liver cirrhosis     MRSA infection 2005    D/T COLON CANCER SURGERY    Status post total shoulder arthroplasty, right 02/01/2024    Wears dentures     UPPER AND LOWER     Past  "Surgical History:   Procedure Laterality Date    COLECTOMY PARTIAL / TOTAL Left     ,     COLONOSCOPY      NEPHRECTOMY Left     TOTAL SHOULDER ARTHROPLASTY Right 2024    Procedure: TOTAL SHOULDER ARTHROPLASTY - RIGHT LEFT SHOULDER STEROID INJECTION;  Surgeon: Jhon Fang MD;  Location: [facility] OR;  Service: Orthopedics;  Laterality: Right;    TOTAL SHOULDER ARTHROPLASTY W/ DISTAL CLAVICLE EXCISION Left 2024    Procedure: TOTAL SHOULDER ARTHROPLASTY LEFT;  Surgeon: Jhon Fang MD;  Location: [facility] OR;  Service: Orthopedics;  Laterality: Left;    URETERECTOMY Left     cancer     Family History   Problem Relation Age of Onset    No Known Problems Mother     Pancreatic cancer Father      Social History     Tobacco Use    Smoking status: Former     Current packs/day: 0.00     Types: Cigarettes     Quit date:      Years since quittin.0    Smokeless tobacco: Never   Vaping Use    Vaping status: Never Used   Substance Use Topics    Alcohol use: Not Currently     Comment: rare    Drug use: Not Currently     (Not in a hospital admission)    Allergies:  Shellfish allergy, Diphenhydramine hcl, Midazolam, and Zolpidem    Review of Systems  Pertinent items are noted in HPI    Objective     Vital Signs   /73   Pulse 88   Temp 97.8 °F (36.6 °C) (Oral)   Resp 18   Ht 170.2 cm (67\")   Wt 79.4 kg (175 lb)   SpO2 95%   BMI 27.41 kg/m²     Physical Exam:  General Appearance: No apparent distress  Abdomen: Mild abdominal tenderness suprapubically    Results Review:  Lab Results (last 24 hours)       Procedure Component Value Units Date/Time    aPTT [[id number]]  (Abnormal) Collected: 25    Specimen: Blood Updated: 25     PTT 33.1 seconds     Narrative:      PTT = The equivalent PTT values for the therapeutic range of heparin levels at 0.3 to 0.5 U/ml are 60 to 70 seconds.    Protime-INR [[id number]]  (Abnormal) Collected: 25    Specimen: Blood " Updated: 01/09/25 1723     Protime 16.4 Seconds      INR 1.31    POC Creatinine [[id number]]  (Abnormal) Collected: 01/09/25 1422    Specimen: Blood Updated: 01/09/25 1632     Creatinine 3.90 mg/dL      Comment: Serial Number: [id number]:  [id number]       Urinalysis, Microscopic Only - Urine, Clean Catch [[id number]]  (Abnormal) Collected: 01/09/25 1406    Specimen: Urine, Clean Catch Updated: 01/09/25 1451     RBC, UA 21-50 /HPF      WBC, UA 6-10 /HPF      Bacteria, UA Trace /HPF      Squamous Epithelial Cells, UA 0-2 /HPF      Transitional Epithelial Cells, UA 0-2 /HPF      Hyaline Casts, UA 0-6 /LPF      WBC Casts 0-2 /LPF      Granular Casts, UA 0-2 /LPF      Methodology Manual Light Microscopy    High Sensitivity Troponin T [[id number]]  (Abnormal) Collected: 01/09/25 1422    Specimen: Blood Updated: 01/09/25 1450     HS Troponin T 26 ng/L     Narrative:      High Sensitive Troponin T Reference Range:  <14.0 ng/L- Negative Female for AMI  <22.0 ng/L- Negative Male for AMI  >=14 - Abnormal Female indicating possible myocardial injury.  >=22 - Abnormal Male indicating possible myocardial injury.   Clinicians would have to utilize clinical acumen, EKG, Troponin, and serial changes to determine if it is an Acute Myocardial Infarction or myocardial injury due to an underlying chronic condition.         Urinalysis With Microscopic If Indicated (No Culture) - Urine, Clean Catch [[id number]]  (Abnormal) Collected: 01/09/25 1406    Specimen: Urine, Clean Catch Updated: 01/09/25 1442     Color, UA Yellow     Appearance, UA Cloudy     pH, UA 5.5     Specific Gravity, UA 1.017     Glucose, UA Negative     Ketones, UA Trace     Bilirubin, UA Negative     Blood, UA Large (3+)     Protein,  mg/dL (2+)     Leuk Esterase, UA Small (1+)     Nitrite, UA Negative     Urobilinogen, UA 1.0 E.U./dL    Comprehensive Metabolic Panel [[id number]]  (Abnormal) Collected: 01/09/25 1338    Specimen: Blood Updated: 01/09/25 1428      Glucose 102 mg/dL      BUN 45 mg/dL      Creatinine 3.42 mg/dL      Sodium 135 mmol/L      Potassium 4.3 mmol/L      Chloride 100 mmol/L      CO2 20.0 mmol/L      Calcium 10.2 mg/dL      Total Protein 6.5 g/dL      Albumin 3.6 g/dL      ALT (SGPT) 20 U/L      AST (SGOT) 19 U/L      Alkaline Phosphatase 151 U/L      Total Bilirubin 0.7 mg/dL      Globulin 2.9 gm/dL      Comment: Calculated Result        A/G Ratio 1.2 g/dL      BUN/Creatinine Ratio 13.2     Anion Gap 15.0 mmol/L      eGFR 18.9 mL/min/1.73     Narrative:      GFR Categories in Chronic Kidney Disease (CKD)      GFR Category          GFR (mL/min/1.73)    Interpretation  G1                     90 or greater         Normal or high (1)  G2                      60-89                Mild decrease (1)  G3a                   45-59                Mild to moderate decrease  G3b                   30-44                Moderate to severe decrease  G4                    15-29                Severe decrease  G5                    14 or less           Kidney failure          (1)In the absence of evidence of kidney disease, neither GFR category G1 or G2 fulfill the criteria for CKD.    eGFR calculation 2021 CKD-EPI creatinine equation, which does not include race as a factor    Lipase [[id number]]  (Normal) Collected: 01/09/25 1338    Specimen: Blood Updated: 01/09/25 1425     Lipase 34 U/L     Procalcitonin [[id number]]  (Abnormal) Collected: 01/09/25 1338    Specimen: Blood Updated: 01/09/25 1423     Procalcitonin 0.41 ng/mL     Narrative:      As a Marker for Sepsis (Non-Neonates):    1. <0.5 ng/mL represents a low risk of severe sepsis and/or septic shock.  2. >2 ng/mL represents a high risk of severe sepsis and/or septic shock.    As a Marker for Lower Respiratory Tract Infections that require antibiotic therapy:    PCT on Admission    Antibiotic Therapy       6-12 Hrs later    >0.5                Strongly Recommended  >0.25 - <0.5        Recommended   0.1 - 0.25      "     Discouraged              Remeasure/reassess PCT  <0.1                Strongly Discouraged     Remeasure/reassess PCT    As 28 day mortality risk marker: \"Change in Procalcitonin Result\" (>80% or <=80%) if Day 0 (or Day 1) and Day 4 values are available. Refer to [email or web address] [facility] [email or web address]    Change in PCT <=80%  A decrease of PCT levels below or equal to 80% defines a positive change in PCT test result representing a higher risk for 28-day all-cause mortality of patients diagnosed with severe sepsis for septic shock.    Change in PCT >80%  A decrease of PCT levels of more than 80% defines a negative change in PCT result representing a lower risk for 28-day all-cause mortality of patients diagnosed with severe sepsis or septic shock.       BNP [[id number]]  (Abnormal) Collected: 01/09/25 1338    Specimen: Blood Updated: 01/09/25 1420     proBNP 1,017.0 pg/mL     Narrative:      This assay is used as an aid in the diagnosis of individuals suspected of having heart failure. It can be used as an aid in the diagnosis of acute decompensated heart failure (ADHF) in patients presenting with signs and symptoms of ADHF to the emergency department (ED). In addition, NT-proBNP of <300 pg/mL indicates ADHF is not likely.    Age Range Result Interpretation  NT-proBNP Concentration (pg/mL:      <50             Positive            >450                   Gray                 300-450                    Negative             <300    50-75           Positive            >900                  Gray                300-900                  Negative            <300      >75             Positive            >1800                  Gray                300-1800                  Negative            <300    Lactic Acid, Plasma [[id number]]  (Normal) Collected: 01/09/25 1338    Specimen: Blood Updated: 01/09/25 1418     Lactate 1.9 mmol/L      Comment: Falsely depressed results may occur on samples drawn from patients receiving " N-Acetylcysteine (NAC) or Metamizole.       CBC & Differential [[id number]]  (Abnormal) Collected: 01/09/25 1338    Specimen: Blood Updated: 01/09/25 1350    Narrative:      The following orders were created for panel order CBC & Differential.  Procedure                               Abnormality         Status                     ---------                               -----------         ------                     CBC Auto Differential[[id number]]        Abnormal            Final result                 Please view results for these tests on the individual orders.    CBC Auto Differential [[id number]]  (Abnormal) Collected: 01/09/25 1338    Specimen: Blood Updated: 01/09/25 1350     WBC 19.09 10*3/mm3      RBC 3.23 10*6/mm3      Hemoglobin 9.2 g/dL      Hematocrit 29.5 %      MCV 91.3 fL      MCH 28.5 pg      MCHC 31.2 g/dL      RDW 13.2 %      RDW-SD 43.7 fl      MPV 10.3 fL      Platelets 151 10*3/mm3      Neutrophil % 86.6 %      Lymphocyte % 3.7 %      Monocyte % 5.7 %      Eosinophil % 1.9 %      Basophil % 0.2 %      Immature Grans % 1.9 %      Neutrophils, Absolute 16.55 10*3/mm3      Lymphocytes, Absolute 0.70 10*3/mm3      Monocytes, Absolute 1.08 10*3/mm3      Eosinophils, Absolute 0.36 10*3/mm3      Basophils, Absolute 0.04 10*3/mm3      Immature Grans, Absolute 0.36 10*3/mm3      nRBC 0.0 /100 WBC     Blood Culture - Blood, Arm, Right [[id number]] Collected: 01/09/25 1338    Specimen: Blood from Arm, Right Updated: 01/09/25 1350    Blood Culture - Blood, Wrist, Right [[id number]] Collected: 01/09/25 1338    Specimen: Blood from Wrist, Right Updated: 01/09/25 1350    Hunter Draw [[id number]] Collected: 01/09/25 1338    Specimen: Blood Updated: 01/09/25 1345    Narrative:      The following orders were created for panel order Hunter Draw.  Procedure                               Abnormality         Status                     ---------                               -----------         ------                      Green Top (Gel)[[id number]]                                  Final result               Lavender Top[[id number]]                                     Final result               Gold Top - SST[[id number]]                                   Final result               Pascual Top[[id number]]                                         Final result               Light Blue Top[[id number]]                                   Final result                 Please view results for these tests on the individual orders.    Green Top (Gel) [[id number]] Collected: 01/09/25 1338    Specimen: Blood Updated: 01/09/25 1345     Extra Tube Hold for add-ons.     Comment: Auto resulted.       Lavender Top [[id number]] Collected: 01/09/25 1338    Specimen: Blood Updated: 01/09/25 1345     Extra Tube hold for add-on     Comment: Auto resulted       Gold Top - SST [[id number]] Collected: 01/09/25 1338    Specimen: Blood Updated: 01/09/25 1345     Extra Tube Hold for add-ons.     Comment: Auto resulted.       Gray Top [[id number]] Collected: 01/09/25 1338    Specimen: Blood Updated: 01/09/25 1345     Extra Tube Hold for add-ons.     Comment: Auto resulted.       Light Blue Top [[id number]] Collected: 01/09/25 1338    Specimen: Blood Updated: 01/09/25 1345     Extra Tube Hold for add-ons.     Comment: Auto resulted             Imaging Results (Last 72 Hours)       Procedure Component Value Units Date/Time    CT Abdomen Pelvis With Contrast [[id number]] Collected: 01/09/25 1503     Updated: 01/09/25 1525    Narrative:      CT CHEST W CONTRAST DIAGNOSTIC, CT ABDOMEN PELVIS W CONTRAST    Date of Exam: 1/9/2025 2:36 PM EST    Indication: Dyspnea, chest pain.    Comparison: None available.    Technique: Axial CT images were obtained of the chest, abdomen and pelvis after the uneventful intravenous administration of 80 mL Isovue-370.  Reconstructed coronal and sagittal images were also obtained. Automated exposure control and iterative   construction methods were  used.      Findings:  CT chest: There is no pathologic axillary adenopathy or other worrisome body wall soft tissue finding in the chest. There is no pleural or pericardial effusion. There is no distinct pathologic mediastinal or hilar lymphadenopathy. Nonaneurysmal thoracic   aorta. The central pulmonary arteries are patent. The lung fields demonstrate some mild emphysema and lower lung volume loss. There is no evidence of acute infectious process or distinct suspicious focal pulmonary nodularity. The osseous structures   demonstrate no evidence of acute fracture or aggressive osseous lesion.    CT abdomen pelvis: The body wall soft tissues demonstrate no acute or suspicious findings. The osseous structures demonstrate no evidence of acute fracture or aggressive osseous lesion, with multilevel spondylosis present. The liver demonstrates   homogeneous enhancement. There is mild intrahepatic and extrahepatic biliary ductal prominence without obstructing stone or mass visualized. The spleen is moderately enlarged measuring 16 cm craniocaudal. The pancreas demonstrates homogeneous enhancement   without acute inflammatory change. The right kidney demonstrates no acute or suspicious findings with some atrophy and simple appearing cysts noted. Small and large bowel loops are nondilated. There is no suspicious focal bowel wall thickening. There is   no free fluid or pneumoperitoneum. Within the pelvis there is a large multinodular mass present, with areas of central fluid density either cystic or reflecting necrosis measuring approximately 10 x 7 x 9 cm, concerning for either bladder mass or   primary prostate neoplasm. There is no evidence of ureteral obstruction on the right. There is no pathologic retroperitoneal adenopathy. Mildly atherosclerotic, nonaneurysmal abdominal aorta.      Impression:      Impression:  Nonspecific intra and extrapelvic biliary ductal prominence without obstructing stone or mass present.  Correlate with any clinical concern for biliary obstruction.    10 x 7 x 9 cm multinodular centrally cystic or necrotic pelvic mass either arising from the bladder or prostate concerning for possible neoplasm. Consider urology consultation.    No acute findings in the chest.      Electronically Signed: Tio Vogt MD    1/9/2025 3:22 PM EST    Workstation ID: [id number]    CT Chest With Contrast Diagnostic [[id number]] Collected: 01/09/25 1503     Updated: 01/09/25 1525    Narrative:      CT CHEST W CONTRAST DIAGNOSTIC, CT ABDOMEN PELVIS W CONTRAST    Date of Exam: 1/9/2025 2:36 PM EST    Indication: Dyspnea, chest pain.    Comparison: None available.    Technique: Axial CT images were obtained of the chest, abdomen and pelvis after the uneventful intravenous administration of 80 mL Isovue-370.  Reconstructed coronal and sagittal images were also obtained. Automated exposure control and iterative   construction methods were used.      Findings:  CT chest: There is no pathologic axillary adenopathy or other worrisome body wall soft tissue finding in the chest. There is no pleural or pericardial effusion. There is no distinct pathologic mediastinal or hilar lymphadenopathy. Nonaneurysmal thoracic   aorta. The central pulmonary arteries are patent. The lung fields demonstrate some mild emphysema and lower lung volume loss. There is no evidence of acute infectious process or distinct suspicious focal pulmonary nodularity. The osseous structures   demonstrate no evidence of acute fracture or aggressive osseous lesion.    CT abdomen pelvis: The body wall soft tissues demonstrate no acute or suspicious findings. The osseous structures demonstrate no evidence of acute fracture or aggressive osseous lesion, with multilevel spondylosis present. The liver demonstrates   homogeneous enhancement. There is mild intrahepatic and extrahepatic biliary ductal prominence without obstructing stone or mass visualized. The spleen is  moderately enlarged measuring 16 cm craniocaudal. The pancreas demonstrates homogeneous enhancement   without acute inflammatory change. The right kidney demonstrates no acute or suspicious findings with some atrophy and simple appearing cysts noted. Small and large bowel loops are nondilated. There is no suspicious focal bowel wall thickening. There is   no free fluid or pneumoperitoneum. Within the pelvis there is a large multinodular mass present, with areas of central fluid density either cystic or reflecting necrosis measuring approximately 10 x 7 x 9 cm, concerning for either bladder mass or   primary prostate neoplasm. There is no evidence of ureteral obstruction on the right. There is no pathologic retroperitoneal adenopathy. Mildly atherosclerotic, nonaneurysmal abdominal aorta.      Impression:      Impression:  Nonspecific intra and extrapelvic biliary ductal prominence without obstructing stone or mass present. Correlate with any clinical concern for biliary obstruction.    10 x 7 x 9 cm multinodular centrally cystic or necrotic pelvic mass either arising from the bladder or prostate concerning for possible neoplasm. Consider urology consultation.    No acute findings in the chest.      Electronically Signed: Tio Vogt MD    1/9/2025 3:22 PM EST    Workstation ID: [id number]             I reviewed the patient's new clinical results.      Assessment and Recommendations  Well-known patient to me with a history of transitional cell carcinoma of the kidney, ureter, bladder, urethra.  Studies in June 2024 showed no recurrence.  Pelvic mass seen on CT scan today is an unusual appearing mass which does appear to be coming from the posterior aspect of the bladder.  There does not appear to be an intraluminal component to this which is more consistent with bladder mass.  As such I recommended percutaneous biopsy of the mass.  I discussed this with the patient and his  today and we will need to wait  for pathology results before further recommendations can be made.      Bladder mass    HTN (hypertension)    CKD (chronic kidney disease) stage 4, GFR 15-29 ml/min    History of colon cancer    History of kidney cancer    Leukocytosis    Anemia      I discussed the patients findings and my recommendations with patient, family, and consulting provider    Collins Olivia Jr., MD  01/09/25  17:56 EST

## 2025-01-09 NOTE — ED PROVIDER NOTES
Subjective   History of Present Illness patient is a 69-year-old gentleman who returns to the emergency department brought by friend, complaining of increasing illness, abdominal pain, subjective fever approximately 2 days after he was discharged home from last admission on December 17.  Patient reports that his primary care had ordered and report his white blood cell count 18,000 today advising him to present to the emergency department.  Patient complains of bilateral upper quadrant abdominal pain, right flank pain, as well as dyspnea that began today and chest pain.  Patient is pale appearing.    Review of Systems   Constitutional:  Positive for fatigue.   Respiratory:  Positive for shortness of breath.    Cardiovascular:  Positive for chest pain.   Gastrointestinal:  Positive for abdominal pain.   Genitourinary: Negative.    Musculoskeletal: Negative.    Skin:  Positive for pallor.       Past Medical History:   Diagnosis Date    Colon polyp     HX OF    Dialysis patient     NO DILAYSIS SINCE 2009    Fistula     LEFT ARM, DOES NOT WORK    Glenohumeral arthritis, right 02/01/2024    Gout     Hard of hearing     Hearing aid worn     RIGHT EAR    History of hepatitis C     treated 2009    History of kidney cancer 2017    LEFT KIDNEY    History of transfusion     PATIENT DENIES REACTION    Hyperlipidemia     Hypertension     Liver cirrhosis     MRSA infection 2005    D/T COLON CANCER SURGERY    Status post total shoulder arthroplasty, right 02/01/2024    Wears dentures     UPPER AND LOWER       Allergies   Allergen Reactions    Shellfish Allergy Unknown - Low Severity    Diphenhydramine Hcl Delirium    Midazolam Other (See Comments)     Other reaction(s): N+V - Nausea and vomiting, N+V - Nausea and vomiting    Zolpidem Delirium     Other reaction(s): nightmares, nightmares       Past Surgical History:   Procedure Laterality Date    COLECTOMY PARTIAL / TOTAL Left     2005, 2006    COLONOSCOPY      NEPHRECTOMY Left      TOTAL SHOULDER ARTHROPLASTY Right 2024    Procedure: TOTAL SHOULDER ARTHROPLASTY - RIGHT LEFT SHOULDER STEROID INJECTION;  Surgeon: Jhon Fang MD;  Location: [facility] OR;  Service: Orthopedics;  Laterality: Right;    TOTAL SHOULDER ARTHROPLASTY W/ DISTAL CLAVICLE EXCISION Left 2024    Procedure: TOTAL SHOULDER ARTHROPLASTY LEFT;  Surgeon: Jhon Fang MD;  Location:  [facility] OR;  Service: Orthopedics;  Laterality: Left;    URETERECTOMY Left     cancer       Family History   Problem Relation Age of Onset    No Known Problems Mother     Pancreatic cancer Father        Social History     Socioeconomic History    Marital status:    Tobacco Use    Smoking status: Former     Current packs/day: 0.00     Types: Cigarettes     Quit date:      Years since quittin.0    Smokeless tobacco: Never   Vaping Use    Vaping status: Never Used   Substance and Sexual Activity    Alcohol use: Not Currently     Comment: rare    Drug use: Not Currently    Sexual activity: Defer           Objective   Physical Exam  Constitutional:       Appearance: He is well-developed. He is ill-appearing.   HENT:      Head: Normocephalic.   Eyes:      Extraocular Movements: Extraocular movements intact.      Pupils: Pupils are equal, round, and reactive to light.   Cardiovascular:      Rate and Rhythm: Tachycardia present.   Pulmonary:      Effort: Pulmonary effort is normal.      Breath sounds: Normal breath sounds.   Abdominal:      General: Abdomen is flat. Bowel sounds are normal.      Palpations: Abdomen is soft.      Tenderness: There is abdominal tenderness in the right upper quadrant, epigastric area, suprapubic area and left upper quadrant. There is right CVA tenderness. There is no left CVA tenderness.   Skin:     General: Skin is warm and dry.      Capillary Refill: Capillary refill takes less than 2 seconds.      Coloration: Skin is pale.   Neurological:      General: No focal deficit present.       Mental Status: He is alert and oriented to person, place, and time.   Psychiatric:         Mood and Affect: Mood is anxious.         Procedures           ED Course  ED Course as of 01/09/25 1623   Thu Jan 09, 2025   1321 Initial evaluation the patient ED bed 19, accompanied by friend. [JH]   1346 Labs have been collected [JH]   1403 Hemoglobin(!): 9.2  Anemia continues to steadily trend down over the last 3 weeks.  Compared to multiple prior values.  Leukocytosis noted. [RS]   1418 Twelve-lead ECG independently interpreted by myself within normal sinus rhythm 92 bpm, without elevation or depression in anterior leads. [JH]   1419 Lactic acid result within normal limits [JH]   1429 Pro-Jozef elevated, BNP likewise, serum chemistry with CKD stage IV consistent with previous comparisons.  Lipase is negative [JH]   1439 Patient reports that his abdominal pain is improved after analgesic dose.  He is transporting to CT now. [JH]   1459 Urinalysis resulted with hematuria, pyuria to a lesser degree.  Troponin slightly elevated in the setting of his reduced clearance nonactionable.  Awaiting CT imaging studies. [JH]   1535 CT imaging of the abdomen resulted, with concern over a intrapelvic mass arising from uterus or prostate gland measuring 10 x 7 x 9 cm that represents a cystic versus necrotic mass, concern for malignancy.  In the patient with a history of left papillary transitional cell carcinoma and previous nephro ureterectomy in 2017, I will contact the urologist Dr. Olivia who is established by this patient for further recommendations.  In addition, the extra and intra biliary duct dilatation is noted on CT, with recommendation to correlate for obstruction, however serum bilirubin within normal limits. [JH]   1546 With ED pharmacist regarding the questional etiology of the patient's leukocytosis and the necrotic pelvic mass, we will change antibiotics to Zosyn. [JH]   155 I paged the urologist of record Dr Olivia,  via his office, speaking to  Javed. [JH]   1607 Spoke to Dr. Olivia, who reviewed the patient's imaging results, and recommended CT-guided needle biopsy, admission, continued antibiotic coverage, and will see the patient either tonight or tomorrow.  The patient will be kept n.p.o. after midnight.  Will reach out to the hospitalist for admission. [JH]   1623 Hospitalist agreed to admission. [JH]      ED Course User Index  [JH] Martin Lezama APRN  [RS] Jhon Coreas MD                                                       Medical Decision Making  Given the patient's complaints, and apparent outside lab results, and my exam, cannot exclude pancreatitis, cholecystitis, diverticulitis, bowel obstruction, sepsis, urinary tract infection, acute on chronic renal failure, and less likely acute coronary syndrome, pneumonia.  Patient will have twelve-lead ECG, CT imaging of the chest and abdomen pelvis with IV contrast, serum screening labs and blood cultures, urinalysis obtained.  Will administer analgesic and antiemetic medication, reevaluate for improvement of symptoms.  Patient is agreeable with this plan.  All workup results and plan is disposition, including consultation with specialist as indicated and hospitalization.    Amount and/or Complexity of Data Reviewed  Labs: ordered.  Radiology: ordered.  ECG/medicine tests: ordered.    Risk  Prescription drug management.        Final diagnoses:   Pelvic mass in male   Sepsis, due to unspecified organism, unspecified whether acute organ dysfunction present   Stage 4 chronic kidney disease       ED Disposition  ED Disposition       ED Disposition   Decision to Admit    Condition   --    Comment   Level of Care: Telemetry [5]   Diagnosis: Bladder mass [725704]   Admitting Physician: ASHOK PEARSON [[id number]]   Attending Physician: SAHOK PEARSON [[id number]]   Is patient appropriate for Inpatient Observation Unit?: No [0]                 No follow-up  provider specified.       Medication List      No changes were made to your prescriptions during this visit.            Martin Lezama, APRN  01/09/25 0579

## 2025-01-09 NOTE — CASE MANAGEMENT/SOCIAL WORK
Discharge Planning Assessment  [facility]     Patient Name: Nolberto Jackson Jr.  MRN: [id number]  Today's Date: 1/9/2025    Admit Date: 1/9/2025    Plan: IDP   Discharge Needs Assessment       Row Name 01/09/25 1718       Living Environment    Current Living Arrangements home    Potentially Unsafe Housing Conditions unable to assess    In the past 12 months has the electric, gas, oil, or water company threatened to shut off services in your home? No    Primary Care Provided by self    Provides Primary Care For no one    Family Caregiver if Needed sibling(s)    Family Caregiver Names Sukhdev Jackson-brother    Quality of Family Relationships unable to assess    Able to Return to Prior Arrangements yes       Resource/Environmental Concerns    Resource/Environmental Concerns none    Transportation Concerns none       Transportation Needs    In the past 12 months, has lack of transportation kept you from medical appointments or from getting medications? no    In the past 12 months, has lack of transportation kept you from meetings, work, or from getting things needed for daily living? No       Food Insecurity    Within the past 12 months, you worried that your food would run out before you got the money to buy more. Never true    Within the past 12 months, the food you bought just didn't last and you didn't have money to get more. Never true       Transition Planning    Patient/Family Anticipates Transition to home    Transportation Anticipated other (see comments)  may need assistance with transportation       Discharge Needs Assessment    Readmission Within the Last 30 Days unable to assess    Equipment Currently Used at Home none    Do you want help finding or keeping work or a job? I do not need or want help    Do you want help with school or training? For example, starting or completing job training or getting a high school diploma, GED or equivalent No                   Discharge Plan       Row Name 01/09/25 0598        Plan    Plan IDP    Plan Comments Pt. recently discharged from [facility] on 12/17/24. Pt. lives in [facility]. Pt.'s PCP is Tammy Myers. Pt.'s pharmacy is [facility]. Pt.'s insurance is [employer]. Pt. is independent at baseline. No DME, O2, or HH currently. Pt. may need assistance with transportation when medically ready to d/c. Pt. doesn't have an advance directive or ACP documentation on file. CM will continue to follow pt. throughout his stay.                  Continued Care and Services - Admitted Since 1/9/2025    No active coordination exists for this encounter.          Demographic Summary       Row Name 01/09/25 1714       General Information    Admission Type observation    Arrived From home    Referral Source admission list;emergency department    Reason for Consult discharge planning    Preferred Language English                   Functional Status       Row Name 01/09/25 1717       Physical Activity    On average, how many days per week do you engage in moderate to strenuous exercise (like a brisk walk)? 0 days    On average, how many minutes do you engage in exercise at this level? 0 min    Number of minutes of exercise per week 0       Functional Status, IADL    Medications independent    Meal Preparation independent    Housekeeping independent    Laundry independent    Shopping independent    If for any reason you need help with day-to-day activities such as bathing, preparing meals, shopping, managing finances, etc., do you get the help you need? I don't need any help       Mental Status Summary    Recent Changes in Mental Status/Cognitive Functioning unable to assess       Employment/    Employment Status retired                   Psychosocial       Row Name 01/09/25 171       Mental Health    Little interest or pleasure in doing things Not at all    Feeling down, depressed, or hopeless Not at all       Stress    Do you feel stress - tense, restless, nervous, or  anxious, or unable to sleep at night because your mind is troubled all the time - these days? Only a littl                   Abuse/Neglect    No documentation.                  Legal       Row Name 01/09/25 0152       Financial Resource Strain    How hard is it for you to pay for the very basics like food, housing, medical care, and heating? Not very                   Substance Abuse    No documentation.                  Patient Forms    No documentation.                     EASTON Chatman

## 2025-01-09 NOTE — H&P (VIEW-ONLY)
Consult    Patient Name: Nolberto Jackson Jr.  Medical Record Number: [id number]  YOB: 1955    Date of consultation: 1/9/2025    Referring Provider: Austen  Reason for Consultation: Pelvic mass    Patient Care Team:  Tammy Myers MD as PCP - General (Family Medicine)    Chief complaint   Chief Complaint   Patient presents with    Abdominal Pain       Subjective .     History of present illness:    This is a patient well-known to me with a history of transitional cell carcinoma.  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  He had a recurrence and underwent left nephro ureterectomy 2017 with pathology showing transitional cell carcinoma stage T3 with benign margins.  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    On this admission he was having abdominal pain across the lower abdomen.  He also was noted to have a leukocytosis.  He is not in a chronic renal failure which is stable    Patient has had recent CT scan imaging at an outside hospitals without contrast of the did not demonstrate pelvic mass.    Patient did undergo cystoscopy with right retrograde pyelogram in June 2024 to did not demonstrate any recurrence.    Past Medical History:   Diagnosis Date    Colon polyp     HX OF    Dialysis patient     NO DILAYSIS SINCE 2009    Fistula     LEFT ARM, DOES NOT WORK    Glenohumeral arthritis, right 02/01/2024    Gout     Hard of hearing     Hearing aid worn     RIGHT EAR    History of hepatitis C     treated 2009    History of kidney cancer 2017    LEFT KIDNEY    History of transfusion     PATIENT DENIES REACTION    Hyperlipidemia     Hypertension     Liver cirrhosis     MRSA infection 2005    D/T COLON CANCER SURGERY    Status post total shoulder arthroplasty, right 02/01/2024    Wears dentures     UPPER AND LOWER     Past  "Surgical History:   Procedure Laterality Date    COLECTOMY PARTIAL / TOTAL Left     ,     COLONOSCOPY      NEPHRECTOMY Left     TOTAL SHOULDER ARTHROPLASTY Right 2024    Procedure: TOTAL SHOULDER ARTHROPLASTY - RIGHT LEFT SHOULDER STEROID INJECTION;  Surgeon: Jhon Fang MD;  Location: [facility] OR;  Service: Orthopedics;  Laterality: Right;    TOTAL SHOULDER ARTHROPLASTY W/ DISTAL CLAVICLE EXCISION Left 2024    Procedure: TOTAL SHOULDER ARTHROPLASTY LEFT;  Surgeon: Jhon Fang MD;  Location: [facility] OR;  Service: Orthopedics;  Laterality: Left;    URETERECTOMY Left     cancer     Family History   Problem Relation Age of Onset    No Known Problems Mother     Pancreatic cancer Father      Social History     Tobacco Use    Smoking status: Former     Current packs/day: 0.00     Types: Cigarettes     Quit date:      Years since quittin.0    Smokeless tobacco: Never   Vaping Use    Vaping status: Never Used   Substance Use Topics    Alcohol use: Not Currently     Comment: rare    Drug use: Not Currently     (Not in a hospital admission)    Allergies:  Shellfish allergy, Diphenhydramine hcl, Midazolam, and Zolpidem    Review of Systems  Pertinent items are noted in HPI    Objective     Vital Signs   /73   Pulse 88   Temp 97.8 °F (36.6 °C) (Oral)   Resp 18   Ht 170.2 cm (67\")   Wt 79.4 kg (175 lb)   SpO2 95%   BMI 27.41 kg/m²     Physical Exam:  General Appearance: No apparent distress  Abdomen: Mild abdominal tenderness suprapubically    Results Review:  Lab Results (last 24 hours)       Procedure Component Value Units Date/Time    aPTT [[id number]]  (Abnormal) Collected: 25    Specimen: Blood Updated: 25     PTT 33.1 seconds     Narrative:      PTT = The equivalent PTT values for the therapeutic range of heparin levels at 0.3 to 0.5 U/ml are 60 to 70 seconds.    Protime-INR [[id number]]  (Abnormal) Collected: 25    Specimen: Blood " Updated: 01/09/25 1723     Protime 16.4 Seconds      INR 1.31    POC Creatinine [[id number]]  (Abnormal) Collected: 01/09/25 1422    Specimen: Blood Updated: 01/09/25 1632     Creatinine 3.90 mg/dL      Comment: Serial Number: [id number]:  [id number]       Urinalysis, Microscopic Only - Urine, Clean Catch [[id number]]  (Abnormal) Collected: 01/09/25 1406    Specimen: Urine, Clean Catch Updated: 01/09/25 1451     RBC, UA 21-50 /HPF      WBC, UA 6-10 /HPF      Bacteria, UA Trace /HPF      Squamous Epithelial Cells, UA 0-2 /HPF      Transitional Epithelial Cells, UA 0-2 /HPF      Hyaline Casts, UA 0-6 /LPF      WBC Casts 0-2 /LPF      Granular Casts, UA 0-2 /LPF      Methodology Manual Light Microscopy    High Sensitivity Troponin T [[id number]]  (Abnormal) Collected: 01/09/25 1422    Specimen: Blood Updated: 01/09/25 1450     HS Troponin T 26 ng/L     Narrative:      High Sensitive Troponin T Reference Range:  <14.0 ng/L- Negative Female for AMI  <22.0 ng/L- Negative Male for AMI  >=14 - Abnormal Female indicating possible myocardial injury.  >=22 - Abnormal Male indicating possible myocardial injury.   Clinicians would have to utilize clinical acumen, EKG, Troponin, and serial changes to determine if it is an Acute Myocardial Infarction or myocardial injury due to an underlying chronic condition.         Urinalysis With Microscopic If Indicated (No Culture) - Urine, Clean Catch [[id number]]  (Abnormal) Collected: 01/09/25 1406    Specimen: Urine, Clean Catch Updated: 01/09/25 1442     Color, UA Yellow     Appearance, UA Cloudy     pH, UA 5.5     Specific Gravity, UA 1.017     Glucose, UA Negative     Ketones, UA Trace     Bilirubin, UA Negative     Blood, UA Large (3+)     Protein,  mg/dL (2+)     Leuk Esterase, UA Small (1+)     Nitrite, UA Negative     Urobilinogen, UA 1.0 E.U./dL    Comprehensive Metabolic Panel [[id number]]  (Abnormal) Collected: 01/09/25 1338    Specimen: Blood Updated: 01/09/25 142      Glucose 102 mg/dL      BUN 45 mg/dL      Creatinine 3.42 mg/dL      Sodium 135 mmol/L      Potassium 4.3 mmol/L      Chloride 100 mmol/L      CO2 20.0 mmol/L      Calcium 10.2 mg/dL      Total Protein 6.5 g/dL      Albumin 3.6 g/dL      ALT (SGPT) 20 U/L      AST (SGOT) 19 U/L      Alkaline Phosphatase 151 U/L      Total Bilirubin 0.7 mg/dL      Globulin 2.9 gm/dL      Comment: Calculated Result        A/G Ratio 1.2 g/dL      BUN/Creatinine Ratio 13.2     Anion Gap 15.0 mmol/L      eGFR 18.9 mL/min/1.73     Narrative:      GFR Categories in Chronic Kidney Disease (CKD)      GFR Category          GFR (mL/min/1.73)    Interpretation  G1                     90 or greater         Normal or high (1)  G2                      60-89                Mild decrease (1)  G3a                   45-59                Mild to moderate decrease  G3b                   30-44                Moderate to severe decrease  G4                    15-29                Severe decrease  G5                    14 or less           Kidney failure          (1)In the absence of evidence of kidney disease, neither GFR category G1 or G2 fulfill the criteria for CKD.    eGFR calculation 2021 CKD-EPI creatinine equation, which does not include race as a factor    Lipase [[id number]]  (Normal) Collected: 01/09/25 1338    Specimen: Blood Updated: 01/09/25 1425     Lipase 34 U/L     Procalcitonin [[id number]]  (Abnormal) Collected: 01/09/25 1338    Specimen: Blood Updated: 01/09/25 1423     Procalcitonin 0.41 ng/mL     Narrative:      As a Marker for Sepsis (Non-Neonates):    1. <0.5 ng/mL represents a low risk of severe sepsis and/or septic shock.  2. >2 ng/mL represents a high risk of severe sepsis and/or septic shock.    As a Marker for Lower Respiratory Tract Infections that require antibiotic therapy:    PCT on Admission    Antibiotic Therapy       6-12 Hrs later    >0.5                Strongly Recommended  >0.25 - <0.5        Recommended   0.1 - 0.25      "     Discouraged              Remeasure/reassess PCT  <0.1                Strongly Discouraged     Remeasure/reassess PCT    As 28 day mortality risk marker: \"Change in Procalcitonin Result\" (>80% or <=80%) if Day 0 (or Day 1) and Day 4 values are available. Refer to [email or web address] [email or web address]-[email or web address]    Change in PCT <=80%  A decrease of PCT levels below or equal to 80% defines a positive change in PCT test result representing a higher risk for 28-day all-cause mortality of patients diagnosed with severe sepsis for septic shock.    Change in PCT >80%  A decrease of PCT levels of more than 80% defines a negative change in PCT result representing a lower risk for 28-day all-cause mortality of patients diagnosed with severe sepsis or septic shock.       BNP [[id number]]  (Abnormal) Collected: 01/09/25 1338    Specimen: Blood Updated: 01/09/25 1420     proBNP 1,017.0 pg/mL     Narrative:      This assay is used as an aid in the diagnosis of individuals suspected of having heart failure. It can be used as an aid in the diagnosis of acute decompensated heart failure (ADHF) in patients presenting with signs and symptoms of ADHF to the emergency department (ED). In addition, NT-proBNP of <300 pg/mL indicates ADHF is not likely.    Age Range Result Interpretation  NT-proBNP Concentration (pg/mL:      <50             Positive            >450                   Gray                 300-450                    Negative             <300    50-75           Positive            >900                  Gray                300-900                  Negative            <300      >75             Positive            >1800                  Gray                300-1800                  Negative            <300    Lactic Acid, Plasma [[id number]]  (Normal) Collected: 01/09/25 1338    Specimen: Blood Updated: 01/09/25 1418     Lactate 1.9 mmol/L      Comment: Falsely depressed results may occur on samples drawn from patients receiving " N-Acetylcysteine (NAC) or Metamizole.       CBC & Differential [[id number]]  (Abnormal) Collected: 01/09/25 1338    Specimen: Blood Updated: 01/09/25 1350    Narrative:      The following orders were created for panel order CBC & Differential.  Procedure                               Abnormality         Status                     ---------                               -----------         ------                     CBC Auto Differential[[id number]]        Abnormal            Final result                 Please view results for these tests on the individual orders.    CBC Auto Differential [[id number]]  (Abnormal) Collected: 01/09/25 1338    Specimen: Blood Updated: 01/09/25 1350     WBC 19.09 10*3/mm3      RBC 3.23 10*6/mm3      Hemoglobin 9.2 g/dL      Hematocrit 29.5 %      MCV 91.3 fL      MCH 28.5 pg      MCHC 31.2 g/dL      RDW 13.2 %      RDW-SD 43.7 fl      MPV 10.3 fL      Platelets 151 10*3/mm3      Neutrophil % 86.6 %      Lymphocyte % 3.7 %      Monocyte % 5.7 %      Eosinophil % 1.9 %      Basophil % 0.2 %      Immature Grans % 1.9 %      Neutrophils, Absolute 16.55 10*3/mm3      Lymphocytes, Absolute 0.70 10*3/mm3      Monocytes, Absolute 1.08 10*3/mm3      Eosinophils, Absolute 0.36 10*3/mm3      Basophils, Absolute 0.04 10*3/mm3      Immature Grans, Absolute 0.36 10*3/mm3      nRBC 0.0 /100 WBC     Blood Culture - Blood, Arm, Right [[id number]] Collected: 01/09/25 1338    Specimen: Blood from Arm, Right Updated: 01/09/25 1350    Blood Culture - Blood, Wrist, Right [[id number]] Collected: 01/09/25 1338    Specimen: Blood from Wrist, Right Updated: 01/09/25 1350    [place] Draw [[id number]] Collected: 01/09/25 1338    Specimen: Blood Updated: 01/09/25 1345    Narrative:      The following orders were created for panel order [place] Draw.  Procedure                               Abnormality         Status                     ---------                               -----------         ------                      Green Top (Gel)[[id number]]                                  Final result               Lavender Top[[id number]]                                     Final result               Gold Top - SST[[id number]]                                   Final result               Pascual Top[[id number]]                                         Final result               Light Blue Top[[id number]]                                   Final result                 Please view results for these tests on the individual orders.    Green Top (Gel) [[id number]] Collected: 01/09/25 1338    Specimen: Blood Updated: 01/09/25 1345     Extra Tube Hold for add-ons.     Comment: Auto resulted.       Lavender Top [[id number]] Collected: 01/09/25 1338    Specimen: Blood Updated: 01/09/25 1345     Extra Tube hold for add-on     Comment: Auto resulted       Gold Top - SST [[id number]] Collected: 01/09/25 1338    Specimen: Blood Updated: 01/09/25 1345     Extra Tube Hold for add-ons.     Comment: Auto resulted.       Gray Top [[id number]] Collected: 01/09/25 1338    Specimen: Blood Updated: 01/09/25 1345     Extra Tube Hold for add-ons.     Comment: Auto resulted.       Light Blue Top [[id number]] Collected: 01/09/25 1338    Specimen: Blood Updated: 01/09/25 1345     Extra Tube Hold for add-ons.     Comment: Auto resulted             Imaging Results (Last 72 Hours)       Procedure Component Value Units Date/Time    CT Abdomen Pelvis With Contrast [[id number]] Collected: 01/09/25 1503     Updated: 01/09/25 1525    Narrative:      CT CHEST W CONTRAST DIAGNOSTIC, CT ABDOMEN PELVIS W CONTRAST    Date of Exam: 1/9/2025 2:36 PM EST    Indication: Dyspnea, chest pain.    Comparison: None available.    Technique: Axial CT images were obtained of the chest, abdomen and pelvis after the uneventful intravenous administration of 80 mL Isovue-370.  Reconstructed coronal and sagittal images were also obtained. Automated exposure control and iterative   construction methods were  used.      Findings:  CT chest: There is no pathologic axillary adenopathy or other worrisome body wall soft tissue finding in the chest. There is no pleural or pericardial effusion. There is no distinct pathologic mediastinal or hilar lymphadenopathy. Nonaneurysmal thoracic   aorta. The central pulmonary arteries are patent. The lung fields demonstrate some mild emphysema and lower lung volume loss. There is no evidence of acute infectious process or distinct suspicious focal pulmonary nodularity. The osseous structures   demonstrate no evidence of acute fracture or aggressive osseous lesion.    CT abdomen pelvis: The body wall soft tissues demonstrate no acute or suspicious findings. The osseous structures demonstrate no evidence of acute fracture or aggressive osseous lesion, with multilevel spondylosis present. The liver demonstrates   homogeneous enhancement. There is mild intrahepatic and extrahepatic biliary ductal prominence without obstructing stone or mass visualized. The spleen is moderately enlarged measuring 16 cm craniocaudal. The pancreas demonstrates homogeneous enhancement   without acute inflammatory change. The right kidney demonstrates no acute or suspicious findings with some atrophy and simple appearing cysts noted. Small and large bowel loops are nondilated. There is no suspicious focal bowel wall thickening. There is   no free fluid or pneumoperitoneum. Within the pelvis there is a large multinodular mass present, with areas of central fluid density either cystic or reflecting necrosis measuring approximately 10 x 7 x 9 cm, concerning for either bladder mass or   primary prostate neoplasm. There is no evidence of ureteral obstruction on the right. There is no pathologic retroperitoneal adenopathy. Mildly atherosclerotic, nonaneurysmal abdominal aorta.      Impression:      Impression:  Nonspecific intra and extrapelvic biliary ductal prominence without obstructing stone or mass present.  Correlate with any clinical concern for biliary obstruction.    10 x 7 x 9 cm multinodular centrally cystic or necrotic pelvic mass either arising from the bladder or prostate concerning for possible neoplasm. Consider urology consultation.    No acute findings in the chest.      Electronically Signed: Tio Vogt MD    1/9/2025 3:22 PM EST    Workstation ID: [id number]    CT Chest With Contrast Diagnostic [[id number]] Collected: 01/09/25 1503     Updated: 01/09/25 1525    Narrative:      CT CHEST W CONTRAST DIAGNOSTIC, CT ABDOMEN PELVIS W CONTRAST    Date of Exam: 1/9/2025 2:36 PM EST    Indication: Dyspnea, chest pain.    Comparison: None available.    Technique: Axial CT images were obtained of the chest, abdomen and pelvis after the uneventful intravenous administration of 80 mL Isovue-370.  Reconstructed coronal and sagittal images were also obtained. Automated exposure control and iterative   construction methods were used.      Findings:  CT chest: There is no pathologic axillary adenopathy or other worrisome body wall soft tissue finding in the chest. There is no pleural or pericardial effusion. There is no distinct pathologic mediastinal or hilar lymphadenopathy. Nonaneurysmal thoracic   aorta. The central pulmonary arteries are patent. The lung fields demonstrate some mild emphysema and lower lung volume loss. There is no evidence of acute infectious process or distinct suspicious focal pulmonary nodularity. The osseous structures   demonstrate no evidence of acute fracture or aggressive osseous lesion.    CT abdomen pelvis: The body wall soft tissues demonstrate no acute or suspicious findings. The osseous structures demonstrate no evidence of acute fracture or aggressive osseous lesion, with multilevel spondylosis present. The liver demonstrates   homogeneous enhancement. There is mild intrahepatic and extrahepatic biliary ductal prominence without obstructing stone or mass visualized. The spleen is  moderately enlarged measuring 16 cm craniocaudal. The pancreas demonstrates homogeneous enhancement   without acute inflammatory change. The right kidney demonstrates no acute or suspicious findings with some atrophy and simple appearing cysts noted. Small and large bowel loops are nondilated. There is no suspicious focal bowel wall thickening. There is   no free fluid or pneumoperitoneum. Within the pelvis there is a large multinodular mass present, with areas of central fluid density either cystic or reflecting necrosis measuring approximately 10 x 7 x 9 cm, concerning for either bladder mass or   primary prostate neoplasm. There is no evidence of ureteral obstruction on the right. There is no pathologic retroperitoneal adenopathy. Mildly atherosclerotic, nonaneurysmal abdominal aorta.      Impression:      Impression:  Nonspecific intra and extrapelvic biliary ductal prominence without obstructing stone or mass present. Correlate with any clinical concern for biliary obstruction.    10 x 7 x 9 cm multinodular centrally cystic or necrotic pelvic mass either arising from the bladder or prostate concerning for possible neoplasm. Consider urology consultation.    No acute findings in the chest.      Electronically Signed: Tio Vgot MD    1/9/2025 3:22 PM EST    Workstation ID: [id number]             I reviewed the patient's new clinical results.      Assessment and Recommendations  Well-known patient to me with a history of transitional cell carcinoma of the kidney, ureter, bladder, urethra.  Studies in June 2024 showed no recurrence.  Pelvic mass seen on CT scan today is an unusual appearing mass which does appear to be coming from the posterior aspect of the bladder.  There does not appear to be an intraluminal component to this which is more consistent with bladder mass.  As such I recommended percutaneous biopsy of the mass.  I discussed this with the patient and his  today and we will need to wait  for pathology results before further recommendations can be made.      Bladder mass    HTN (hypertension)    CKD (chronic kidney disease) stage 4, GFR 15-29 ml/min    History of colon cancer    History of kidney cancer    Leukocytosis    Anemia      I discussed the patients findings and my recommendations with patient, family, and consulting provider    Collins Olivia Jr., MD  01/09/25  17:56 EST

## 2025-01-10 ENCOUNTER — APPOINTMENT (OUTPATIENT)
Dept: CT IMAGING | Facility: HOSPITAL | Age: 70
End: 2025-01-10
Payer: MEDICARE

## 2025-01-10 PROBLEM — E43 SEVERE PROTEIN-CALORIE MALNUTRITION: Status: ACTIVE | Noted: 2025-01-10

## 2025-01-10 LAB
ALBUMIN SERPL-MCNC: 3 G/DL (ref 3.5–5.2)
ALBUMIN/GLOB SERPL: 1 G/DL
ALP SERPL-CCNC: 173 U/L (ref 39–117)
ALT SERPL W P-5'-P-CCNC: 16 U/L (ref 1–41)
ANION GAP SERPL CALCULATED.3IONS-SCNC: 11 MMOL/L (ref 5–15)
AST SERPL-CCNC: 20 U/L (ref 1–40)
BASOPHILS # BLD AUTO: 0.04 10*3/MM3 (ref 0–0.2)
BASOPHILS NFR BLD AUTO: 0.2 % (ref 0–1.5)
BILIRUB SERPL-MCNC: 0.4 MG/DL (ref 0–1.2)
BUN SERPL-MCNC: 37 MG/DL (ref 8–23)
BUN/CREAT SERPL: 11.2 (ref 7–25)
CALCIUM SPEC-SCNC: 9.5 MG/DL (ref 8.6–10.5)
CHLORIDE SERPL-SCNC: 107 MMOL/L (ref 98–107)
CO2 SERPL-SCNC: 18 MMOL/L (ref 22–29)
CREAT SERPL-MCNC: 3.29 MG/DL (ref 0.76–1.27)
DEPRECATED RDW RBC AUTO: 45.6 FL (ref 37–54)
EGFRCR SERPLBLD CKD-EPI 2021: 19.8 ML/MIN/1.73
EOSINOPHIL # BLD AUTO: 0.34 10*3/MM3 (ref 0–0.4)
EOSINOPHIL NFR BLD AUTO: 2.1 % (ref 0.3–6.2)
ERYTHROCYTE [DISTWIDTH] IN BLOOD BY AUTOMATED COUNT: 13.3 % (ref 12.3–15.4)
GLOBULIN UR ELPH-MCNC: 3.1 GM/DL
GLUCOSE SERPL-MCNC: 95 MG/DL (ref 65–99)
HCT VFR BLD AUTO: 27.5 % (ref 37.5–51)
HGB BLD-MCNC: 8.5 G/DL (ref 13–17.7)
IMM GRANULOCYTES # BLD AUTO: 0.14 10*3/MM3 (ref 0–0.05)
IMM GRANULOCYTES NFR BLD AUTO: 0.9 % (ref 0–0.5)
LYMPHOCYTES # BLD AUTO: 0.58 10*3/MM3 (ref 0.7–3.1)
LYMPHOCYTES NFR BLD AUTO: 3.6 % (ref 19.6–45.3)
MCH RBC QN AUTO: 28.9 PG (ref 26.6–33)
MCHC RBC AUTO-ENTMCNC: 30.9 G/DL (ref 31.5–35.7)
MCV RBC AUTO: 93.5 FL (ref 79–97)
MONOCYTES # BLD AUTO: 0.8 10*3/MM3 (ref 0.1–0.9)
MONOCYTES NFR BLD AUTO: 5 % (ref 5–12)
NEUTROPHILS NFR BLD AUTO: 14.23 10*3/MM3 (ref 1.7–7)
NEUTROPHILS NFR BLD AUTO: 88.2 % (ref 42.7–76)
NRBC BLD AUTO-RTO: 0 /100 WBC (ref 0–0.2)
PLATELET # BLD AUTO: 130 10*3/MM3 (ref 140–450)
PMV BLD AUTO: 10.1 FL (ref 6–12)
POTASSIUM SERPL-SCNC: 4.6 MMOL/L (ref 3.5–5.2)
PROT SERPL-MCNC: 6.1 G/DL (ref 6–8.5)
RBC # BLD AUTO: 2.94 10*6/MM3 (ref 4.14–5.8)
SODIUM SERPL-SCNC: 136 MMOL/L (ref 136–145)
WBC NRBC COR # BLD AUTO: 16.13 10*3/MM3 (ref 3.4–10.8)

## 2025-01-10 PROCEDURE — 88342 IMHCHEM/IMCYTCHM 1ST ANTB: CPT | Performed by: UROLOGY

## 2025-01-10 PROCEDURE — 77012 CT SCAN FOR NEEDLE BIOPSY: CPT

## 2025-01-10 PROCEDURE — 25010000002 CEFTRIAXONE PER 250 MG: Performed by: INTERNAL MEDICINE

## 2025-01-10 PROCEDURE — 88307 TISSUE EXAM BY PATHOLOGIST: CPT | Performed by: UROLOGY

## 2025-01-10 PROCEDURE — 80053 COMPREHEN METABOLIC PANEL: CPT | Performed by: NURSE PRACTITIONER

## 2025-01-10 PROCEDURE — G0378 HOSPITAL OBSERVATION PER HR: HCPCS

## 2025-01-10 PROCEDURE — 97166 OT EVAL MOD COMPLEX 45 MIN: CPT

## 2025-01-10 PROCEDURE — 25810000003 SODIUM CHLORIDE 0.9 % SOLUTION: Performed by: NURSE PRACTITIONER

## 2025-01-10 PROCEDURE — 99232 SBSQ HOSP IP/OBS MODERATE 35: CPT | Performed by: INTERNAL MEDICINE

## 2025-01-10 PROCEDURE — 85025 COMPLETE CBC W/AUTO DIFF WBC: CPT | Performed by: NURSE PRACTITIONER

## 2025-01-10 PROCEDURE — 25010000002 LIDOCAINE 1 % SOLUTION: Performed by: RADIOLOGY

## 2025-01-10 PROCEDURE — 88341 IMHCHEM/IMCYTCHM EA ADD ANTB: CPT | Performed by: UROLOGY

## 2025-01-10 PROCEDURE — 97161 PT EVAL LOW COMPLEX 20 MIN: CPT

## 2025-01-10 PROCEDURE — 25010000002 HYDROMORPHONE PER 4 MG: Performed by: NURSE PRACTITIONER

## 2025-01-10 PROCEDURE — 96375 TX/PRO/DX INJ NEW DRUG ADDON: CPT

## 2025-01-10 PROCEDURE — 25010000002 FENTANYL CITRATE (PF) 50 MCG/ML SOLUTION: Performed by: RADIOLOGY

## 2025-01-10 RX ORDER — FENTANYL CITRATE 50 UG/ML
INJECTION, SOLUTION INTRAMUSCULAR; INTRAVENOUS
Status: DISCONTINUED
Start: 2025-01-10 | End: 2025-01-10 | Stop reason: WASHOUT

## 2025-01-10 RX ORDER — FENTANYL CITRATE 50 UG/ML
INJECTION, SOLUTION INTRAMUSCULAR; INTRAVENOUS
Status: DISPENSED
Start: 2025-01-10 | End: 2025-01-11

## 2025-01-10 RX ORDER — FENTANYL CITRATE 50 UG/ML
INJECTION, SOLUTION INTRAMUSCULAR; INTRAVENOUS AS NEEDED
Status: COMPLETED | OUTPATIENT
Start: 2025-01-10 | End: 2025-01-10

## 2025-01-10 RX ORDER — LIDOCAINE HYDROCHLORIDE 10 MG/ML
10 INJECTION, SOLUTION INFILTRATION; PERINEURAL ONCE
Status: COMPLETED | OUTPATIENT
Start: 2025-01-10 | End: 2025-01-10

## 2025-01-10 RX ADMIN — SODIUM CHLORIDE 2000 MG: 900 INJECTION INTRAVENOUS at 09:15

## 2025-01-10 RX ADMIN — LIDOCAINE HYDROCHLORIDE 10 ML: 10 INJECTION, SOLUTION INFILTRATION; PERINEURAL at 14:27

## 2025-01-10 RX ADMIN — HYDROMORPHONE HYDROCHLORIDE 0.5 MG: 1 INJECTION, SOLUTION INTRAMUSCULAR; INTRAVENOUS; SUBCUTANEOUS at 10:29

## 2025-01-10 RX ADMIN — OXYCODONE 5 MG: 5 TABLET ORAL at 20:17

## 2025-01-10 RX ADMIN — FENTANYL CITRATE 50 MCG: 50 INJECTION, SOLUTION INTRAMUSCULAR; INTRAVENOUS at 14:17

## 2025-01-10 RX ADMIN — ALLOPURINOL 100 MG: 100 TABLET ORAL at 09:15

## 2025-01-10 RX ADMIN — FENTANYL CITRATE 50 MCG: 50 INJECTION, SOLUTION INTRAMUSCULAR; INTRAVENOUS at 14:14

## 2025-01-10 RX ADMIN — SENNOSIDES AND DOCUSATE SODIUM 2 TABLET: 50; 8.6 TABLET ORAL at 09:15

## 2025-01-10 RX ADMIN — PRAVASTATIN SODIUM 40 MG: 40 TABLET ORAL at 20:17

## 2025-01-10 RX ADMIN — SODIUM CHLORIDE 75 ML/HR: 9 INJECTION, SOLUTION INTRAVENOUS at 00:44

## 2025-01-10 NOTE — THERAPY DISCHARGE NOTE
Patient Name: Nolberto Jackson Jr.  : 1955    MRN: [id number]                              Today's Date: 1/10/2025       Admit Date: 2025    Visit Dx:     ICD-10-CM ICD-9-CM   1. Pelvic mass in male  R19.00 789.30   2. Sepsis, due to unspecified organism, unspecified whether acute organ dysfunction present  A41.9 038.9     995.91   3. Stage 4 chronic kidney disease  N18.4 585.4     Patient Active Problem List   Diagnosis    HTN (hypertension)    Hyperlipidemia    CKD (chronic kidney disease) stage 4, GFR 15-29 ml/min    History of colon cancer    History of kidney cancer    Bladder mass    Leukocytosis    Anemia     Past Medical History:   Diagnosis Date    Colon polyp     HX OF    Dialysis patient     NO DILAYSIS SINCE     Fistula     LEFT ARM, DOES NOT WORK    Glenohumeral arthritis, right 2024    Gout     Hard of hearing     Hearing aid worn     RIGHT EAR    History of hepatitis C     treated     History of kidney cancer 2017    LEFT KIDNEY    History of transfusion     PATIENT DENIES REACTION    Hyperlipidemia     Hypertension     Liver cirrhosis     MRSA infection     D/T COLON CANCER SURGERY    Status post total shoulder arthroplasty, right 2024    Wears dentures     UPPER AND LOWER     Past Surgical History:   Procedure Laterality Date    COLECTOMY PARTIAL / TOTAL Left     ,     COLONOSCOPY      NEPHRECTOMY Left     TOTAL SHOULDER ARTHROPLASTY Right 2024    Procedure: TOTAL SHOULDER ARTHROPLASTY - RIGHT LEFT SHOULDER STEROID INJECTION;  Surgeon: Jhon Fang MD;  Location:  [facility];  Service: Orthopedics;  Laterality: Right;    TOTAL SHOULDER ARTHROPLASTY W/ DISTAL CLAVICLE EXCISION Left 2024    Procedure: TOTAL SHOULDER ARTHROPLASTY LEFT;  Surgeon: Jhon Fang MD;  Location:  [facility];  Service: Orthopedics;  Laterality: Left;    URETERECTOMY Left     cancer      General Information       Row Name 01/10/25 1319          Physical  Therapy Time and Intention    Document Type discharge evaluation/summary  -KG     Mode of Treatment physical therapy  -KG       Row Name 01/10/25 1319          General Information    Patient Profile Reviewed yes  -KG     Prior Level of Function independent:;all household mobility;gait;transfer;ADL's;dressing;bathing  -KG     Existing Precautions/Restrictions no known precautions/restrictions  -KG     Barriers to Rehab medically complex  -KG       Row Name 01/10/25 1319          Living Environment    People in Home alone  -KG       Row Name 01/10/25 1319          Home Main Entrance    Number of Stairs, Main Entrance one  -KG     Stair Railings, Main Entrance none  -KG       Row Name 01/10/25 1319          Stairs Within Home, Primary    Number of Stairs, Within Home, Primary none  -KG       Row Name 01/10/25 1319          Cognition    Orientation Status (Cognition) oriented x 4  -KG               User Key  (r) = Recorded By, (t) = Taken By, (c) = Cosigned By      Initials Name Provider Type    KG Elizabeth Vázquez, PT Physical Therapist                   Mobility       Row Name 01/10/25 1319          Bed Mobility    Bed Mobility supine-sit;sit-supine  -KG     Supine-Sit [place] (Bed Mobility) modified independence  -KG     Sit-Supine [place] (Bed Mobility) modified independence  -KG     Assistive Device (Bed Mobility) head of bed elevated  -KG     Comment, (Bed Mobility) Pt demonstrated appropriate sequencing and technique.  -KG       Row Name 01/10/25 1319          Transfers    Comment, (Transfers) Pt performed STS transfer from EOB without physical assistance demonstrating good balance and safety awareness. Toilet transfer in bathroom with supervision.  -KG       Row Name 01/10/25 1319          Sit-Stand Transfer    Sit-Stand [place] (Transfers) independent  -KG       Row Name 01/10/25 1319          Gait/Stairs (Locomotion)    [place] Level (Gait) standby assist  progressed to supervision   -KG     Assistive Device (Gait) other (see comments)  UE support on IV pole  -KG     Distance in Feet (Gait) 450  -KG     Deviations/Abnormal Patterns (Gait) santiago decreased  -KG     Comment, (Gait/Stairs) Pt demonstrated step through gait pattern with slow santiago. Increased gait speed with continued forward ambulation. Pt demonstrated good balance and stability. Denied any c/o increased pain with ambulation.  -KG               User Key  (r) = Recorded By, (t) = Taken By, (c) = Cosigned By      Initials Name Provider Type    KG Elizabeth Vázquez, PT Physical Therapist                   Obj/Interventions       Row Name 01/10/25 1321          Range of Motion Comprehensive    General Range of Motion no range of motion deficits identified  -KG     Comment, General Range of Motion B LE WFL  -KG       Row Name 01/10/25 1321          Strength Comprehensive (MMT)    Comment, General Manual Muscle Testing (MMT) Assessment B LE grossly 4/5  -KG       Row Name 01/10/25 1321          Balance    Balance Assessment sitting static balance;standing static balance;standing dynamic balance  -KG     Static Sitting Balance independent  -KG     Position, Sitting Balance unsupported;sitting edge of bed  -KG     Static Standing Balance independent  -KG     Dynamic Standing Balance supervision  -KG     Position/Device Used, Standing Balance supported;unsupported  -KG       Row Name 01/10/25 1321          Sensory Assessment (Somatosensory)    Sensory Assessment (Somatosensory) LE sensation intact  -KG               User Key  (r) = Recorded By, (t) = Taken By, (c) = Cosigned By      Initials Name Provider Type    KG Elizabeth Vázquez, PT Physical Therapist                   Goals/Plan    No documentation.                  Clinical Impression       Row Name 01/10/25 1322          Pain    Additional Documentation Pain Scale: FACES Pre/Post-Treatment (Group)  -KG       Row Name 01/10/25 1322          Pain Scale: FACES  Pre/Post-Treatment    Pain: FACES Scale, Pretreatment 2-->hurts little bit  -KG     Posttreatment Pain Rating 2-->hurts little bit  -KG       Row Name 01/10/25 1322          Plan of Care Review    Plan of Care Reviewed With patient  -KG     Outcome Evaluation PT initial evaluation completed. Pt ambulated 450ft with SBA and UE support on IV pole, progressed to supervision. Pt demonstrated good balance and stability with adequate gait speed. At this time pt does not present with any deficits requiring PT skilled care. Pt is safe to continue ambulating with RN. Recommend D/C home with assistance.  -KG       Row Name 01/10/25 1322          Therapy Assessment/Plan (PT)    Patient/Family Therapy Goals Statement (PT) return to PLOF  -KG     Criteria for Skilled Interventions Met (PT) no;no problems identified which require skilled intervention  -KG     Therapy Frequency (PT) evaluation only  -KG       Row Name 01/10/25 1322          Vital Signs    Pre Systolic BP Rehab 127  -KG     Pre Treatment Diastolic BP 73  -KG     Pretreatment Heart Rate (beats/min) 90  -KG     Posttreatment Heart Rate (beats/min) 88  -KG     Pre SpO2 (%) 97  -KG     O2 Delivery Pre Treatment room air  -KG     Post SpO2 (%) 95  -KG     O2 Delivery Post Treatment room air  -KG     Pre Patient Position Supine  -KG     Intra Patient Position Standing  -KG     Post Patient Position Supine  -KG       Row Name 01/10/25 1322          Positioning and Restraints    Pre-Treatment Position in bed  -KG     Post Treatment Position bed  -KG     In Bed notified nsg;supine;call light within reach;encouraged to call for assist;exit alarm on  -KG               User Key  (r) = Recorded By, (t) = Taken By, (c) = Cosigned By      Initials Name Provider Type    KG Elizabeth Vázquez, PT Physical Therapist                   Outcome Measures       Row Name 01/10/25 1324          How much help from another person do you currently need...    Turning from your back to your  side while in flat bed without using bedrails? 4  -KG     Moving from lying on back to sitting on the side of a flat bed without bedrails? 4  -KG     Moving to and from a bed to a chair (including a wheelchair)? 4  -KG     Standing up from a chair using your arms (e.g., wheelchair, bedside chair)? 4  -KG     Climbing 3-5 steps with a railing? 3  -KG     To walk in hospital room? 3  -KG     AM-PAC 6 Clicks Score (PT) 22  -KG     Highest Level of Mobility Goal 7 --> Walk 25 feet or more  -KG       Row Name 01/10/25 1324 01/10/25 0850       Functional Assessment    Outcome Measure Options AM-PAC 6 Clicks Basic Mobility (PT)  -KG AM-PAC 6 Clicks Daily Activity (OT)  -TB              User Key  (r) = Recorded By, (t) = Taken By, (c) = Cosigned By      Initials Name Provider Type    TB Keira Loco, OT Occupational Therapist    KG Elizabeth Vázquez, PT Physical Therapist                  Physical Therapy Education       Title: PT OT SLP Therapies (In Progress)       Topic: Physical Therapy (In Progress)       Point: Mobility training (Done)       Learning Progress Summary            Patient Acceptance, E, VU,DU by KG at 1/10/2025 1007                      Point: Home exercise program (Not Started)       Learner Progress:  Not documented in this visit.              Point: Body mechanics (Done)       Learning Progress Summary            Patient Acceptance, E, VU,DU by KG at 1/10/2025 1007                      Point: Precautions (Done)       Learning Progress Summary            Patient Acceptance, E, VU,DU by KG at 1/10/2025 1007                                      User Key       Initials Effective Dates Name Provider Type Discipline    KG 05/22/20 -  Elizabeth Vázquez, PT Physical Therapist PT                  PT Recommendation and Plan     Outcome Evaluation: PT initial evaluation completed. Pt ambulated 450ft with SBA and UE support on IV pole, progressed to supervision. Pt demonstrated good balance and  stability with adequate gait speed. At this time pt does not present with any deficits requiring PT skilled care. Pt is safe to continue ambulating with RN. Recommend D/C home with assistance.     Time Calculation:   PT Evaluation Complexity  History, PT Evaluation Complexity: 1-2 personal factors and/or comorbidities  Examination of Body Systems (PT Eval Complexity): total of 3 or more elements  Clinical Presentation (PT Evaluation Complexity): stable  Clinical Decision Making (PT Evaluation Complexity): low complexity  Overall Complexity (PT Evaluation Complexity): low complexity     PT Charges       Row Name 01/10/25 1007             Time Calculation    Start Time 1007  -KG      PT Received On 01/10/25  -KG         Untimed Charges    PT Eval/Re-eval Minutes 46  -KG         Total Minutes    Untimed Charges Total Minutes 46  -KG       Total Minutes 46  -KG                User Key  (r) = Recorded By, (t) = Taken By, (c) = Cosigned By      Initials Name Provider Type    KG Elizabeth Vázquez, PT Physical Therapist                  Therapy Charges for Today       Code Description Service Date Service Provider Modifiers Qty    [id number]  PT EVAL LOW COMPLEXITY 4 1/10/2025 Elizabeth Vázquez, PT GP 1            PT G-Codes  Outcome Measure Options: AM-PAC 6 Clicks Basic Mobility (PT)  AM-PAC 6 Clicks Score (PT): 22  AM-PAC 6 Clicks Score (OT): 24    PT Discharge Summary  Anticipated Discharge Disposition (PT): home with assist  Reason for Discharge: At baseline function  Outcomes Achieved: Refer to plan of care for updates on goals achieved    Lucia Vázquez PT  1/10/2025

## 2025-01-10 NOTE — NURSING NOTE
CT guided Pelvic Mass biopsy performed by Dr Mccauley. 2 core biopsies obtained, labeled, and taken to lab by tech. Patient given 100 mcg Fentanyl for pain control. Bedrest for 1hr Supine. Patient tolerated well. Report called to Case AVINA.

## 2025-01-10 NOTE — PROGRESS NOTES
Malnutrition Severity Assessment    Patient Name:  Nolberto Jackson Jr.  YOB: 1955  MRN: [id number]  Admit Date:  1/9/2025    Patient meets criteria for : Severe Malnutrition    Comments:  Pt meets criteria for severe, acute malnutrition with the indicators of </=50% of EEN x >/=5d, significant weight loss of 5.4% x 1 month, moderate muscle wasting, and moderate subcutaneous fat loss. Of note, pt with multiple hospitalizations in the past month. Reports worsening PO tolerance over past ~2-3 months which is likely cause for muscle wasting in association with acute illness.    Malnutrition Severity Assessment  Malnutrition Type: Acute Disease or Injury - Related Malnutrition  Malnutrition Type (Last 8 Hours)       Malnutrition Severity Assessment       Row Name 01/10/25 1304       Malnutrition Severity Assessment    Malnutrition Type Acute Disease or Injury - Related Malnutrition      Row Name 01/10/25 1304       Insufficient Energy Intake     Insufficient Energy Intake Findings Severe    Insufficient Energy Intake  < or equal to 50% of est. energy requirement for > or equal to 5d)      Row Name 01/10/25 1304       Unintentional Weight Loss     Unintentional Weight Loss Findings Severe    Unintentional Weight Loss  Weight loss greater than 5% in one month  5.4% x 1month      Row Name 01/10/25 1304       Muscle Loss    Loss of Muscle Mass Findings Moderate    [place] Region Moderate - slight depression    Clavicle Bone Region Moderate - some protrusion in females, visible in males    Acromion Bone Region Moderate - acromion may slightly protrude    Scapular Bone Region Moderate - mild depression, bones may show slightly    Dorsal Hand Region Moderate - slight depression    Patellar Region Moderate - patella more prominent, less muscle definition around patella    Anterior Thigh Region Moderate - mild depression on inner thigh    Posterior Calf Region Moderate - some roundness, slight firmness      Row  Name 01/10/25 1304       Fat Loss    Subcutaneous Fat Loss Findings Moderate    Orbital Region  Moderate -  somewhat hollowness, slightly dark circles    Upper Arm Region Moderate - some fat tissue, not ample      Row Name 01/10/25 1304       Criteria Met (Must meet criteria for severity in at least 2 of these categories: M Wasting, Fat Loss, Fluid, Secondary Signs, Wt. Status, Intake)    Patient meets criteria for  Severe Malnutrition                    Electronically signed by:  Janee Soria, MS,RD,LD  01/10/25 13:14 EST

## 2025-01-10 NOTE — PLAN OF CARE
Goal Outcome Evaluation:  Plan of Care Reviewed With: patient           Outcome Evaluation: PT initial evaluation completed. Pt ambulated 450ft with SBA and UE support on IV pole, progressed to supervision. Pt demonstrated good balance and stability with adequate gait speed. At this time pt does not present with any deficits requiring PT skilled care. Pt is safe to continue ambulating with RN. Recommend D/C home with assistance.    Anticipated Discharge Disposition (PT): home with assist

## 2025-01-10 NOTE — PLAN OF CARE
Goal Outcome Evaluation:           Progress: improving  Outcome Evaluation: VSS. RA. A&Ox4. NPO since midnight for biopsy. Fluids infusing. Will continue plan of care.

## 2025-01-10 NOTE — THERAPY DISCHARGE NOTE
Acute Care - Occupational Therapy Discharge   Adrianne    Patient Name: Nolberto Jackson Jr.  : 1955    MRN: [id number]                              Today's Date: 1/10/2025       Admit Date: 2025    Visit Dx:     ICD-10-CM ICD-9-CM   1. Pelvic mass in male  R19.00 789.30   2. Sepsis, due to unspecified organism, unspecified whether acute organ dysfunction present  A41.9 038.9     995.91   3. Stage 4 chronic kidney disease  N18.4 585.4     Patient Active Problem List   Diagnosis    HTN (hypertension)    Hyperlipidemia    CKD (chronic kidney disease) stage 4, GFR 15-29 ml/min    History of colon cancer    History of kidney cancer    Bladder mass    Leukocytosis    Anemia     Past Medical History:   Diagnosis Date    Colon polyp     HX OF    Dialysis patient     NO DILAYSIS SINCE     Fistula     LEFT ARM, DOES NOT WORK    Glenohumeral arthritis, right 2024    Gout     Hard of hearing     Hearing aid worn     RIGHT EAR    History of hepatitis C     treated     History of kidney cancer 2017    LEFT KIDNEY    History of transfusion     PATIENT DENIES REACTION    Hyperlipidemia     Hypertension     Liver cirrhosis     MRSA infection     D/T COLON CANCER SURGERY    Status post total shoulder arthroplasty, right 2024    Wears dentures     UPPER AND LOWER     Past Surgical History:   Procedure Laterality Date    COLECTOMY PARTIAL / TOTAL Left     ,     COLONOSCOPY      NEPHRECTOMY Left     TOTAL SHOULDER ARTHROPLASTY Right 2024    Procedure: TOTAL SHOULDER ARTHROPLASTY - RIGHT LEFT SHOULDER STEROID INJECTION;  Surgeon: Jhon Fagn MD;  Location:  [facility];  Service: Orthopedics;  Laterality: Right;    TOTAL SHOULDER ARTHROPLASTY W/ DISTAL CLAVICLE EXCISION Left 2024    Procedure: TOTAL SHOULDER ARTHROPLASTY LEFT;  Surgeon: Jhon Fang MD;  Location:  [facility];  Service: Orthopedics;  Laterality: Left;    URETERECTOMY Left     cancer       General Information       Row Name 01/10/25 0839          OT Time and Intention    Subjective Information complains of;pain  -TB     Document Type discharge evaluation/summary  -TB     Mode of Treatment occupational therapy;individual therapy  -TB     Patient Effort good  -TB     Symptoms Noted During/After Treatment none  -TB       Row Name 01/10/25 0839          General Information    Patient Profile Reviewed yes  -TB     Prior Level of Function independent:;all household mobility;community mobility;ADL's;home management;driving;shopping  -TB     Existing Precautions/Restrictions no known precautions/restrictions  -TB     Barriers to Rehab medically complex  -TB       Row Name 01/10/25 0839          Occupational Profile    Environmental Supports and Barriers (Occupational Profile) Pt is  x6 years. Lives alone in a single story home with 1 step to enter. Walk-in shower. Comfort ht commodes. No AD or falls prior. Independent with all BADLs/IADLs.  -TB       Row Name 01/10/25 0839          Living Environment    People in Home alone  -TB       Row Name 01/10/25 0839          Home Main Entrance    Number of Stairs, Main Entrance one  -TB       Row Name 01/10/25 0839          Stairs Within Home, Primary    Number of Stairs, Within Home, Primary none  -TB       Row Name 01/10/25 0839          Cognition    Orientation Status (Cognition) oriented x 4  -TB       Row Name 01/10/25 0839          Safety Issues/Impairments Affecting Functional Mobility    Impairments Affecting Function (Mobility) pain  -TB     Comment, Safety Issues/Impairments (Mobility) Pt is up in room, bathroom, and short distance in hallway with Supervision/[place]. No dizziness or LOB.  -TB               User Key  (r) = Recorded By, (t) = Taken By, (c) = Cosigned By      Initials Name Provider Type    TB Keira Loco OT Occupational Therapist                   Mobility/ADL's       Row Name 01/10/25 0843          Bed Mobility    Bed  Mobility bed mobility (all) activities  -     All Activities, [place] (Bed Mobility) independent  -TB       Row Name 01/10/25 0843          Transfers    Transfers sit-stand transfer;toilet transfer  -TB       Row Name 01/10/25 0843          Sit-Stand Transfer    Sit-Stand [place] (Transfers) independent  -TB       Row Name 01/10/25 0843          Toilet Transfer    [place] Level (Toilet Transfer) independent  -     Assistive Device (Toilet Transfer) raised toilet seat  -       Row Name 01/10/25 0843          Functional Mobility    Functional Mobility- Ind. Level independent;supervision required  -     Functional Mobility- Device --  None  -TB     Functional Mobility-Distance (Feet) 100  -     Functional Mobility- Comment Pt is up in room, bathroom, and short distance in hallway with Supervision/[place]. No dizziness or LOB.  -TB     Patient was able to Ambulate yes  -       Row Name 01/10/25 0843          Activities of Daily Living    BADL Assessment/Intervention lower body dressing;toileting  -       Row Name 01/10/25 0843          Lower Body Dressing Assessment/Training    [place] Level (Lower Body Dressing) don;socks;independent  -TB       Row Name 01/10/25 0843          Toileting Assessment/Training    [place] Level (Toileting) toileting skills;independent  -               User Key  (r) = Recorded By, (t) = Taken By, (c) = Cosigned By      Initials Name Provider Type     Keira Loco, OT Occupational Therapist                   Obj/Interventions       Row Name 01/10/25 0845          Sensory Assessment (Somatosensory)    Sensory Assessment (Somatosensory) UE sensation intact  -       Row Name 01/10/25 0845          Vision Assessment/Intervention    Visual Impairment/Limitations WFL  -       Row Name 01/10/25 0845          Range of Motion Comprehensive    General Range of Motion bilateral upper extremity ROM WFL  -     Comment, General Range of Motion  BUE AROM intact for self-care  -TB       Row Name 01/10/25 0845          Strength Comprehensive (MMT)    Comment, General Manual Muscle Testing (MMT) Assessment Generalized weakness. BUE 4+/5  -TB       Row Name 01/10/25 0845          Motor Skills    Motor Skills functional endurance  -TB     Functional Endurance Fair  -TB       Row Name 01/10/25 0845          Balance    Balance Assessment sitting dynamic balance;sit to stand dynamic balance;standing dynamic balance  -TB     Dynamic Sitting Balance independent  -TB     Position, Sitting Balance unsupported  -TB     Sit to Stand Dynamic Balance independent  -TB     Dynamic Standing Balance independent  -TB     Position/Device Used, Standing Balance unsupported  -TB     Balance Interventions sitting;standing;sit to stand;dynamic;dynamic reaching;occupation based/functional task;UE activity with balance activity  -TB     Comment, Balance No LOB  -TB               User Key  (r) = Recorded By, (t) = Taken By, (c) = Cosigned By      Initials Name Provider Type    TB Keira Loco, OT Occupational Therapist                   Goals/Plan    No documentation.                  Clinical Impression       Row Name 01/10/25 0847          Pain Assessment    Pretreatment Pain Rating 4/10  -TB     Posttreatment Pain Rating 4/10  -TB     Pain Location abdomen  -TB     Pain Side/Orientation lower  -TB     Response to Pain Interventions activity participation with tolerable pain  -TB       Row Name 01/10/25 0847          Plan of Care Review    Plan of Care Reviewed With patient  -TB     Progress --  IE  -TB     Outcome Evaluation OT IE completed. Pt is A/Ox4 and motivated to work with therapy. BUE AROM, strength, and sensation are intact. Pt is up in room, bathroom, and short distance in hallway with Supervision/[place]. No dizziness or LOB. Pt is at baseline [place] with ADLs. OT will d/c at this time. No AE or DME needs for d/c. Recommend d/c to home when pt is  medically ready.  -TB       Row Name 01/10/25 0847          Therapy Assessment/Plan (OT)    Therapy Frequency (OT) evaluation only  -TB       Row Name 01/10/25 0847          Therapy Plan Review/Discharge Plan (OT)    Anticipated Discharge Disposition (OT) home  -TB       Row Name 01/10/25 0847          Vital Signs    Pre Systolic BP Rehab --  RN cleared OT  -TB     Pre SpO2 (%) 99  -TB     O2 Delivery Pre Treatment room air  -TB     Post SpO2 (%) 100  -TB     O2 Delivery Post Treatment room air  -TB     Pre Patient Position Supine  -TB     Intra Patient Position Standing  -TB     Post Patient Position Supine  -TB       Row Name 01/10/25 0847          Positioning and Restraints    Pre-Treatment Position in bed  -TB     Post Treatment Position bed  -TB     In Bed notified nsg;supine;call light within reach;encouraged to call for assist;exit alarm on  -TB               User Key  (r) = Recorded By, (t) = Taken By, (c) = Cosigned By      Initials Name Provider Type    Keira Botello OT Occupational Therapist                   Outcome Measures       Row Name 01/10/25 0850          How much help from another is currently needed...    Putting on and taking off regular lower body clothing? 4  -TB     Bathing (including washing, rinsing, and drying) 4  -TB     Toileting (which includes using toilet bed pan or urinal) 4  -TB     Putting on and taking off regular upper body clothing 4  -TB     Taking care of personal grooming (such as brushing teeth) 4  -TB     Eating meals 4  -TB     AM-PAC 6 Clicks Score (OT) 24  -TB       Row Name 01/10/25 0850          Functional Assessment    Outcome Measure Options AM-PAC 6 Clicks Daily Activity (OT)  -TB               User Key  (r) = Recorded By, (t) = Taken By, (c) = Cosigned By      Initials Name Provider Type    Keira Botello OT Occupational Therapist                  Occupational Therapy Education       Title: PT OT SLP Therapies (In Progress)       Topic:  Occupational Therapy (In Progress)       Point: ADL training (Done)       Description:   Instruct learner(s) on proper safety adaptation and remediation techniques during self care or transfers.   Instruct in proper use of assistive devices.                  Learning Progress Summary            Patient Acceptance, E,D, DYLON,DU by TB at 1/10/2025 0851                      Point: Home exercise program (Not Started)       Description:   Instruct learner(s) on appropriate technique for monitoring, assisting and/or progressing therapeutic exercises/activities.                  Learner Progress:  Not documented in this visit.              Point: Precautions (Not Started)       Description:   Instruct learner(s) on prescribed precautions during self-care and functional transfers.                  Learner Progress:  Not documented in this visit.              Point: Body mechanics (Not Started)       Description:   Instruct learner(s) on proper positioning and spine alignment during self-care, functional mobility activities and/or exercises.                  Learner Progress:  Not documented in this visit.                              User Key       Initials Effective Dates Name Provider Type Discipline     07/11/23 -  Keira Loco OT Occupational Therapist OT                  OT Recommendation and Plan  Therapy Frequency (OT): evaluation only  Plan of Care Review  Plan of Care Reviewed With: patient  Progress:  (IE)  Outcome Evaluation: OT IE completed. Pt is A/Ox4 and motivated to work with therapy. BUE AROM, strength, and sensation are intact. Pt is up in room, bathroom, and short distance in hallway with Supervision/[place]. No dizziness or LOB. Pt is at baseline [place] with ADLs. OT will d/c at this time. No AE or DME needs for d/c. Recommend d/c to home when pt is medically ready.  Plan of Care Reviewed With: patient  Outcome Evaluation: OT IE completed. Pt is A/Ox4 and motivated to work with  therapy. BUE AROM, strength, and sensation are intact. Pt is up in room, bathroom, and short distance in hallway with Supervision/[place]. No dizziness or LOB. Pt is at baseline [place] with ADLs. OT will d/c at this time. No AE or DME needs for d/c. Recommend d/c to home when pt is medically ready.     Time Calculation:   Evaluation Complexity (OT)  Review Occupational Profile/Medical/Therapy History Complexity: expanded/moderate complexity  Assessment, Occupational Performance/Identification of Deficit Complexity: 3-5 performance deficits  Clinical Decision Making Complexity (OT): detailed assessment/moderate complexity  Overall Complexity of Evaluation (OT): moderate complexity     Time Calculation- OT       Row Name 01/10/25 0753             Time Calculation- OT    OT Start Time 0753  -TB      OT Received On 01/10/25  -TB         Untimed Charges    OT Eval/Re-eval Minutes 58  -TB         Total Minutes    Untimed Charges Total Minutes 58  -TB       Total Minutes 58  -TB                User Key  (r) = Recorded By, (t) = Taken By, (c) = Cosigned By      Initials Name Provider Type    TB Keira Loco OT Occupational Therapist                  Therapy Charges for Today       Code Description Service Date Service Provider Modifiers Qty    [id number]  OT EVAL MOD COMPLEXITY 4 1/10/2025 Keira Loco OT GO 1               OT Discharge Summary  Anticipated Discharge Disposition (OT): home    Keira Loco OT  1/10/2025

## 2025-01-10 NOTE — PROGRESS NOTES
"          Clinical Nutrition Assessment     Patient Name: Nolberto Jackson Jr.  YOB: 1955  MRN: [id number]  Date of Encounter: 01/10/25 13:03 EST  Admission date: 1/9/2025  Reason for Visit: MST score 2+, Unintentional weight loss    Assessment   Nutrition Assessment   Admission Diagnosis:  Bladder mass [N32.89]    Problem List:    Bladder mass    HTN (hypertension)    CKD (chronic kidney disease) stage 4, GFR 15-29 ml/min    History of colon cancer    History of kidney cancer    Leukocytosis    Anemia      PMH:   He  has a past medical history of Colon polyp, Dialysis patient, Fistula, Glenohumeral arthritis, right (02/01/2024), Gout, Hard of hearing, Hearing aid worn, History of hepatitis C, History of kidney cancer (2017), History of transfusion, Hyperlipidemia, Hypertension, Liver cirrhosis, MRSA infection (2005), Status post total shoulder arthroplasty, right (02/01/2024), and Wears dentures.    PSH:  He  has a past surgical history that includes Colonoscopy; Colectomy partial / total (Left); Nephrectomy (Left); Ureterectomy (Left, 2007); Total shoulder arthroplasty (Right, 2/1/2024); and Total shoulder arthroplasty w/ distal clavicle excision (Left, 8/29/2024).    Applicable Nutrition History:   Edentulous  Upper/lower - well fitting    Recent hospitalization (12/15/24-12/17/24)  pSBO + pancreatitis    Anthropometrics     Height: Height: 170.2 cm (67\")  Last Filed Weight: Weight: 79.1 kg (174 lb 6.1 oz) (01/09/25 1830)  Method:  ?  BMI: BMI (Calculated): 27.3    UBW:  12/5/24: 184# outside provider office   Weight      Weight (kg) Weight (lbs) Weight Method   1/18/2024 91.55 kg  201 lb 13.3 oz  Standing scale    2/1/2024 91.173 kg  201 lb  Stated    2/4/2024 90.719 kg  200 lb  Stated    8/22/2024 88.65 kg  195 lb 7 oz  Standing scale    8/29/2024 86.183 kg  190 lb  Standing scale;Stated    12/14/2024 79.379 kg  175 lb     12/15/2024 80.74 kg  178 lb  Bed scale    1/9/2025 79.379 kg  175 lb      " "79.1 kg  174 lb 6.1 oz       Weight change: weight loss of 10 lbs (5.4%) over 1 month(s)    Significant?  Yes    Nutrition Focused Physical Exam    Date:  1/10       Patient meets criteria for malnutrition diagnosis, see MSA note.     Subjective   Reported/Observed/Food/Nutrition Related History:     Pt up in bed at time of visit, able to provide weight/nutrition hx. Endorsed anorexia for past 2-3months - reports breakfast being best meal of the day. Snacks on peanut butter and crackers. Denies dysphagia however reports inability to eat \"heavy\" foods, states chewing and chewing without being able to swallow food bolus. Hx of constipation. Noted to have shellfish allergy - denies. Dislikes ONS - agreeable to try prosource/lemonade mixture once diet advanced.      Current Nutrition Prescription   PO: NPO Diet NPO Type: Strict NPO  Oral Nutrition Supplement: N/A  Intake: N/A    Assessment & Plan   Nutrition Diagnosis   Date: 1/10  Updated:  Problem Malnutrition, acute severe   Etiology Energy in < energy out   Signs/Symptoms </=50% of EEN x >/=5d, significant weight loss of 5.4% x 1 month, moderate muscle wasting, and moderate subcutaneous fat loss   Status: New    Date:  1/10            Updated:    Problem Predicted inadequate nutrient intake    Etiology ? Necrotic pelvic mass   Signs/Symptoms NPO   Status: New    Goal:   Nutrition to support treatment and Advance diet as medically feasible/appropriate      Nutrition Intervention      Follow treatment progress, Care plan reviewed, Await begin PO    Advance diet as clinically indicated/tolerated  Will provide prosource + Lemonade once PO appropriate    Monitoring/Evaluation:   Per protocol, I&O, Pertinent labs, GI status, Symptoms, POC/GOC    Janee Soria MS,RD,LD  Time Spent: 25min  "

## 2025-01-10 NOTE — PROGRESS NOTES
Patient feels well    CT-guided biopsy earlier today without immediate complication    Leukocytosis improved        Okay from our standpoint for discharge if okay with primary team so that we can follow-up pathology and further recommendations as an outpatient.

## 2025-01-10 NOTE — PLAN OF CARE
Problem: Adult Inpatient Plan of Care  Goal: Plan of Care Review  Recent Flowsheet Documentation  Taken 1/10/2025 0847 by Keira Loco OT  Progress: (IE) --  Outcome Evaluation: OT IE completed. Pt is A/Ox4 and motivated to work with therapy. BUE AROM, strength, and sensation are intact. Pt is up in room, bathroom, and short distance in hallway with Supervision/[place]. No dizziness or LOB. Pt is at baseline [place] with ADLs. OT will d/c at this time. No AE or DME needs. Recommend d/c to home when pt is medically ready.

## 2025-01-11 VITALS
WEIGHT: 174.38 LBS | TEMPERATURE: 97.8 F | OXYGEN SATURATION: 98 % | DIASTOLIC BLOOD PRESSURE: 63 MMHG | SYSTOLIC BLOOD PRESSURE: 125 MMHG | BODY MASS INDEX: 27.37 KG/M2 | HEIGHT: 67 IN | RESPIRATION RATE: 18 BRPM | HEART RATE: 89 BPM

## 2025-01-11 LAB
ANION GAP SERPL CALCULATED.3IONS-SCNC: 11 MMOL/L (ref 5–15)
BACTERIA SPEC AEROBE CULT: NO GROWTH
BUN SERPL-MCNC: 40 MG/DL (ref 8–23)
BUN/CREAT SERPL: 12.2 (ref 7–25)
CALCIUM SPEC-SCNC: 10.2 MG/DL (ref 8.6–10.5)
CHLORIDE SERPL-SCNC: 105 MMOL/L (ref 98–107)
CO2 SERPL-SCNC: 23 MMOL/L (ref 22–29)
CREAT SERPL-MCNC: 3.28 MG/DL (ref 0.76–1.27)
DEPRECATED RDW RBC AUTO: 45.6 FL (ref 37–54)
EGFRCR SERPLBLD CKD-EPI 2021: 19.8 ML/MIN/1.73
ERYTHROCYTE [DISTWIDTH] IN BLOOD BY AUTOMATED COUNT: 13.2 % (ref 12.3–15.4)
GLUCOSE SERPL-MCNC: 122 MG/DL (ref 65–99)
HCT VFR BLD AUTO: 28.8 % (ref 37.5–51)
HGB BLD-MCNC: 8.8 G/DL (ref 13–17.7)
MCH RBC QN AUTO: 28.9 PG (ref 26.6–33)
MCHC RBC AUTO-ENTMCNC: 30.6 G/DL (ref 31.5–35.7)
MCV RBC AUTO: 94.4 FL (ref 79–97)
PLATELET # BLD AUTO: 138 10*3/MM3 (ref 140–450)
PMV BLD AUTO: 10.1 FL (ref 6–12)
POTASSIUM SERPL-SCNC: 4.8 MMOL/L (ref 3.5–5.2)
RBC # BLD AUTO: 3.05 10*6/MM3 (ref 4.14–5.8)
SODIUM SERPL-SCNC: 139 MMOL/L (ref 136–145)
WBC NRBC COR # BLD AUTO: 17.42 10*3/MM3 (ref 3.4–10.8)

## 2025-01-11 PROCEDURE — 99239 HOSP IP/OBS DSCHRG MGMT >30: CPT | Performed by: INTERNAL MEDICINE

## 2025-01-11 PROCEDURE — G0378 HOSPITAL OBSERVATION PER HR: HCPCS

## 2025-01-11 PROCEDURE — 25010000002 CEFTRIAXONE PER 250 MG: Performed by: INTERNAL MEDICINE

## 2025-01-11 PROCEDURE — 85027 COMPLETE CBC AUTOMATED: CPT | Performed by: INTERNAL MEDICINE

## 2025-01-11 PROCEDURE — 80048 BASIC METABOLIC PNL TOTAL CA: CPT | Performed by: INTERNAL MEDICINE

## 2025-01-11 RX ORDER — CEFDINIR 300 MG/1
300 CAPSULE ORAL DAILY
Qty: 5 CAPSULE | Refills: 0 | Status: SHIPPED | OUTPATIENT
Start: 2025-01-11 | End: 2025-01-16

## 2025-01-11 RX ORDER — OXYCODONE HYDROCHLORIDE 5 MG/1
5 TABLET ORAL EVERY 8 HOURS PRN
Qty: 6 TABLET | Refills: 0 | Status: SHIPPED | OUTPATIENT
Start: 2025-01-11

## 2025-01-11 RX ADMIN — OXYCODONE 5 MG: 5 TABLET ORAL at 06:19

## 2025-01-11 RX ADMIN — SENNOSIDES AND DOCUSATE SODIUM 2 TABLET: 50; 8.6 TABLET ORAL at 08:40

## 2025-01-11 RX ADMIN — ALLOPURINOL 100 MG: 100 TABLET ORAL at 08:40

## 2025-01-11 NOTE — PLAN OF CARE
Goal Outcome Evaluation:  Plan of Care Reviewed With: patient        Progress: improving  Outcome Evaluation: VSS on RA. A&O x4. PRNs given for abdominal pain. Biopsy performed today. Will continue plan of care.

## 2025-01-11 NOTE — DISCHARGE SUMMARY
[facility]  DISCHARGE SUMMARY    Patient Name: Nolbetro Jackson Jr.  : 1955  MRN: [id number]    Date of Admission: 2025  1:05 PM  Date of Discharge:  2025   Primary Care Physician: Tammy Myers MD    Consults       Date and Time Order Name Status Description    2025  6:30 PM Inpatient Urology Consult      2025  4:35 PM Inpatient Urology Consult Completed     12/15/2024  9:14 AM Inpatient Gastroenterology Consult Completed             Hospital Course     Presenting Problem: lower abdominal pain, found to have bladder mass    Active Hospital Problems    Diagnosis  POA    **Bladder mass [N32.89]  Yes    Severe protein-calorie malnutrition [E43]  Yes    Leukocytosis [D72.829]  Yes    Anemia [D64.9]  Yes    CKD (chronic kidney disease) stage 4, GFR 15-29 ml/min [N18.4]  Yes    History of colon cancer [Z85.038]  Yes    History of kidney cancer [Z85.528]  Not Applicable    HTN (hypertension) [I10]  Yes      Resolved Hospital Problems   No resolved problems to display.          Hospital Course:  Nolberto Jackson Jr. is a 69 y.o. 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   Here 12/15- and attributed to partial SBO but persisted thereafter    CT abdomen/pelvis w contrast revealed mass arising from posterior bladder (on review of scans in November/December with radiology, present then as well though not as clear without contrast and likely attributed to prostatomegaly). This appears to be etiology of lower abdomen pain. CT guided biopsy on 1/10 with IR. Dr Olivia will follow-up biopsy results and see patient in office.   No significant pain med needs, sent tabs #6 with patient given persistence of pain. But eating some, drinking well    Was observed s/p contrast and Cr remained fine with post-hydration. D/w him  repeat labs with PCP this week  Dysuria which resolved with abx treatment. Despite urine cultures negative, given mass, likely upcoming urologic procedure, bladder mass + leukocytosis, will trx additional 5 days w cefdinir. Instructed to return if any worsening infectious s/s - repeat CBC w PCP as well this week      Discharge Follow Up Recommendations for outpatient labs/diagnostics:   Repeat labs w PCP this week   Dr Olivia followup pending biopsy results    Day of Discharge     HPI:   Feels well  Eating some, drinking well  Anxious for home      Vital Signs:   Temp:  [98 °F (36.7 °C)-98.8 °F (37.1 °C)] 98 °F (36.7 °C)  Heart Rate:  [] 81  Resp:  [17-18] 18  BP: ()/(60-81) 114/60      Physical Exam:  Constitutional: No acute distress, awake, alert  HENT: NCAT, mucous membranes moist  Respiratory: Clear to auscultation bilaterally, respiratory effort normal   Cardiovascular: RRR, no murmurs, rubs, or gallops  Gastrointestinal: midline surgical scar with adjacent hernia, no tenderness or distension  Musculoskeletal: Muscle tone within normal limits, no joint effusions appreciated  Psychiatric: Appropriate affect, cooperative  Neurologic: Alert and oriented, facial movements symmetric and spontaneous movement of all 4 extremities grossly equal bilaterally, speech clear  Skin: No rashes    Pertinent  and/or Most Recent Results     LAB RESULTS:      Lab 01/11/25  0937 01/10/25  1124 01/09/25  1338   WBC 17.42* 16.13* 19.09*   HEMOGLOBIN 8.8* 8.5* 9.2*   HEMATOCRIT 28.8* 27.5* 29.5*   PLATELETS 138* 130* 151   NEUTROS ABS  --  14.23* 16.55*   IMMATURE GRANS (ABS)  --  0.14* 0.36*   LYMPHS ABS  --  0.58* 0.70   MONOS ABS  --  0.80 1.08*   EOS ABS  --  0.34 0.36   MCV 94.4 93.5 91.3   PROCALCITONIN  --   --  0.41*   LACTATE  --   --  1.9   PROTIME  --   --  16.4*   APTT  --   --  33.1*         Lab 01/11/25  0937 01/10/25  1124 01/09/25  1422 01/09/25  1338   SODIUM 139 136  --  135*   POTASSIUM 4.8 4.6  --   4.3   CHLORIDE 105 107  --  100   CO2 23.0 18.0*  --  20.0*   ANION GAP 11.0 11.0  --  15.0   BUN 40* 37*  --  45*   CREATININE 3.28* 3.29* 3.90* 3.42*   EGFR 19.8* 19.8*  --  18.9*   GLUCOSE 122* 95  --  102*   CALCIUM 10.2 9.5  --  10.2         Lab 01/10/25  1124 01/09/25  1338   TOTAL PROTEIN 6.1 6.5   ALBUMIN 3.0* 3.6   GLOBULIN 3.1 2.9   ALT (SGPT) 16 20   AST (SGOT) 20 19   BILIRUBIN 0.4 0.7   ALK PHOS 173* 151*   LIPASE  --  34         Lab 01/09/25  1422 01/09/25  1338   PROBNP  --  1,017.0*   HSTROP T 26*  --    PROTIME  --  16.4*   INR  --  1.31*             Lab 01/09/25  1422   IRON 14*   IRON SATURATION (TSAT) 10*   TIBC 146*   TRANSFERRIN 98*   FERRITIN 620.90*         Brief Urine Lab Results  (Last result in the past 365 days)        Color   Clarity   Blood   Leuk Est   Nitrite   Protein   CREAT   Urine HCG        01/09/25 1406 Yellow   Cloudy   Large (3+)   Small (1+)   Negative   100 mg/dL (2+)                 Microbiology Results (last 10 days)       Procedure Component Value - Date/Time    Urine Culture - Urine, Urine, Clean Catch [[id number]]  (Normal) Collected: 01/09/25 1406    Lab Status: Final result Specimen: Urine, Clean Catch Updated: 01/11/25 0151     Urine Culture No growth    Blood Culture - Blood, Wrist, Right [[id number]]  (Normal) Collected: 01/09/25 1338    Lab Status: Preliminary result Specimen: Blood from Wrist, Right Updated: 01/10/25 1400     Blood Culture No growth at 24 hours    Narrative:      Less than seven (7) mL's of blood was collected.  Insufficient quantity may yield false negative results.    Blood Culture - Blood, Arm, Right [[id number]]  (Normal) Collected: 01/09/25 1338    Lab Status: Preliminary result Specimen: Blood from Arm, Right Updated: 01/10/25 1400     Blood Culture No growth at 24 hours    Narrative:      Less than seven (7) mL's of blood was collected.  Insufficient quantity may yield false negative results.            CT Abdomen Pelvis With  Contrast    Result Date: 1/9/2025  CT CHEST W CONTRAST DIAGNOSTIC, CT ABDOMEN PELVIS W CONTRAST Date of Exam: 1/9/2025 2:36 PM EST Indication: Dyspnea, chest pain. Comparison: None available. Technique: Axial CT images were obtained of the chest, abdomen and pelvis after the uneventful intravenous administration of 80 mL Isovue-370.  Reconstructed coronal and sagittal images were also obtained. Automated exposure control and iterative construction methods were used. Findings: CT chest: There is no pathologic axillary adenopathy or other worrisome body wall soft tissue finding in the chest. There is no pleural or pericardial effusion. There is no distinct pathologic mediastinal or hilar lymphadenopathy. Nonaneurysmal thoracic aorta. The central pulmonary arteries are patent. The lung fields demonstrate some mild emphysema and lower lung volume loss. There is no evidence of acute infectious process or distinct suspicious focal pulmonary nodularity. The osseous structures demonstrate no evidence of acute fracture or aggressive osseous lesion. CT abdomen pelvis: The body wall soft tissues demonstrate no acute or suspicious findings. The osseous structures demonstrate no evidence of acute fracture or aggressive osseous lesion, with multilevel spondylosis present. The liver demonstrates homogeneous enhancement. There is mild intrahepatic and extrahepatic biliary ductal prominence without obstructing stone or mass visualized. The spleen is moderately enlarged measuring 16 cm craniocaudal. The pancreas demonstrates homogeneous enhancement  without acute inflammatory change. The right kidney demonstrates no acute or suspicious findings with some atrophy and simple appearing cysts noted. Small and large bowel loops are nondilated. There is no suspicious focal bowel wall thickening. There is  no free fluid or pneumoperitoneum. Within the pelvis there is a large multinodular mass present, with areas of central fluid density  either cystic or reflecting necrosis measuring approximately 10 x 7 x 9 cm, concerning for either bladder mass or primary prostate neoplasm. There is no evidence of ureteral obstruction on the right. There is no pathologic retroperitoneal adenopathy. Mildly atherosclerotic, nonaneurysmal abdominal aorta.     Impression: Nonspecific intra and extrapelvic biliary ductal prominence without obstructing stone or mass present. Correlate with any clinical concern for biliary obstruction. 10 x 7 x 9 cm multinodular centrally cystic or necrotic pelvic mass either arising from the bladder or prostate concerning for possible neoplasm. Consider urology consultation. No acute findings in the chest. Electronically Signed: Tio Vogt MD  1/9/2025 3:22 PM EST  Workstation ID: [id number]    CT Chest With Contrast Diagnostic    Result Date: 1/9/2025  CT CHEST W CONTRAST DIAGNOSTIC, CT ABDOMEN PELVIS W CONTRAST Date of Exam: 1/9/2025 2:36 PM EST Indication: Dyspnea, chest pain. Comparison: None available. Technique: Axial CT images were obtained of the chest, abdomen and pelvis after the uneventful intravenous administration of 80 mL Isovue-370.  Reconstructed coronal and sagittal images were also obtained. Automated exposure control and iterative construction methods were used. Findings: CT chest: There is no pathologic axillary adenopathy or other worrisome body wall soft tissue finding in the chest. There is no pleural or pericardial effusion. There is no distinct pathologic mediastinal or hilar lymphadenopathy. Nonaneurysmal thoracic aorta. The central pulmonary arteries are patent. The lung fields demonstrate some mild emphysema and lower lung volume loss. There is no evidence of acute infectious process or distinct suspicious focal pulmonary nodularity. The osseous structures demonstrate no evidence of acute fracture or aggressive osseous lesion. CT abdomen pelvis: The body wall soft tissues demonstrate no acute or suspicious  findings. The osseous structures demonstrate no evidence of acute fracture or aggressive osseous lesion, with multilevel spondylosis present. The liver demonstrates homogeneous enhancement. There is mild intrahepatic and extrahepatic biliary ductal prominence without obstructing stone or mass visualized. The spleen is moderately enlarged measuring 16 cm craniocaudal. The pancreas demonstrates homogeneous enhancement  without acute inflammatory change. The right kidney demonstrates no acute or suspicious findings with some atrophy and simple appearing cysts noted. Small and large bowel loops are nondilated. There is no suspicious focal bowel wall thickening. There is  no free fluid or pneumoperitoneum. Within the pelvis there is a large multinodular mass present, with areas of central fluid density either cystic or reflecting necrosis measuring approximately 10 x 7 x 9 cm, concerning for either bladder mass or primary prostate neoplasm. There is no evidence of ureteral obstruction on the right. There is no pathologic retroperitoneal adenopathy. Mildly atherosclerotic, nonaneurysmal abdominal aorta.     Impression: Nonspecific intra and extrapelvic biliary ductal prominence without obstructing stone or mass present. Correlate with any clinical concern for biliary obstruction. 10 x 7 x 9 cm multinodular centrally cystic or necrotic pelvic mass either arising from the bladder or prostate concerning for possible neoplasm. Consider urology consultation. No acute findings in the chest. Electronically Signed: Tio Vogt MD  1/9/2025 3:22 PM EST  Workstation ID: [id number]                 Plan for Follow-up of Pending Labs/Results:   Pending Labs       Order Current Status    Tissue Pathology Exam Collected (01/10/25 8709)    Blood Culture - Blood, Arm, Right Preliminary result    Blood Culture - Blood, Wrist, Right Preliminary result          Discharge Details        Discharge Medications        New Medications         Instructions Start Date   cefdinir 300 MG capsule  Commonly known as: OMNICEF   300 mg, Oral, Daily             Changes to Medications        Instructions Start Date   oxyCODONE 5 MG immediate release tablet  Commonly known as: ROXICODONE  What changed: when to take this   5 mg, Oral, Every 8 Hours PRN             Continue These Medications        Instructions Start Date   hydrocortisone 25 MG suppository  Commonly known as: ANUSOL-HC   25 mg, Rectal, 2 Times Daily PRN      ondansetron 4 MG tablet  Commonly known as: ZOFRAN   Take 1 tablet by mouth Every 8 (Eight) Hours As Needed for post-op nausea.      pravastatin 40 MG tablet  Commonly known as: PRAVACHOL   1 tablet, Daily               Allergies   Allergen Reactions    Shellfish Allergy Unknown - Low Severity    Diphenhydramine Hcl Delirium    Midazolam Other (See Comments)     Other reaction(s): N+V - Nausea and vomiting, N+V - Nausea and vomiting    Zolpidem Delirium     Other reaction(s): nightmares, nightmares         Discharge Disposition:  Home or Self Care    Diet:  Hospital:  Diet Order   Procedures    Diet: Regular/House; Fluid Consistency: Thin (IDDSI 0)            Activity:      Restrictions or Other Recommendations:  Return if fever worsening infectious sx  No driving on oxy d/w patient       CODE STATUS:    Code Status and Medical Interventions: CPR (Attempt to Resuscitate); Full Support   Ordered at: 01/09/25 3136     Level Of Support Discussed With:    Patient     Code Status (Patient has no pulse and is not breathing):    CPR (Attempt to Resuscitate)     Medical Interventions (Patient has pulse or is breathing):    Full Support       Future Appointments   Date Time Provider Department Center   2/26/2025  9:00 AM [facility]  [facility] Contreras       Additional Instructions for the Follow-ups that You Need to Schedule       Discharge Follow-up with PCP   As directed       Currently Documented PCP:    Tammy Myers MD    PCP Phone  Number:    [phone]     Follow Up Details: repeat CBC and BMP within next 5 days        Discharge Follow-up with Specified Provider: Dr Olivia -  clinic to schedule when biopsy resulted   As directed      To: Dr Olivia -  clinic to schedule when biopsy resulted                      Christa Danielson MD  01/11/25      Time Spent on Discharge:  I spent  35  minutes on this discharge activity which included: face-to-face encounter with the patient, reviewing the data in the system, coordination of the care with the nursing staff as well as consultants, documentation, and entering orders.

## 2025-01-11 NOTE — PROGRESS NOTES
[facility] Medicine Services  PROGRESS NOTE    Patient Name: Nolberto Jackson Jr.  : 1955  MRN: [id number]    Date of Admission: 2025  Primary Care Physician: Tammy Myers MD    Subjective   Subjective     CC: lower abdomen pain    HPI:  Not using PRN pain meds  Relieved to have found out what it is - this makes it easier to cope with  Drinking well, eating some but not much  Agreeable to watching renal function post contrast and hopeful early AM dc tmrw      Objective   Objective     Vital Signs:   Temp:  [98.1 °F (36.7 °C)-99.3 °F (37.4 °C)] 98.2 °F (36.8 °C)  Heart Rate:  [] 93  Resp:  [14-18] 18  BP: ()/(55-77) 120/76     Physical Exam:  Constitutional: No acute distress, awake, alert  Respiratory: Clear to auscultation bilaterally, respiratory effort normal   Cardiovascular: RRR, no murmurs, rubs, or gallops  Gastrointestinal: Soft, nontender, nondistended  Musculoskeletal: Muscle tone within normal limits, no joint effusions appreciated  Psychiatric: Appropriate affect, cooperative  Neurologic: Alert and oriented, facial movements symmetric and spontaneous movement of all 4 extremities grossly equal bilaterally, speech clear  Skin: No rashes    Results Reviewed:  LAB RESULTS:      Lab 01/10/25  11225  14225  1338   WBC 16.13*  --  19.09*   HEMOGLOBIN 8.5*  --  9.2*   HEMATOCRIT 27.5*  --  29.5*   PLATELETS 130*  --  151   NEUTROS ABS 14.23*  --  16.55*   IMMATURE GRANS (ABS) 0.14*  --  0.36*   LYMPHS ABS 0.58*  --  0.70   MONOS ABS 0.80  --  1.08*   EOS ABS 0.34  --  0.36   MCV 93.5  --  91.3   PROCALCITONIN  --   --  0.41*   LACTATE  --   --  1.9   PROTIME  --   --  16.4*   APTT  --   --  33.1*   HSTROP T  --  26*  --          Lab 01/10/25  11225  14225  1338   SODIUM 136  --  135*   POTASSIUM 4.6  --  4.3   CHLORIDE 107  --  100   CO2 18.0*  --  20.0*   ANION GAP 11.0  --  15.0   BUN 37*  --  45*   CREATININE 3.29*  3.90* 3.42*   EGFR 19.8*  --  18.9*   GLUCOSE 95  --  102*   CALCIUM 9.5  --  10.2         Lab 01/10/25  1124 01/09/25  1338   TOTAL PROTEIN 6.1 6.5   ALBUMIN 3.0* 3.6   GLOBULIN 3.1 2.9   ALT (SGPT) 16 20   AST (SGOT) 20 19   BILIRUBIN 0.4 0.7   ALK PHOS 173* 151*   LIPASE  --  34         Lab 01/09/25  1422 01/09/25  1338   PROBNP  --  1,017.0*   HSTROP T 26*  --    PROTIME  --  16.4*   INR  --  1.31*             Lab 01/09/25  1422   IRON 14*   IRON SATURATION (TSAT) 10*   TIBC 146*   TRANSFERRIN 98*   FERRITIN 620.90*         Brief Urine Lab Results  (Last result in the past 365 days)        Color   Clarity   Blood   Leuk Est   Nitrite   Protein   CREAT   Urine HCG        01/09/25 1406 Yellow   Cloudy   Large (3+)   Small (1+)   Negative   100 mg/dL (2+)                   Microbiology Results Abnormal       None            CT Abdomen Pelvis With Contrast    Result Date: 1/9/2025  CT CHEST W CONTRAST DIAGNOSTIC, CT ABDOMEN PELVIS W CONTRAST Date of Exam: 1/9/2025 2:36 PM EST Indication: Dyspnea, chest pain. Comparison: None available. Technique: Axial CT images were obtained of the chest, abdomen and pelvis after the uneventful intravenous administration of 80 mL Isovue-370.  Reconstructed coronal and sagittal images were also obtained. Automated exposure control and iterative construction methods were used. Findings: CT chest: There is no pathologic axillary adenopathy or other worrisome body wall soft tissue finding in the chest. There is no pleural or pericardial effusion. There is no distinct pathologic mediastinal or hilar lymphadenopathy. Nonaneurysmal thoracic aorta. The central pulmonary arteries are patent. The lung fields demonstrate some mild emphysema and lower lung volume loss. There is no evidence of acute infectious process or distinct suspicious focal pulmonary nodularity. The osseous structures demonstrate no evidence of acute fracture or aggressive osseous lesion. CT abdomen pelvis: The body wall  soft tissues demonstrate no acute or suspicious findings. The osseous structures demonstrate no evidence of acute fracture or aggressive osseous lesion, with multilevel spondylosis present. The liver demonstrates homogeneous enhancement. There is mild intrahepatic and extrahepatic biliary ductal prominence without obstructing stone or mass visualized. The spleen is moderately enlarged measuring 16 cm craniocaudal. The pancreas demonstrates homogeneous enhancement  without acute inflammatory change. The right kidney demonstrates no acute or suspicious findings with some atrophy and simple appearing cysts noted. Small and large bowel loops are nondilated. There is no suspicious focal bowel wall thickening. There is  no free fluid or pneumoperitoneum. Within the pelvis there is a large multinodular mass present, with areas of central fluid density either cystic or reflecting necrosis measuring approximately 10 x 7 x 9 cm, concerning for either bladder mass or primary prostate neoplasm. There is no evidence of ureteral obstruction on the right. There is no pathologic retroperitoneal adenopathy. Mildly atherosclerotic, nonaneurysmal abdominal aorta.     Impression: Impression: Nonspecific intra and extrapelvic biliary ductal prominence without obstructing stone or mass present. Correlate with any clinical concern for biliary obstruction. 10 x 7 x 9 cm multinodular centrally cystic or necrotic pelvic mass either arising from the bladder or prostate concerning for possible neoplasm. Consider urology consultation. No acute findings in the chest. Electronically Signed: Tio Vogt MD  1/9/2025 3:22 PM EST  Workstation ID: [id number]    CT Chest With Contrast Diagnostic    Result Date: 1/9/2025  CT CHEST W CONTRAST DIAGNOSTIC, CT ABDOMEN PELVIS W CONTRAST Date of Exam: 1/9/2025 2:36 PM EST Indication: Dyspnea, chest pain. Comparison: None available. Technique: Axial CT images were obtained of the chest, abdomen and pelvis  after the uneventful intravenous administration of 80 mL Isovue-370.  Reconstructed coronal and sagittal images were also obtained. Automated exposure control and iterative construction methods were used. Findings: CT chest: There is no pathologic axillary adenopathy or other worrisome body wall soft tissue finding in the chest. There is no pleural or pericardial effusion. There is no distinct pathologic mediastinal or hilar lymphadenopathy. Nonaneurysmal thoracic aorta. The central pulmonary arteries are patent. The lung fields demonstrate some mild emphysema and lower lung volume loss. There is no evidence of acute infectious process or distinct suspicious focal pulmonary nodularity. The osseous structures demonstrate no evidence of acute fracture or aggressive osseous lesion. CT abdomen pelvis: The body wall soft tissues demonstrate no acute or suspicious findings. The osseous structures demonstrate no evidence of acute fracture or aggressive osseous lesion, with multilevel spondylosis present. The liver demonstrates homogeneous enhancement. There is mild intrahepatic and extrahepatic biliary ductal prominence without obstructing stone or mass visualized. The spleen is moderately enlarged measuring 16 cm craniocaudal. The pancreas demonstrates homogeneous enhancement  without acute inflammatory change. The right kidney demonstrates no acute or suspicious findings with some atrophy and simple appearing cysts noted. Small and large bowel loops are nondilated. There is no suspicious focal bowel wall thickening. There is  no free fluid or pneumoperitoneum. Within the pelvis there is a large multinodular mass present, with areas of central fluid density either cystic or reflecting necrosis measuring approximately 10 x 7 x 9 cm, concerning for either bladder mass or primary prostate neoplasm. There is no evidence of ureteral obstruction on the right. There is no pathologic retroperitoneal adenopathy. Mildly  atherosclerotic, nonaneurysmal abdominal aorta.     Impression: Impression: Nonspecific intra and extrapelvic biliary ductal prominence without obstructing stone or mass present. Correlate with any clinical concern for biliary obstruction. 10 x 7 x 9 cm multinodular centrally cystic or necrotic pelvic mass either arising from the bladder or prostate concerning for possible neoplasm. Consider urology consultation. No acute findings in the chest. Electronically Signed: Tio Vogt MD  1/9/2025 3:22 PM EST  Workstation ID: [id number]         Current medications:  Scheduled Meds:acetaminophen, 1,000 mg, Oral, TID  allopurinol, 100 mg, Oral, Daily  [Held by provider] amLODIPine, 10 mg, Oral, Daily  [Held by provider] carvedilol, 6.25 mg, Oral, BID  cefTRIAXone, 2,000 mg, Intravenous, Q24H  pravastatin, 40 mg, Oral, Nightly  senna-docusate sodium, 2 tablet, Oral, BID  sodium chloride, 10 mL, Intravenous, Q12H      Continuous Infusions:   PRN Meds:.  aluminum-magnesium hydroxide-simethicone    senna-docusate sodium **AND** polyethylene glycol **AND** bisacodyl **AND** bisacodyl    hydrocortisone    HYDROmorphone **AND** naloxone    ondansetron    oxyCODONE    Sodium Chloride (PF)    sodium chloride    sodium chloride    Assessment & Plan   Assessment & Plan     Active Hospital Problems    Diagnosis  POA    **Bladder mass [N32.89]  Yes    Severe protein-calorie malnutrition [E43]  Yes    Leukocytosis [D72.829]  Unknown    Anemia [D64.9]  Unknown    CKD (chronic kidney disease) stage 4, GFR 15-29 ml/min [N18.4]  Yes    History of colon cancer [Z85.038]  Yes    History of kidney cancer [Z85.528]  Not Applicable    HTN (hypertension) [I10]  Yes      Resolved Hospital Problems   No resolved problems to display.        Brief Hospital Course to date:  Nolberto Jackson Jr. is a 69 y.o. 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   Here 12/15-12/17 and attributed to partial SBO but persisted thereafter  CT abdomen/pelvis w contrast revealed mass arising from posterior bladder (on review of scans in November/December with radiology, present then as well though not as clear without contrast and likely attributed to prostatomegaly). This appears to be etiology of lower abdomen pain    Lower abdominal mass, likely bladder  H/o transitional cell carcinoma of bladder, ureters  -s/p CT biopsy today  -f/u [facility] in clinic for further planning  -not requiring significant pain medications, relieved w diagnosis    CKDIV s/p IV contrast  -s/p contrast 1/9, s/p IV hydration x 12 hours post contrast  -repeat BMP in AM    Leukocytosis - patient has dysuria though u/a not overly exciting given bladder mass would trx. Rocephin    Expected Discharge Location and Transportation: home  Expected Discharge 1/11 (Discharge date is tentative pending patient's medical condition and is subject to change)  Expected Discharge Date: 1/11/2025; Expected Discharge Time:      VTE Prophylaxis:  Mechanical VTE prophylaxis orders are present.         AM-PAC 6 Clicks Score (PT): 22 (01/10/25 1324)    CODE STATUS:   Code Status and Medical Interventions: CPR (Attempt to Resuscitate); Full Support   Ordered at: 01/09/25 0219     Level Of Support Discussed With:    Patient     Code Status (Patient has no pulse and is not breathing):    CPR (Attempt to Resuscitate)     Medical Interventions (Patient has pulse or is breathing):    Full Support       Christa Danielson MD  01/10/25

## 2025-01-11 NOTE — PLAN OF CARE
Goal Outcome Evaluation:  Plan of Care Reviewed With: patient        Progress: improving  Outcome Evaluation: VSS. MARICARMEN. NSR. A&O x4. PRNs administered for complaints of abd pain. No complaints of nausea. Pt resting at this time with no further complaints. Call light within reach. Plan for d/c in the AM.

## 2025-01-12 ENCOUNTER — READMISSION MANAGEMENT (OUTPATIENT)
Dept: CALL CENTER | Facility: HOSPITAL | Age: 70
End: 2025-01-12
Payer: MEDICARE

## 2025-01-12 NOTE — OUTREACH NOTE
Prep Survey      Flowsheet Row Responses   [facility] patient discharged from? [place]   Is LACE score < 7 ? No   Eligibility Readm Mgmt   Discharge diagnosis Bladder mass   Does the patient have one of the following disease processes/diagnoses(primary or secondary)? Other   Prep survey completed? Yes            Anjali CLAUDIO - Registered Nurse

## 2025-01-14 LAB
BACTERIA SPEC AEROBE CULT: NORMAL
BACTERIA SPEC AEROBE CULT: NORMAL

## 2025-01-15 LAB
CYTO UR: NORMAL
LAB AP CASE REPORT: NORMAL
LAB AP CLINICAL INFORMATION: NORMAL
LAB AP DIAGNOSIS COMMENT: NORMAL
PATH REPORT.FINAL DX SPEC: NORMAL
PATH REPORT.GROSS SPEC: NORMAL

## 2025-01-16 ENCOUNTER — READMISSION MANAGEMENT (OUTPATIENT)
Dept: CALL CENTER | Facility: HOSPITAL | Age: 70
End: 2025-01-16
Payer: MEDICARE

## 2025-01-16 NOTE — OUTREACH NOTE
Medical Week 1 Survey      Flowsheet Row Responses   [facility] patient discharged from? [place]   Does the patient have one of the following disease processes/diagnoses(primary or secondary)? Other   Week 1 attempt successful? No  [Reached brotherSukhdev-states will alert patient to future f/u calls. Defers to patient for updates.]   Unsuccessful attempts Attempt 1            Katalina CLAUDIO - Registered Nurse

## 2025-01-17 LAB
QT INTERVAL: 326 MS
QTC INTERVAL: 407 MS

## 2025-01-22 ENCOUNTER — READMISSION MANAGEMENT (OUTPATIENT)
Dept: CALL CENTER | Facility: HOSPITAL | Age: 70
End: 2025-01-22
Payer: MEDICARE

## 2025-01-22 NOTE — OUTREACH NOTE
"Medical Week 1 Survey      Flowsheet Row Responses   [facility] [place] patient discharged from? [place]   Does the patient have one of the following disease processes/diagnoses(primary or secondary)? Other   Week 1 attempt successful? Yes   Call start time 0832   Call end time 0835   Discharge diagnosis Bladder mass   Meds reviewed with patient/caregiver? Yes   Is the patient taking all medications as directed (includes completed medication regime)? Yes   Medication comments completed antibiotics   Does the patient have a primary care provider?  Yes   Does the patient have an appointment with their PCP within 7 days of discharge? No   What is preventing the patient from scheduling follow up appointments within 7 days of discharge? Haven't had time   Has the patient kept scheduled appointments due by today? N/A   Comments Pt has appt with Dr. Olivia on Friday 1/24   Psychosocial issues? No   Did the patient receive a copy of their discharge instructions? Yes   Nursing interventions Reviewed instructions with patient   What is the patient's perception of their health status since discharge? Improving   Is the patient/caregiver able to teach back signs and symptoms related to disease process for when to call PCP? Yes   Is the patient/caregiver able to teach back signs and symptoms related to disease process for when to call 911? Yes   Is the patient/caregiver able to teach back the hierarchy of who to call/visit for symptoms/problems? PCP, Specialist, Home health nurse, Urgent Care, ED, 911 Yes   If the patient is a current smoker, are they able to teach back resources for cessation? Not a smoker   Additional teach back comments Has not been able to go to PCP states \"everything is shut down\".  He has f/u appt on Friday with Dr. Olivia and will have blood work done there.  Denies any fever or chills   Week 1 call completed? Yes   Wrap up additional comments Pt to f/u with Dr. Olivia on Friday   Call end time 0835 "            Karolina WILLAMS - Licensed Nurse

## 2025-01-28 LAB
CYTO UR: NORMAL
LAB AP CASE REPORT: NORMAL
LAB AP CLINICAL INFORMATION: NORMAL
LAB AP DIAGNOSIS COMMENT: NORMAL
LAB AP OUTSIDE REPORT, ADDENDUM: NORMAL
PATH REPORT.FINAL DX SPEC: NORMAL
PATH REPORT.GROSS SPEC: NORMAL

## 2025-03-06 ENCOUNTER — TRANSCRIBE ORDERS (OUTPATIENT)
Dept: ADMINISTRATIVE | Facility: HOSPITAL | Age: 70
End: 2025-03-06
Payer: MEDICARE

## 2025-03-06 DIAGNOSIS — N18.4 CHRONIC KIDNEY DISEASE, STAGE IV (SEVERE): Primary | ICD-10-CM

## 2025-03-18 LAB
CYTO UR: NORMAL
LAB AP CASE REPORT: NORMAL
LAB AP CLINICAL INFORMATION: NORMAL
LAB AP DIAGNOSIS COMMENT: NORMAL
LAB AP OUTSIDE REPORT, ADDENDUM: NORMAL
PATH REPORT.ADDENDUM SPEC: NORMAL
PATH REPORT.FINAL DX SPEC: NORMAL
PATH REPORT.GROSS SPEC: NORMAL

## 2025-04-21 LAB
CYTO UR: NORMAL
LAB AP CASE REPORT: NORMAL
LAB AP CLINICAL INFORMATION: NORMAL
LAB AP DIAGNOSIS COMMENT: NORMAL
LAB AP OUTSIDE REPORT, ADDENDUM 2: NORMAL
LAB AP OUTSIDE REPORT, ADDENDUM: NORMAL
PATH REPORT.ADDENDUM SPEC: NORMAL
PATH REPORT.FINAL DX SPEC: NORMAL
PATH REPORT.GROSS SPEC: NORMAL

## (undated) DEVICE — SYS HARVST BONE/GRFT OSTEOAUGER 8MM

## (undated) DEVICE — GLV SURG SENSICARE PI MIC PF SZ7.5 LF STRL

## (undated) DEVICE — 450 ML BOTTLE OF 0.05% CHLORHEXIDINE GLUCONATE IN 99.95% STERILE WATER FOR IRRIGATION, USP AND APPLICATOR.: Brand: IRRISEPT ANTIMICROBIAL WOUND LAVAGE

## (undated) DEVICE — SUT VIC 2/0 CT2 27IN J269H

## (undated) DEVICE — SLNG ULTRSLING3 13TO15IN LG

## (undated) DEVICE — UNDERGLV SURG BIOGEL INDICAT PI SZ8 BLU

## (undated) DEVICE — SZR CAGESCRW ECLIPSE GRAD 1P/U STER

## (undated) DEVICE — TBG PENCL TELESCP MEGADYNE SMOKE EVAC 10FT

## (undated) DEVICE — GLV SURG PREMIERPRO MIC LTX PF SZ6.5 BRN

## (undated) DEVICE — DRP SURG U/DRP INVISISHIELD PA 48X52IN

## (undated) DEVICE — 3 BONE CEMENT MIXER: Brand: MIXEVAC

## (undated) DEVICE — 3M™ IOBAN™ 2 ANTIMICROBIAL INCISE DRAPE 6651EZ: Brand: IOBAN™ 2

## (undated) DEVICE — BLANKT WARM LOWR/BDY 100X120CM

## (undated) DEVICE — ANTIBACTERIAL UNDYED BRAIDED (POLYGLACTIN 910), SYNTHETIC ABSORBABLE SUTURE: Brand: COATED VICRYL

## (undated) DEVICE — ST PIN FIX TEMP UNIVERSREVERS W/BRKAWAY/GUIDE/PIN 2.4MM STRL

## (undated) DEVICE — PULLOVER TOGA, 2X LARGE: Brand: FLYTE, SURGICOOL

## (undated) DEVICE — INTENDED FOR TISSUE SEPARATION, AND OTHER PROCEDURES THAT REQUIRE A SHARP SURGICAL BLADE TO PUNCTURE OR CUT.: Brand: BARD-PARKER ® CARBON RIB-BACK BLADES

## (undated) DEVICE — UNDERGLV SURG BIOGEL INDICAT PF 61/2 GRN

## (undated) DEVICE — OSCILLATING TIP SAW CARTRIDGE: Brand: PRECISION FALCON

## (undated) DEVICE — PATIENT RETURN ELECTRODE, SINGLE-USE, CONTACT QUALITY MONITORING, ADULT, WITH 9FT CORD, FOR PATIENTS WEIGING OVER 33LBS. (15KG): Brand: MEGADYNE

## (undated) DEVICE — KT PUMP INFUBLOCK MDL 2100 PMKITSOLIS

## (undated) DEVICE — GLV SURG SENSICARE PI ORTHO SZ7.5 LF STRL

## (undated) DEVICE — PK MAJ SHLDR SPLT 10

## (undated) DEVICE — SYR SLP TP 10ML DISP

## (undated) DEVICE — INTENDED TO SUPPORT AND MAINTAIN THE POSITION OF AN ANESTHETIZED PATIENT DURING SURGERY: Brand: ERIN BEACH CHAIR FACE MASK

## (undated) DEVICE — SKN PREP SPNG STKS PVP PNT STR: Brand: MEDLINE INDUSTRIES, INC.

## (undated) DEVICE — NDL REV W/NITNL LP C13 .5CIR 36.6MM

## (undated) DEVICE — PROXIMATE RH ROTATING HEAD SKIN STAPLERS (35 WIDE) CONTAINS 35 STAINLESS STEEL STAPLES: Brand: PROXIMATE

## (undated) DEVICE — PENCL SMOKE/EVAC MEGADYNE TELESCP 10FT

## (undated) DEVICE — SPNG GZ WOVN 4X4IN 12PLY 10/BX STRL

## (undated) DEVICE — DRAPE,REIN 53X77,STERILE: Brand: MEDLINE

## (undated) DEVICE — DRAPE,TOWEL,LARGE,INVISISHIELD: Brand: MEDLINE